# Patient Record
Sex: MALE | Race: WHITE | NOT HISPANIC OR LATINO | Employment: OTHER | URBAN - NONMETROPOLITAN AREA
[De-identification: names, ages, dates, MRNs, and addresses within clinical notes are randomized per-mention and may not be internally consistent; named-entity substitution may affect disease eponyms.]

---

## 2017-03-30 ENCOUNTER — TRANSCRIBE ORDERS (OUTPATIENT)
Dept: PHYSICAL THERAPY | Facility: HOSPITAL | Age: 47
End: 2017-03-30

## 2017-03-30 DIAGNOSIS — M51.36 DEGENERATION OF LUMBAR INTERVERTEBRAL DISC: ICD-10-CM

## 2017-03-30 DIAGNOSIS — M54.10 RADICULAR SYNDROME OF LOWER LIMBS: Primary | ICD-10-CM

## 2017-04-20 ENCOUNTER — APPOINTMENT (OUTPATIENT)
Dept: PHYSICAL THERAPY | Facility: HOSPITAL | Age: 47
End: 2017-04-20

## 2017-05-25 ENCOUNTER — APPOINTMENT (OUTPATIENT)
Dept: LAB | Facility: HOSPITAL | Age: 47
End: 2017-05-25

## 2017-05-25 ENCOUNTER — OFFICE VISIT (OUTPATIENT)
Dept: CARDIAC SURGERY | Facility: CLINIC | Age: 47
End: 2017-05-25

## 2017-05-25 VITALS
SYSTOLIC BLOOD PRESSURE: 160 MMHG | TEMPERATURE: 97.3 F | BODY MASS INDEX: 32.7 KG/M2 | DIASTOLIC BLOOD PRESSURE: 70 MMHG | HEIGHT: 75 IN | OXYGEN SATURATION: 99 % | WEIGHT: 263 LBS | HEART RATE: 88 BPM

## 2017-05-25 DIAGNOSIS — R06.02 SHORTNESS OF BREATH: ICD-10-CM

## 2017-05-25 DIAGNOSIS — R93.89 ABNORMAL CHEST X-RAY: ICD-10-CM

## 2017-05-25 DIAGNOSIS — J18.9 PNEUMONIA DUE TO INFECTIOUS ORGANISM, UNSPECIFIED LATERALITY, UNSPECIFIED PART OF LUNG: Primary | ICD-10-CM

## 2017-05-25 DIAGNOSIS — R60.0 LOCALIZED EDEMA: Primary | ICD-10-CM

## 2017-05-25 LAB
ALBUMIN SERPL-MCNC: 4.3 G/DL (ref 3.4–4.8)
ALBUMIN/GLOB SERPL: 1.2 G/DL (ref 1.1–1.8)
ALP SERPL-CCNC: 85 U/L (ref 38–126)
ALT SERPL W P-5'-P-CCNC: 53 U/L (ref 21–72)
ANION GAP SERPL CALCULATED.3IONS-SCNC: 11 MMOL/L (ref 5–15)
AST SERPL-CCNC: 30 U/L (ref 17–59)
BASOPHILS # BLD AUTO: 0.02 10*3/MM3 (ref 0–0.2)
BASOPHILS NFR BLD AUTO: 0.4 % (ref 0–2)
BILIRUB SERPL-MCNC: 0.4 MG/DL (ref 0.2–1.3)
BUN BLD-MCNC: 15 MG/DL (ref 7–21)
BUN/CREAT SERPL: 18.3 (ref 7–25)
CALCIUM SPEC-SCNC: 9.1 MG/DL (ref 8.4–10.2)
CHLORIDE SERPL-SCNC: 101 MMOL/L (ref 95–110)
CO2 SERPL-SCNC: 28 MMOL/L (ref 22–31)
CREAT BLD-MCNC: 0.82 MG/DL (ref 0.7–1.3)
CRP SERPL-MCNC: 0.6 MG/DL (ref 0–1)
DEPRECATED RDW RBC AUTO: 42.6 FL (ref 35.1–43.9)
EOSINOPHIL # BLD AUTO: 0.2 10*3/MM3 (ref 0–0.7)
EOSINOPHIL NFR BLD AUTO: 4.4 % (ref 0–7)
ERYTHROCYTE [DISTWIDTH] IN BLOOD BY AUTOMATED COUNT: 13.3 % (ref 11.5–14.5)
ERYTHROCYTE [SEDIMENTATION RATE] IN BLOOD: 3 MM/HR (ref 0–15)
GFR SERPL CREATININE-BSD FRML MDRD: 102 ML/MIN/1.73 (ref 63–147)
GLOBULIN UR ELPH-MCNC: 3.6 GM/DL (ref 2.3–3.5)
GLUCOSE BLD-MCNC: 92 MG/DL (ref 60–100)
HCT VFR BLD AUTO: 44.5 % (ref 39–49)
HGB BLD-MCNC: 15.1 G/DL (ref 13.7–17.3)
IMM GRANULOCYTES # BLD: 0.01 10*3/MM3 (ref 0–0.02)
IMM GRANULOCYTES NFR BLD: 0.2 % (ref 0–0.5)
LYMPHOCYTES # BLD AUTO: 1.25 10*3/MM3 (ref 0.6–4.2)
LYMPHOCYTES NFR BLD AUTO: 27.5 % (ref 10–50)
MCH RBC QN AUTO: 29.5 PG (ref 26.5–34)
MCHC RBC AUTO-ENTMCNC: 33.9 G/DL (ref 31.5–36.3)
MCV RBC AUTO: 86.9 FL (ref 80–98)
MONOCYTES # BLD AUTO: 0.49 10*3/MM3 (ref 0–0.9)
MONOCYTES NFR BLD AUTO: 10.8 % (ref 0–12)
NEUTROPHILS # BLD AUTO: 2.58 10*3/MM3 (ref 2–8.6)
NEUTROPHILS NFR BLD AUTO: 56.7 % (ref 37–80)
PLATELET # BLD AUTO: 191 10*3/MM3 (ref 150–450)
PMV BLD AUTO: 11 FL (ref 8–12)
POTASSIUM BLD-SCNC: 4 MMOL/L (ref 3.5–5.1)
PROT SERPL-MCNC: 7.9 G/DL (ref 6.3–8.6)
RBC # BLD AUTO: 5.12 10*6/MM3 (ref 4.37–5.74)
SODIUM BLD-SCNC: 140 MMOL/L (ref 137–145)
WBC NRBC COR # BLD: 4.55 10*3/MM3 (ref 3.2–9.8)

## 2017-05-25 PROCEDURE — 85613 RUSSELL VIPER VENOM DILUTED: CPT | Performed by: THORACIC SURGERY (CARDIOTHORACIC VASCULAR SURGERY)

## 2017-05-25 PROCEDURE — 85732 THROMBOPLASTIN TIME PARTIAL: CPT | Performed by: THORACIC SURGERY (CARDIOTHORACIC VASCULAR SURGERY)

## 2017-05-25 PROCEDURE — 85025 COMPLETE CBC W/AUTO DIFF WBC: CPT | Performed by: THORACIC SURGERY (CARDIOTHORACIC VASCULAR SURGERY)

## 2017-05-25 PROCEDURE — 85670 THROMBIN TIME PLASMA: CPT | Performed by: THORACIC SURGERY (CARDIOTHORACIC VASCULAR SURGERY)

## 2017-05-25 PROCEDURE — 86140 C-REACTIVE PROTEIN: CPT | Performed by: THORACIC SURGERY (CARDIOTHORACIC VASCULAR SURGERY)

## 2017-05-25 PROCEDURE — 85651 RBC SED RATE NONAUTOMATED: CPT | Performed by: THORACIC SURGERY (CARDIOTHORACIC VASCULAR SURGERY)

## 2017-05-25 PROCEDURE — 80053 COMPREHEN METABOLIC PANEL: CPT | Performed by: THORACIC SURGERY (CARDIOTHORACIC VASCULAR SURGERY)

## 2017-05-25 PROCEDURE — 99202 OFFICE O/P NEW SF 15 MIN: CPT | Performed by: THORACIC SURGERY (CARDIOTHORACIC VASCULAR SURGERY)

## 2017-05-25 PROCEDURE — 36415 COLL VENOUS BLD VENIPUNCTURE: CPT | Performed by: THORACIC SURGERY (CARDIOTHORACIC VASCULAR SURGERY)

## 2017-05-25 PROCEDURE — 85705 THROMBOPLASTIN INHIBITION: CPT | Performed by: THORACIC SURGERY (CARDIOTHORACIC VASCULAR SURGERY)

## 2017-05-25 RX ORDER — BUMETANIDE 1 MG/1
TABLET ORAL
COMMUNITY
Start: 2017-05-09 | End: 2017-09-13 | Stop reason: DRUGHIGH

## 2017-05-25 RX ORDER — ASPIRIN 81 MG
TABLET, DELAYED RELEASE (ENTERIC COATED) ORAL
COMMUNITY
Start: 2017-05-16 | End: 2021-05-01

## 2017-05-25 RX ORDER — RANITIDINE 150 MG/1
TABLET ORAL
COMMUNITY
Start: 2017-04-17 | End: 2017-09-13 | Stop reason: DRUGHIGH

## 2017-05-25 RX ORDER — ATORVASTATIN CALCIUM 10 MG/1
20 TABLET, FILM COATED ORAL NIGHTLY
COMMUNITY
Start: 2017-05-16 | End: 2021-05-01

## 2017-05-25 RX ORDER — OMEPRAZOLE 40 MG/1
CAPSULE, DELAYED RELEASE ORAL
COMMUNITY
Start: 2017-02-28 | End: 2017-09-13 | Stop reason: DRUGHIGH

## 2017-05-25 RX ORDER — ALBUTEROL SULFATE 90 UG/1
AEROSOL, METERED RESPIRATORY (INHALATION)
COMMUNITY
Start: 2017-03-07 | End: 2021-05-01

## 2017-05-25 RX ORDER — LEVOTHYROXINE SODIUM 0.1 MG/1
TABLET ORAL
COMMUNITY
Start: 2017-05-04 | End: 2021-05-01

## 2017-05-27 LAB
LA NT DPL PPP: 42.6 SEC (ref 0–55)
LA NT DPL/LA NT HPL PPP-RTO: 1.13 RATIO (ref 0–1.4)
LA NT PLATELET PPP: 34.5 SEC (ref 0–43.6)
LUPUS ANTICOAGULANT REFLEX: NORMAL
SCREEN DRVVT: 42.3 SEC (ref 0–47)
THROMBIN TIME: 16.8 SEC (ref 0–20.9)

## 2017-05-28 LAB
BH CV LOWER VASCULAR LEFT COMMON FEMORAL AUGMENT: NORMAL
BH CV LOWER VASCULAR LEFT COMMON FEMORAL COMPETENT: NORMAL
BH CV LOWER VASCULAR LEFT COMMON FEMORAL COMPRESS: NORMAL
BH CV LOWER VASCULAR LEFT COMMON FEMORAL PHASIC: NORMAL
BH CV LOWER VASCULAR LEFT COMMON FEMORAL SPONT: NORMAL
BH CV LOWER VASCULAR LEFT DISTAL FEMORAL AUGMENT: NORMAL
BH CV LOWER VASCULAR LEFT DISTAL FEMORAL COMPETENT: NORMAL
BH CV LOWER VASCULAR LEFT DISTAL FEMORAL COMPRESS: NORMAL
BH CV LOWER VASCULAR LEFT DISTAL FEMORAL PHASIC: NORMAL
BH CV LOWER VASCULAR LEFT DISTAL FEMORAL SPONT: NORMAL
BH CV LOWER VASCULAR LEFT GREATER SAPH AK AUGMENT: NORMAL
BH CV LOWER VASCULAR LEFT GREATER SAPH AK COMPETENT: NORMAL
BH CV LOWER VASCULAR LEFT GREATER SAPH AK COMPRESS: NORMAL
BH CV LOWER VASCULAR LEFT GREATER SAPH AK PHASIC: NORMAL
BH CV LOWER VASCULAR LEFT GREATER SAPH AK SPONT: NORMAL
BH CV LOWER VASCULAR LEFT LESSER SAPH AUGMENT: NORMAL
BH CV LOWER VASCULAR LEFT LESSER SAPH COMPETENT: NORMAL
BH CV LOWER VASCULAR LEFT LESSER SAPH COMPRESS: NORMAL
BH CV LOWER VASCULAR LEFT MID FEMORAL AUGMENT: NORMAL
BH CV LOWER VASCULAR LEFT MID FEMORAL COMPETENT: NORMAL
BH CV LOWER VASCULAR LEFT MID FEMORAL COMPRESS: NORMAL
BH CV LOWER VASCULAR LEFT MID FEMORAL PHASIC: NORMAL
BH CV LOWER VASCULAR LEFT MID FEMORAL SPONT: NORMAL
BH CV LOWER VASCULAR LEFT PERONEAL AUGMENT: NORMAL
BH CV LOWER VASCULAR LEFT PERONEAL COMPETENT: NORMAL
BH CV LOWER VASCULAR LEFT PERONEAL COMPRESS: NORMAL
BH CV LOWER VASCULAR LEFT PERONEAL PHASIC: NORMAL
BH CV LOWER VASCULAR LEFT PERONEAL SPONT: NORMAL
BH CV LOWER VASCULAR LEFT POPLITEAL AUGMENT: NORMAL
BH CV LOWER VASCULAR LEFT POPLITEAL COMPETENT: NORMAL
BH CV LOWER VASCULAR LEFT POPLITEAL COMPRESS: NORMAL
BH CV LOWER VASCULAR LEFT POPLITEAL PHASIC: NORMAL
BH CV LOWER VASCULAR LEFT POPLITEAL SPONT: NORMAL
BH CV LOWER VASCULAR LEFT POSTERIOR TIBIAL AUGMENT: NORMAL
BH CV LOWER VASCULAR LEFT POSTERIOR TIBIAL COMPETENT: NORMAL
BH CV LOWER VASCULAR LEFT POSTERIOR TIBIAL COMPRESS: NORMAL
BH CV LOWER VASCULAR LEFT POSTERIOR TIBIAL PHASIC: NORMAL
BH CV LOWER VASCULAR LEFT POSTERIOR TIBIAL SPONT: NORMAL
BH CV LOWER VASCULAR LEFT PROFUNDA FEMORAL AUGMENT: NORMAL
BH CV LOWER VASCULAR LEFT PROFUNDA FEMORAL COMPETENT: NORMAL
BH CV LOWER VASCULAR LEFT PROFUNDA FEMORAL COMPRESS: NORMAL
BH CV LOWER VASCULAR LEFT PROFUNDA FEMORAL PHASIC: NORMAL
BH CV LOWER VASCULAR LEFT PROFUNDA FEMORAL SPONT: NORMAL
BH CV LOWER VASCULAR LEFT PROXIMAL FEMORAL AUGMENT: NORMAL
BH CV LOWER VASCULAR LEFT PROXIMAL FEMORAL COMPETENT: NORMAL
BH CV LOWER VASCULAR LEFT PROXIMAL FEMORAL COMPRESS: NORMAL
BH CV LOWER VASCULAR LEFT PROXIMAL FEMORAL PHASIC: NORMAL
BH CV LOWER VASCULAR LEFT PROXIMAL FEMORAL SPONT: NORMAL
BH CV LOWER VASCULAR LEFT SAPHENOFEMORAL JUNCTION AUGMENT: NORMAL
BH CV LOWER VASCULAR LEFT SAPHENOFEMORAL JUNCTION COMPETENT: NORMAL
BH CV LOWER VASCULAR LEFT SAPHENOFEMORAL JUNCTION COMPRESS: NORMAL
BH CV LOWER VASCULAR LEFT SAPHENOFEMORAL JUNCTION PHASIC: NORMAL
BH CV LOWER VASCULAR LEFT SAPHENOFEMORAL JUNCTION SPONT: NORMAL
BH CV LOWER VASCULAR RIGHT COMMON FEMORAL AUGMENT: NORMAL
BH CV LOWER VASCULAR RIGHT COMMON FEMORAL COMPETENT: NORMAL
BH CV LOWER VASCULAR RIGHT COMMON FEMORAL COMPRESS: NORMAL
BH CV LOWER VASCULAR RIGHT COMMON FEMORAL PHASIC: NORMAL
BH CV LOWER VASCULAR RIGHT COMMON FEMORAL SPONT: NORMAL
BH CV LOWER VASCULAR RIGHT DISTAL FEMORAL AUGMENT: NORMAL
BH CV LOWER VASCULAR RIGHT DISTAL FEMORAL COMPETENT: NORMAL
BH CV LOWER VASCULAR RIGHT DISTAL FEMORAL COMPRESS: NORMAL
BH CV LOWER VASCULAR RIGHT DISTAL FEMORAL PHASIC: NORMAL
BH CV LOWER VASCULAR RIGHT DISTAL FEMORAL SPONT: NORMAL
BH CV LOWER VASCULAR RIGHT GASTRONEMIUS COMPETENT: NORMAL
BH CV LOWER VASCULAR RIGHT GASTRONEMIUS COMPRESS: NORMAL
BH CV LOWER VASCULAR RIGHT GREATER SAPH AK AUGMENT: NORMAL
BH CV LOWER VASCULAR RIGHT GREATER SAPH AK COMPETENT: NORMAL
BH CV LOWER VASCULAR RIGHT GREATER SAPH AK COMPRESS: NORMAL
BH CV LOWER VASCULAR RIGHT GREATER SAPH AK PHASIC: NORMAL
BH CV LOWER VASCULAR RIGHT GREATER SAPH AK SPONT: NORMAL
BH CV LOWER VASCULAR RIGHT GREATER SAPH BK AUGMENT: NORMAL
BH CV LOWER VASCULAR RIGHT GREATER SAPH BK COMPETENT: NORMAL
BH CV LOWER VASCULAR RIGHT GREATER SAPH BK COMPRESS: NORMAL
BH CV LOWER VASCULAR RIGHT GREATER SAPH BK PHASIC: NORMAL
BH CV LOWER VASCULAR RIGHT GREATER SAPH BK SPONT: NORMAL
BH CV LOWER VASCULAR RIGHT LESSER SAPH AUGMENT: NORMAL
BH CV LOWER VASCULAR RIGHT LESSER SAPH COMPETENT: NORMAL
BH CV LOWER VASCULAR RIGHT LESSER SAPH COMPRESS: NORMAL
BH CV LOWER VASCULAR RIGHT MID FEMORAL AUGMENT: NORMAL
BH CV LOWER VASCULAR RIGHT MID FEMORAL COMPETENT: NORMAL
BH CV LOWER VASCULAR RIGHT MID FEMORAL COMPRESS: NORMAL
BH CV LOWER VASCULAR RIGHT MID FEMORAL PHASIC: NORMAL
BH CV LOWER VASCULAR RIGHT MID FEMORAL SPONT: NORMAL
BH CV LOWER VASCULAR RIGHT PERONEAL AUGMENT: NORMAL
BH CV LOWER VASCULAR RIGHT PERONEAL COMPETENT: NORMAL
BH CV LOWER VASCULAR RIGHT PERONEAL COMPRESS: NORMAL
BH CV LOWER VASCULAR RIGHT PERONEAL PHASIC: NORMAL
BH CV LOWER VASCULAR RIGHT PERONEAL SPONT: NORMAL
BH CV LOWER VASCULAR RIGHT POPLITEAL AUGMENT: NORMAL
BH CV LOWER VASCULAR RIGHT POPLITEAL COMPETENT: NORMAL
BH CV LOWER VASCULAR RIGHT POPLITEAL COMPRESS: NORMAL
BH CV LOWER VASCULAR RIGHT POPLITEAL PHASIC: NORMAL
BH CV LOWER VASCULAR RIGHT POPLITEAL SPONT: NORMAL
BH CV LOWER VASCULAR RIGHT POSTERIOR TIBIAL AUGMENT: NORMAL
BH CV LOWER VASCULAR RIGHT POSTERIOR TIBIAL COMPETENT: NORMAL
BH CV LOWER VASCULAR RIGHT POSTERIOR TIBIAL COMPRESS: NORMAL
BH CV LOWER VASCULAR RIGHT POSTERIOR TIBIAL PHASIC: NORMAL
BH CV LOWER VASCULAR RIGHT POSTERIOR TIBIAL SPONT: NORMAL
BH CV LOWER VASCULAR RIGHT PROFUNDA FEMORAL AUGMENT: NORMAL
BH CV LOWER VASCULAR RIGHT PROFUNDA FEMORAL COMPETENT: NORMAL
BH CV LOWER VASCULAR RIGHT PROFUNDA FEMORAL COMPRESS: NORMAL
BH CV LOWER VASCULAR RIGHT PROFUNDA FEMORAL PHASIC: NORMAL
BH CV LOWER VASCULAR RIGHT PROFUNDA FEMORAL SPONT: NORMAL
BH CV LOWER VASCULAR RIGHT PROXIMAL FEMORAL AUGMENT: NORMAL
BH CV LOWER VASCULAR RIGHT PROXIMAL FEMORAL COMPETENT: NORMAL
BH CV LOWER VASCULAR RIGHT PROXIMAL FEMORAL COMPRESS: NORMAL
BH CV LOWER VASCULAR RIGHT PROXIMAL FEMORAL PHASIC: NORMAL
BH CV LOWER VASCULAR RIGHT PROXIMAL FEMORAL SPONT: NORMAL
BH CV LOWER VASCULAR RIGHT SAPHENOFEMORAL JUNCTION AUGMENT: NORMAL
BH CV LOWER VASCULAR RIGHT SAPHENOFEMORAL JUNCTION COMPETENT: NORMAL
BH CV LOWER VASCULAR RIGHT SAPHENOFEMORAL JUNCTION COMPRESS: NORMAL
BH CV LOWER VASCULAR RIGHT SAPHENOFEMORAL JUNCTION PHASIC: NORMAL
BH CV LOWER VASCULAR RIGHT SAPHENOFEMORAL JUNCTION SPONT: NORMAL

## 2017-06-05 DIAGNOSIS — M79.89 LEG SWELLING: Primary | ICD-10-CM

## 2017-06-06 ENCOUNTER — HOSPITAL ENCOUNTER (OUTPATIENT)
Dept: CT IMAGING | Facility: HOSPITAL | Age: 47
Discharge: HOME | End: 2017-06-06
Admitting: THORACIC SURGERY (CARDIOTHORACIC VASCULAR SURGERY)

## 2017-06-06 ENCOUNTER — APPOINTMENT (OUTPATIENT)
Dept: LAB | Facility: HOSPITAL | Age: 47
End: 2017-06-06

## 2017-06-06 ENCOUNTER — OFFICE VISIT (OUTPATIENT)
Dept: CARDIAC SURGERY | Facility: CLINIC | Age: 47
End: 2017-06-06

## 2017-06-06 VITALS
TEMPERATURE: 98.3 F | DIASTOLIC BLOOD PRESSURE: 75 MMHG | SYSTOLIC BLOOD PRESSURE: 130 MMHG | HEART RATE: 61 BPM | BODY MASS INDEX: 32.7 KG/M2 | HEIGHT: 75 IN | WEIGHT: 263 LBS | OXYGEN SATURATION: 98 %

## 2017-06-06 DIAGNOSIS — J18.9 PNEUMONIA DUE TO INFECTIOUS ORGANISM, UNSPECIFIED LATERALITY, UNSPECIFIED PART OF LUNG: ICD-10-CM

## 2017-06-06 DIAGNOSIS — R06.02 SHORTNESS OF BREATH: ICD-10-CM

## 2017-06-06 DIAGNOSIS — R91.8 HILAR MASS: Primary | ICD-10-CM

## 2017-06-06 DIAGNOSIS — R60.0 LOCALIZED EDEMA: ICD-10-CM

## 2017-06-06 DIAGNOSIS — R93.89 ABNORMAL CHEST X-RAY: ICD-10-CM

## 2017-06-06 LAB
CRP SERPL-MCNC: 1.1 MG/DL (ref 0–1)
ERYTHROCYTE [SEDIMENTATION RATE] IN BLOOD: 12 MM/HR (ref 0–15)

## 2017-06-06 PROCEDURE — 99212 OFFICE O/P EST SF 10 MIN: CPT | Performed by: THORACIC SURGERY (CARDIOTHORACIC VASCULAR SURGERY)

## 2017-06-06 PROCEDURE — 71260 CT THORAX DX C+: CPT

## 2017-06-06 PROCEDURE — 36415 COLL VENOUS BLD VENIPUNCTURE: CPT | Performed by: THORACIC SURGERY (CARDIOTHORACIC VASCULAR SURGERY)

## 2017-06-06 PROCEDURE — 0 IOPAMIDOL 61 % SOLUTION: Performed by: THORACIC SURGERY (CARDIOTHORACIC VASCULAR SURGERY)

## 2017-06-06 PROCEDURE — 85651 RBC SED RATE NONAUTOMATED: CPT | Performed by: THORACIC SURGERY (CARDIOTHORACIC VASCULAR SURGERY)

## 2017-06-06 PROCEDURE — 86140 C-REACTIVE PROTEIN: CPT | Performed by: THORACIC SURGERY (CARDIOTHORACIC VASCULAR SURGERY)

## 2017-06-06 RX ADMIN — IOPAMIDOL 94 ML: 612 INJECTION, SOLUTION INTRAVENOUS at 10:00

## 2017-06-07 NOTE — PROGRESS NOTES
"Jalil Camarillo  1970            46 y.o.      6/6/2017    Chief Complaint   Patient presents with   • 10 day f/u     labs OA/ CT chest     46-year-old man seen initially on 5/25/2017 with edema both lower extremities, worse left than right.  Please see my extensive dictation at that time 5/25/17 which includes details of the extensive evaluation he underwent an [facility].  His CRP has returned to nearly normal.  Edema persists both lower extremities, worse left than right.  Lupus panel negative      HPI        ROS       Current Outpatient Prescriptions:   •  ASPIRIN LOW DOSE 81 MG EC tablet, , Disp: , Rfl:   •  atorvastatin (LIPITOR) 10 MG tablet, , Disp: , Rfl:   •  bumetanide (BUMEX) 1 MG tablet, , Disp: , Rfl:   •  levothyroxine (SYNTHROID, LEVOTHROID) 100 MCG tablet, , Disp: , Rfl:   •  omeprazole (priLOSEC) 40 MG capsule, , Disp: , Rfl:   •  raNITIdine (ZANTAC) 150 MG tablet, , Disp: , Rfl:   •  VENTOLIN  (90 BASE) MCG/ACT inhaler, , Disp: , Rfl:   No current facility-administered medications for this visit.     The following portions of the patient's history were reviewed and updated as appropriate: allergies, current medications, past family history, past medical history, past social history, past surgical history and problem list.        Past Surgical History:   Procedure Laterality Date   • CARDIAC CATHETERIZATION  02/02/2017    [place] No  interventions   • HERNIA REPAIR Right 04/20/2017    [place],, Dr. Lemus, [facility]           Physical Exam  /75 (BP Location: Left arm, Patient Position: Sitting)  Pulse 61  Temp 98.3 °F (36.8 °C) (Oral)   Ht 75\" (190.5 cm)  Wt 263 lb (119 kg)  SpO2 98%  BMI 32.87 kg/m2    HEENT:    CHEST:    HEART:    ABDOMEN:    EXTREMITIES:Edema persists both lower extremities and with prolonged standing patient experiences discomfort in both ankles    VASCULAR LAB " STUDIES:            RADIOLOGY: CT scan I.V. Contrast (6-6-17)  1. Linear changes left lung base                                                                              2. Enlarged left hilar lymph node 2.94 cm peribronchial                                                                                         Left stem bronchus                                                                              3. Radiology suggested PET scan  Consideratons included pulmonary neoplasia, such as left lung carcinoma, or lymphoma      Hilar mass [R91.8]    IMPRESSION:  1. Abnormality CT scan chest 2.94 cm lymph node adjacent to left mainstem bronchus left hilar lymph node or mass                  Assessment/Plan  Jalil was seen today for 10 day f/u.    Diagnoses and all orders for this visit:    Hilar mass  -     Cancel: NM Pet Chest  -     NM Pet Skull Base To Mid Thigh                Plan   1. PET scan planned but temporarily delayed in c=scheduling because Biopsy required.  2. Upon return x-rays both ankles and bilateral tib/fib, uric acid  3. Local cardiology consult, Dr. Mohamud   4. Potential pulmonology consult for consideration bronchoscopy and trans bronchial biopsy of left hilar mass              Jack L. Hamman, MD  6/6/2017

## 2017-08-14 ENCOUNTER — TRANSCRIBE ORDERS (OUTPATIENT)
Dept: PHYSICAL THERAPY | Facility: HOSPITAL | Age: 47
End: 2017-08-14

## 2017-08-14 DIAGNOSIS — M51.36 DDD (DEGENERATIVE DISC DISEASE), LUMBAR: Primary | ICD-10-CM

## 2017-09-13 ENCOUNTER — HOSPITAL ENCOUNTER (OUTPATIENT)
Dept: GENERAL RADIOLOGY | Facility: HOSPITAL | Age: 47
Discharge: HOME | End: 2017-09-13
Attending: INTERNAL MEDICINE | Admitting: INTERNAL MEDICINE

## 2017-09-13 ENCOUNTER — APPOINTMENT (OUTPATIENT)
Dept: LAB | Facility: HOSPITAL | Age: 47
End: 2017-09-13

## 2017-09-13 ENCOUNTER — OFFICE VISIT (OUTPATIENT)
Dept: CARDIOLOGY | Facility: CLINIC | Age: 47
End: 2017-09-13

## 2017-09-13 VITALS
HEART RATE: 81 BPM | HEIGHT: 75 IN | WEIGHT: 281 LBS | OXYGEN SATURATION: 96 % | BODY MASS INDEX: 34.94 KG/M2 | DIASTOLIC BLOOD PRESSURE: 80 MMHG | SYSTOLIC BLOOD PRESSURE: 126 MMHG

## 2017-09-13 DIAGNOSIS — R06.09 DYSPNEA ON EXERTION: ICD-10-CM

## 2017-09-13 DIAGNOSIS — R07.2 PRECORDIAL PAIN: Primary | ICD-10-CM

## 2017-09-13 DIAGNOSIS — E78.2 MIXED HYPERLIPIDEMIA: ICD-10-CM

## 2017-09-13 DIAGNOSIS — R60.0 LOCALIZED EDEMA: ICD-10-CM

## 2017-09-13 DIAGNOSIS — R07.9 CHEST PAIN, UNSPECIFIED TYPE: Primary | ICD-10-CM

## 2017-09-13 DIAGNOSIS — R93.89 ABNORMAL CHEST X-RAY: ICD-10-CM

## 2017-09-13 LAB
ALBUMIN SERPL-MCNC: 4.3 G/DL (ref 3.4–4.8)
ALBUMIN UR-MCNC: <0.6 MG/L
ALBUMIN/GLOB SERPL: 1.3 G/DL (ref 1.1–1.8)
ALP SERPL-CCNC: 96 U/L (ref 38–126)
ALT SERPL W P-5'-P-CCNC: 79 U/L (ref 21–72)
ANION GAP SERPL CALCULATED.3IONS-SCNC: 9 MMOL/L (ref 5–15)
ARTICHOKE IGE QN: 75 MG/DL (ref 1–129)
AST SERPL-CCNC: 39 U/L (ref 17–59)
BILIRUB SERPL-MCNC: 0.6 MG/DL (ref 0.2–1.3)
BUN BLD-MCNC: 15 MG/DL (ref 7–21)
BUN/CREAT SERPL: 15 (ref 7–25)
CALCIUM SPEC-SCNC: 9.6 MG/DL (ref 8.4–10.2)
CHLORIDE SERPL-SCNC: 100 MMOL/L (ref 95–110)
CHOLEST SERPL-MCNC: 145 MG/DL (ref 0–199)
CO2 SERPL-SCNC: 30 MMOL/L (ref 22–31)
CREAT BLD-MCNC: 1 MG/DL (ref 0.7–1.3)
DEPRECATED RDW RBC AUTO: 43.2 FL (ref 35.1–43.9)
ERYTHROCYTE [DISTWIDTH] IN BLOOD BY AUTOMATED COUNT: 13.9 % (ref 11.5–14.5)
GFR SERPL CREATININE-BSD FRML MDRD: 80 ML/MIN/1.73 (ref 63–147)
GLOBULIN UR ELPH-MCNC: 3.3 GM/DL (ref 2.3–3.5)
GLUCOSE BLD-MCNC: 84 MG/DL (ref 60–100)
HCT VFR BLD AUTO: 43.6 % (ref 39–49)
HDLC SERPL-MCNC: 35 MG/DL (ref 60–200)
HGB BLD-MCNC: 14.5 G/DL (ref 13.7–17.3)
LDLC/HDLC SERPL: 1.79 {RATIO} (ref 0–3.55)
MCH RBC QN AUTO: 28.7 PG (ref 26.5–34)
MCHC RBC AUTO-ENTMCNC: 33.3 G/DL (ref 31.5–36.3)
MCV RBC AUTO: 86.3 FL (ref 80–98)
NT-PROBNP SERPL-MCNC: <11.1 PG/ML (ref 0–450)
PLATELET # BLD AUTO: 190 10*3/MM3 (ref 150–450)
PMV BLD AUTO: 10.5 FL (ref 8–12)
POTASSIUM BLD-SCNC: 4.1 MMOL/L (ref 3.5–5.1)
PROT SERPL-MCNC: 7.6 G/DL (ref 6.3–8.6)
RBC # BLD AUTO: 5.05 10*6/MM3 (ref 4.37–5.74)
SODIUM BLD-SCNC: 139 MMOL/L (ref 137–145)
TRIGL SERPL-MCNC: 237 MG/DL (ref 20–199)
TSH SERPL DL<=0.05 MIU/L-ACNC: 1.38 MIU/ML (ref 0.46–4.68)
WBC NRBC COR # BLD: 5.95 10*3/MM3 (ref 3.2–9.8)

## 2017-09-13 PROCEDURE — 82043 UR ALBUMIN QUANTITATIVE: CPT | Performed by: INTERNAL MEDICINE

## 2017-09-13 PROCEDURE — 85027 COMPLETE CBC AUTOMATED: CPT | Performed by: INTERNAL MEDICINE

## 2017-09-13 PROCEDURE — 71020 HC CHEST PA AND LATERAL: CPT

## 2017-09-13 PROCEDURE — 93000 ELECTROCARDIOGRAM COMPLETE: CPT | Performed by: INTERNAL MEDICINE

## 2017-09-13 PROCEDURE — 36415 COLL VENOUS BLD VENIPUNCTURE: CPT | Performed by: INTERNAL MEDICINE

## 2017-09-13 PROCEDURE — 83880 ASSAY OF NATRIURETIC PEPTIDE: CPT | Performed by: INTERNAL MEDICINE

## 2017-09-13 PROCEDURE — 84443 ASSAY THYROID STIM HORMONE: CPT | Performed by: INTERNAL MEDICINE

## 2017-09-13 PROCEDURE — 80053 COMPREHEN METABOLIC PANEL: CPT | Performed by: INTERNAL MEDICINE

## 2017-09-13 PROCEDURE — 99203 OFFICE O/P NEW LOW 30 MIN: CPT | Performed by: INTERNAL MEDICINE

## 2017-09-13 PROCEDURE — 80061 LIPID PANEL: CPT | Performed by: INTERNAL MEDICINE

## 2017-09-13 RX ORDER — PANTOPRAZOLE SODIUM 40 MG/1
TABLET, DELAYED RELEASE ORAL
COMMUNITY
Start: 2017-08-07 | End: 2021-05-01

## 2017-09-13 RX ORDER — IPRATROPIUM BROMIDE AND ALBUTEROL SULFATE 2.5; .5 MG/3ML; MG/3ML
3 SOLUTION RESPIRATORY (INHALATION) EVERY 4 HOURS PRN
COMMUNITY
End: 2021-05-01

## 2017-09-13 RX ORDER — BUMETANIDE 2 MG/1
TABLET ORAL DAILY
COMMUNITY
Start: 2017-08-16 | End: 2021-05-01

## 2017-09-13 NOTE — PROGRESS NOTES
"   Cardiovascular Medicine      Dick Mohamud M.D., Ph.D., [facility]         Thank you for asking me to see Jalil Camarillo for CP.    History of Present Illness  This is a 47 y.o. male with:  1. CPS  2. Dyspnea  3. BEATRIZ  A. Awaiting CPAP  4. HLD  5. Left hilar mass    CPS and Dyspnea  The patient's referred for complaints of chest pain and dyspnea.  Patient does have a history of hyperlipidemia.  He is a former smoker. He tells me he had a stress test earlier this year. His CP is a fluttering sensation. He also gets a burning sensation over his chest. He has been having dyspnea, LE edema and abdominal swelling. He complains of abdominal pain. He was found to have an abnormal chest CT. He has not had PFTs or seen pulmonary. He is now using Bumex 2 mg a day. He has no known issues with CHF or prior MI. He tells me he had a LHC earlier this year.  He does not know the results of these.  I do not actually have his records.  He is actually tells me that he seen numerous physicians since earlier this year.  He tells me that everyone he sees says that he is \"fine.  \"He has not seen pulmonary.  Of note, he did have a chest CT scan that showed a left hilar mass and mediastinal lymphadenopathy.  He was not referred to pulmonary.  He denies any history of lung diseases.  He does have exertional intolerance and continued dyspnea.  His Bumex was increased to 20 mg a day.  He states that his abdomen is no longer swollen but he remains with stable lower extremity edema.    Review of Systems - History obtained from chart review and the patient  General ROS: negative  Respiratory ROS: positive for - shortness of breath  Cardiovascular ROS: positive for - chest pain.  All other systems were reviewed and were negative.    family history is not on file. He was adopted.     reports that he has quit smoking. His smoking use included Cigarettes. He has never used smokeless tobacco. He reports that he does not drink alcohol or use illicit " drugs.    No Known Allergies      Current Outpatient Prescriptions:   •  ASPIRIN LOW DOSE 81 MG EC tablet, , Disp: , Rfl:   •  atorvastatin (LIPITOR) 10 MG tablet, , Disp: , Rfl:   •  bumetanide (BUMEX) 1 MG tablet, , Disp: , Rfl:   •  levothyroxine (SYNTHROID, LEVOTHROID) 100 MCG tablet, , Disp: , Rfl:   •  omeprazole (priLOSEC) 40 MG capsule, , Disp: , Rfl:   •  raNITIdine (ZANTAC) 150 MG tablet, , Disp: , Rfl:   •  VENTOLIN  (90 BASE) MCG/ACT inhaler, , Disp: , Rfl:     Physical Exam:  Vitals:    09/13/17 1016   BP: 126/80   Pulse:    SpO2:      Body mass index is 35.12 kg/(m^2).    GEN: alert, appears stated age and cooperative  Body Habitus: obese  Neuro: CN II-XII grossly intact.   HEENT: Head: Normocephalic, no lesions, without obvious abnormality.  Neck / Thyroid: Supple, no masses, nodes, nodules or enlargement. No arcus senilis, xanthelasma or xanthomas. PERRL. Normal external ears. No drainage. No thyromegaly. Neck supple. No LAD. Trachea midline. Nose, normal.  JVP: 6 cm of water at 45 degrees HJR: absent      Carotid:  Upstroke: easily palpated bilaterally Volume: Normal.    Carotid Bruit:  None  Subclavian Bruit: Not present.    Lymph: No overt LAD.   Back: Normal.  Chest:  Normal Excursion: Good    I:E: Good  Pulmonary:clear to auscultation, no wheezes, rales or rhonchi, symmetric air entry. Equal chest excursion. Chest physical exam is normal. No tenderness.        Precordium:  No palpable heaves or thrusts. P2 is not palpable.   Casanova:  normal size and placement Palpable S4: Not present.   Heart rate: normal  Heart Rhythm: regular     Heart Sounds: S1: normal intensity  S2: normal intensity  S3: absent   S4: absent  Opening Snap: absent  A2-OS:  N/A  Pericardial rub: absent    Ejection click: None      Murmurs: Systolic: none  Diastolic: none  Abdomen: Soft, non-tender, normal bowel sounds; no bruits, organomegaly or masses.  Extremity: 1+ LE edema  Pulses: Right radial artery has 2+ (normal)  pulse and Left radial artery has 2+ (normal) pulse    DATA REVIEWED:     CHEST CT FINDINGS:  Some linear, discoid changes like a segment  left upper lobe at the left lung base either atelectasis or  discoid fibrosis. There are numerous slightly enlarged  mediastinal lymph nodes the largest 2 cm subcarinal region. There  is left hilar mass, lymph node 2.94 cm in diameter series 5 image  41.      No evidence of pleural fluid.      Tiny low-density lesion within the liver most likely cyst or  hemangioma. Liver is otherwise unremarkable. Normal adrenal  glands.     IMPRESSION:  CONCLUSION: Linear changes at left lung base, lingular segment  left upper lobe either atelectasis or fibrosis.     Enlarged left hilar lymph node or mass 2.94 cm. Numerous slightly  enlarged mediastinal lymph nodes.     Pulmonary neoplasia such as left lung carcinoma or lymphoma is a  diagnostic consideration.    EKG performed and dated today shows normal sinus rhythm.  Assessment/Plan      1. Chest pain syndrome.  The patient's symptoms are atypical, but he does have risk factors for obstructive CAD.  He is moderate-risk.  I recommended an ischemia evaluation.  Unfortunately, because of exercise intolerance he is unable to exercise on a treadmill.   -Risks and benefits were discussed including symptoms to call 911 or go to the emergency department  -Lexiscan MPS and echocardiogram    2. Dyspnea.  The patient does have risk factors for the development of congestive heart failure.  With that said, filling pressures are normal on physical examination today.  His chest CT showed some likely discoid fibrosis with mediastinal lymphadenopathy and likely hilar mass..  He is not seeing pulmonary.  He is a former smoker.  I advised the cardiac and pulmonary evaluation.  It's possible that he ultimately may need RHC to exclude HFpEF, but will proceed with noninvasive workup, first.  He was also recently diagnosed with BEATRIZ and is awaiting CPAP  initiation.  -Pulmonary referral, Dr. Watkins  -Full PFTs, 6MWT, TTE and labs  -Urine microalbumin  -Agree with CPAP initiation  -Obtain full records from [place]    3. Cardiac Risk Assessment and need for statin therapy: Moderate Risk.   -Recommended moderate-intensity statin therapy  -Lipid-lowering medications: Lipitor (atorvastatin)  -Recommended ASA    4. Tobacco status: Former smoker.    5. AAA screening.  Indicated at the age of 66    Plan for follow-up: in 1 month           This document has been electronically signed by Dick Mohamud MD PhD on September 13, 2017 10:26 AM

## 2017-09-25 ENCOUNTER — TELEPHONE (OUTPATIENT)
Dept: GENERAL RADIOLOGY | Facility: HOSPITAL | Age: 47
End: 2017-09-25

## 2017-11-02 ENCOUNTER — TELEPHONE (OUTPATIENT)
Dept: GENERAL RADIOLOGY | Facility: HOSPITAL | Age: 47
End: 2017-11-02

## 2017-11-02 ENCOUNTER — APPOINTMENT (OUTPATIENT)
Dept: NUCLEAR MEDICINE | Facility: HOSPITAL | Age: 47
End: 2017-11-02
Attending: INTERNAL MEDICINE

## 2017-11-02 ENCOUNTER — APPOINTMENT (OUTPATIENT)
Dept: CARDIOLOGY | Facility: HOSPITAL | Age: 47
End: 2017-11-02
Attending: INTERNAL MEDICINE

## 2019-12-11 ENCOUNTER — APPOINTMENT (OUTPATIENT)
Dept: WOUND CARE | Facility: HOSPITAL | Age: 49
End: 2019-12-11

## 2019-12-12 ENCOUNTER — APPOINTMENT (OUTPATIENT)
Dept: WOUND CARE | Facility: HOSPITAL | Age: 49
End: 2019-12-12

## 2019-12-30 ENCOUNTER — OFFICE VISIT (OUTPATIENT)
Dept: WOUND CARE | Facility: HOSPITAL | Age: 49
End: 2019-12-30

## 2019-12-30 PROCEDURE — G0463 HOSPITAL OUTPT CLINIC VISIT: HCPCS

## 2020-01-03 ENCOUNTER — OFFICE VISIT (OUTPATIENT)
Dept: WOUND CARE | Facility: HOSPITAL | Age: 50
End: 2020-01-03

## 2020-01-06 ENCOUNTER — OFFICE VISIT (OUTPATIENT)
Dept: WOUND CARE | Facility: HOSPITAL | Age: 50
End: 2020-01-06

## 2020-01-10 ENCOUNTER — OFFICE VISIT (OUTPATIENT)
Dept: WOUND CARE | Facility: HOSPITAL | Age: 50
End: 2020-01-10

## 2020-01-13 ENCOUNTER — OFFICE VISIT (OUTPATIENT)
Dept: WOUND CARE | Facility: HOSPITAL | Age: 50
End: 2020-01-13

## 2020-01-14 ENCOUNTER — APPOINTMENT (OUTPATIENT)
Dept: WOUND CARE | Facility: HOSPITAL | Age: 50
End: 2020-01-14

## 2020-01-20 ENCOUNTER — OFFICE VISIT (OUTPATIENT)
Dept: WOUND CARE | Facility: HOSPITAL | Age: 50
End: 2020-01-20

## 2020-01-20 PROCEDURE — G0463 HOSPITAL OUTPT CLINIC VISIT: HCPCS

## 2021-04-22 ENCOUNTER — TRANSCRIBE ORDERS (OUTPATIENT)
Dept: WOUND CARE | Facility: HOSPITAL | Age: 51
End: 2021-04-22

## 2021-04-22 ENCOUNTER — OFFICE VISIT (OUTPATIENT)
Dept: WOUND CARE | Facility: HOSPITAL | Age: 51
End: 2021-04-22

## 2021-04-22 DIAGNOSIS — R60.0 EDEMA LEG: ICD-10-CM

## 2021-04-22 DIAGNOSIS — R09.89 WEAK PULSE: Primary | ICD-10-CM

## 2021-04-22 PROCEDURE — G0463 HOSPITAL OUTPT CLINIC VISIT: HCPCS

## 2021-05-10 ENCOUNTER — OFFICE VISIT (OUTPATIENT)
Dept: WOUND CARE | Facility: HOSPITAL | Age: 51
End: 2021-05-10

## 2021-05-13 ENCOUNTER — APPOINTMENT (OUTPATIENT)
Dept: WOUND CARE | Facility: HOSPITAL | Age: 51
End: 2021-05-13

## 2021-05-13 ENCOUNTER — OFFICE VISIT (OUTPATIENT)
Dept: WOUND CARE | Facility: HOSPITAL | Age: 51
End: 2021-05-13

## 2021-05-17 ENCOUNTER — OFFICE VISIT (OUTPATIENT)
Dept: WOUND CARE | Facility: HOSPITAL | Age: 51
End: 2021-05-17

## 2021-05-24 ENCOUNTER — OFFICE VISIT (OUTPATIENT)
Dept: WOUND CARE | Facility: HOSPITAL | Age: 51
End: 2021-05-24

## 2021-06-03 ENCOUNTER — OFFICE VISIT (OUTPATIENT)
Dept: WOUND CARE | Facility: HOSPITAL | Age: 51
End: 2021-06-03

## 2021-06-03 PROCEDURE — G0463 HOSPITAL OUTPT CLINIC VISIT: HCPCS

## 2021-07-14 ENCOUNTER — OFFICE VISIT (OUTPATIENT)
Dept: WOUND CARE | Facility: HOSPITAL | Age: 51
End: 2021-07-14

## 2021-07-15 ENCOUNTER — TRANSCRIBE ORDERS (OUTPATIENT)
Dept: HOME HEALTH SERVICES | Facility: HOME HEALTHCARE | Age: 51
End: 2021-07-15

## 2021-07-15 ENCOUNTER — HOME HEALTH ADMISSION (OUTPATIENT)
Dept: HOME HEALTH SERVICES | Facility: HOME HEALTHCARE | Age: 51
End: 2021-07-15

## 2021-07-15 DIAGNOSIS — L97.822 SKIN ULCER OF POPLITEAL REGION, LEFT, WITH FAT LAYER EXPOSED (HCC): Primary | ICD-10-CM

## 2021-07-16 ENCOUNTER — HOME CARE VISIT (OUTPATIENT)
Dept: HOME HEALTH SERVICES | Facility: CLINIC | Age: 51
End: 2021-07-16

## 2021-07-16 PROCEDURE — G0299 HHS/HOSPICE OF RN EA 15 MIN: HCPCS

## 2021-07-18 ENCOUNTER — HOME CARE VISIT (OUTPATIENT)
Dept: HOME HEALTH SERVICES | Facility: CLINIC | Age: 51
End: 2021-07-18

## 2021-07-18 VITALS
TEMPERATURE: 99.1 F | DIASTOLIC BLOOD PRESSURE: 88 MMHG | RESPIRATION RATE: 20 BRPM | SYSTOLIC BLOOD PRESSURE: 142 MMHG | HEART RATE: 72 BPM

## 2021-07-18 PROCEDURE — G0299 HHS/HOSPICE OF RN EA 15 MIN: HCPCS

## 2021-07-19 VITALS — TEMPERATURE: 97.7 F | DIASTOLIC BLOOD PRESSURE: 88 MMHG | HEART RATE: 80 BPM | SYSTOLIC BLOOD PRESSURE: 138 MMHG

## 2021-07-20 ENCOUNTER — HOME CARE VISIT (OUTPATIENT)
Dept: HOME HEALTH SERVICES | Facility: CLINIC | Age: 51
End: 2021-07-20

## 2021-07-20 VITALS
RESPIRATION RATE: 20 BRPM | SYSTOLIC BLOOD PRESSURE: 142 MMHG | HEART RATE: 90 BPM | DIASTOLIC BLOOD PRESSURE: 88 MMHG | TEMPERATURE: 96 F

## 2021-07-20 PROCEDURE — G0299 HHS/HOSPICE OF RN EA 15 MIN: HCPCS

## 2021-07-21 ENCOUNTER — OFFICE VISIT (OUTPATIENT)
Dept: WOUND CARE | Facility: HOSPITAL | Age: 51
End: 2021-07-21

## 2021-07-21 ENCOUNTER — LAB REQUISITION (OUTPATIENT)
Dept: LAB | Facility: HOSPITAL | Age: 51
End: 2021-07-21

## 2021-07-21 DIAGNOSIS — L97.822 NON-PRESSURE CHRONIC ULCER OF OTHER PART OF LEFT LOWER LEG WITH FAT LAYER EXPOSED (HCC): ICD-10-CM

## 2021-07-21 LAB
ANION GAP SERPL CALCULATED.3IONS-SCNC: 6 MMOL/L (ref 5–15)
BUN SERPL-MCNC: 9 MG/DL (ref 6–20)
BUN/CREAT SERPL: 9.2 (ref 7–25)
CALCIUM SPEC-SCNC: 8.8 MG/DL (ref 8.6–10.5)
CHLORIDE SERPL-SCNC: 103 MMOL/L (ref 98–107)
CO2 SERPL-SCNC: 29 MMOL/L (ref 22–29)
CREAT SERPL-MCNC: 0.98 MG/DL (ref 0.76–1.27)
GFR SERPL CREATININE-BSD FRML MDRD: 81 ML/MIN/1.73
GLUCOSE SERPL-MCNC: 109 MG/DL (ref 65–99)
NT-PROBNP SERPL-MCNC: 7.2 PG/ML (ref 0–450)
POTASSIUM SERPL-SCNC: 4.1 MMOL/L (ref 3.5–5.2)
SODIUM SERPL-SCNC: 138 MMOL/L (ref 136–145)

## 2021-07-21 PROCEDURE — 83880 ASSAY OF NATRIURETIC PEPTIDE: CPT | Performed by: NURSE PRACTITIONER

## 2021-07-21 PROCEDURE — 36415 COLL VENOUS BLD VENIPUNCTURE: CPT | Performed by: NURSE PRACTITIONER

## 2021-07-21 PROCEDURE — 80048 BASIC METABOLIC PNL TOTAL CA: CPT | Performed by: NURSE PRACTITIONER

## 2021-07-23 ENCOUNTER — HOME CARE VISIT (OUTPATIENT)
Dept: HOME HEALTH SERVICES | Facility: CLINIC | Age: 51
End: 2021-07-23

## 2021-07-23 PROCEDURE — G0299 HHS/HOSPICE OF RN EA 15 MIN: HCPCS

## 2021-07-23 PROCEDURE — G0300 HHS/HOSPICE OF LPN EA 15 MIN: HCPCS

## 2021-07-25 ENCOUNTER — HOME CARE VISIT (OUTPATIENT)
Dept: HOME HEALTH SERVICES | Facility: CLINIC | Age: 51
End: 2021-07-25

## 2021-07-25 VITALS
HEART RATE: 88 BPM | RESPIRATION RATE: 18 BRPM | SYSTOLIC BLOOD PRESSURE: 158 MMHG | DIASTOLIC BLOOD PRESSURE: 70 MMHG | TEMPERATURE: 98.6 F

## 2021-07-25 PROCEDURE — G0299 HHS/HOSPICE OF RN EA 15 MIN: HCPCS

## 2021-07-26 VITALS
HEART RATE: 90 BPM | TEMPERATURE: 98.2 F | SYSTOLIC BLOOD PRESSURE: 140 MMHG | RESPIRATION RATE: 20 BRPM | DIASTOLIC BLOOD PRESSURE: 82 MMHG

## 2021-07-27 ENCOUNTER — HOME CARE VISIT (OUTPATIENT)
Dept: HOME HEALTH SERVICES | Facility: CLINIC | Age: 51
End: 2021-07-27

## 2021-07-27 PROCEDURE — G0300 HHS/HOSPICE OF LPN EA 15 MIN: HCPCS

## 2021-07-27 PROCEDURE — G0299 HHS/HOSPICE OF RN EA 15 MIN: HCPCS

## 2021-07-28 ENCOUNTER — HOSPITAL ENCOUNTER (OUTPATIENT)
Dept: ULTRASOUND IMAGING | Facility: HOSPITAL | Age: 51
Discharge: HOME | End: 2021-07-28
Admitting: NURSE PRACTITIONER

## 2021-07-28 ENCOUNTER — OFFICE VISIT (OUTPATIENT)
Dept: WOUND CARE | Facility: HOSPITAL | Age: 51
End: 2021-07-28

## 2021-07-28 DIAGNOSIS — R60.0 LOWER EXTREMITY EDEMA: ICD-10-CM

## 2021-07-28 PROCEDURE — G0463 HOSPITAL OUTPT CLINIC VISIT: HCPCS

## 2021-07-28 PROCEDURE — 93970 EXTREMITY STUDY: CPT

## 2021-08-05 ENCOUNTER — HOME CARE VISIT (OUTPATIENT)
Dept: HOME HEALTH SERVICES | Facility: HOME HEALTHCARE | Age: 51
End: 2021-08-05

## 2021-08-05 ENCOUNTER — OFFICE VISIT (OUTPATIENT)
Dept: WOUND CARE | Facility: HOSPITAL | Age: 51
End: 2021-08-05

## 2021-08-11 ENCOUNTER — OFFICE VISIT (OUTPATIENT)
Dept: WOUND CARE | Facility: HOSPITAL | Age: 51
End: 2021-08-11

## 2021-08-13 ENCOUNTER — HOME CARE VISIT (OUTPATIENT)
Dept: HOME HEALTH SERVICES | Facility: HOME HEALTHCARE | Age: 51
End: 2021-08-13

## 2021-08-13 ENCOUNTER — HOME CARE VISIT (OUTPATIENT)
Dept: HOME HEALTH SERVICES | Facility: CLINIC | Age: 51
End: 2021-08-13

## 2021-08-13 VITALS
SYSTOLIC BLOOD PRESSURE: 142 MMHG | RESPIRATION RATE: 20 BRPM | DIASTOLIC BLOOD PRESSURE: 80 MMHG | TEMPERATURE: 98.1 F | HEART RATE: 88 BPM

## 2021-08-13 PROCEDURE — G0299 HHS/HOSPICE OF RN EA 15 MIN: HCPCS

## 2021-08-16 ENCOUNTER — HOME CARE VISIT (OUTPATIENT)
Dept: HOME HEALTH SERVICES | Facility: HOME HEALTHCARE | Age: 51
End: 2021-08-16

## 2021-08-18 ENCOUNTER — OFFICE VISIT (OUTPATIENT)
Dept: WOUND CARE | Facility: HOSPITAL | Age: 51
End: 2021-08-18

## 2021-08-26 ENCOUNTER — HOME CARE VISIT (OUTPATIENT)
Dept: HOME HEALTH SERVICES | Facility: HOME HEALTHCARE | Age: 51
End: 2021-08-26

## 2021-08-26 NOTE — CASE COMMUNICATION
Patient tested positive for Covid on 8/25/21. qurantine is 10 days from that date. On next snv covid equipment will need to be taken to the home. Thanks

## 2021-08-27 ENCOUNTER — HOME CARE VISIT (OUTPATIENT)
Dept: HOME HEALTH SERVICES | Facility: CLINIC | Age: 51
End: 2021-08-27

## 2021-08-27 VITALS
HEART RATE: 78 BPM | SYSTOLIC BLOOD PRESSURE: 148 MMHG | RESPIRATION RATE: 20 BRPM | OXYGEN SATURATION: 96 % | DIASTOLIC BLOOD PRESSURE: 80 MMHG | TEMPERATURE: 97.7 F

## 2021-08-27 PROCEDURE — G0300 HHS/HOSPICE OF LPN EA 15 MIN: HCPCS

## 2021-08-31 ENCOUNTER — HOME CARE VISIT (OUTPATIENT)
Dept: HOME HEALTH SERVICES | Facility: CLINIC | Age: 51
End: 2021-08-31

## 2021-08-31 VITALS
TEMPERATURE: 98.1 F | OXYGEN SATURATION: 97 % | RESPIRATION RATE: 18 BRPM | HEART RATE: 80 BPM | DIASTOLIC BLOOD PRESSURE: 78 MMHG | SYSTOLIC BLOOD PRESSURE: 138 MMHG

## 2021-08-31 PROCEDURE — G0300 HHS/HOSPICE OF LPN EA 15 MIN: HCPCS

## 2021-09-01 ENCOUNTER — HOME CARE VISIT (OUTPATIENT)
Dept: HOME HEALTH SERVICES | Facility: CLINIC | Age: 51
End: 2021-09-01

## 2021-09-03 ENCOUNTER — HOME CARE VISIT (OUTPATIENT)
Dept: HOME HEALTH SERVICES | Facility: HOME HEALTHCARE | Age: 51
End: 2021-09-03

## 2021-09-07 ENCOUNTER — HOME CARE VISIT (OUTPATIENT)
Dept: HOME HEALTH SERVICES | Facility: CLINIC | Age: 51
End: 2021-09-07

## 2021-09-09 ENCOUNTER — OFFICE VISIT (OUTPATIENT)
Dept: WOUND CARE | Facility: HOSPITAL | Age: 51
End: 2021-09-09

## 2021-09-10 ENCOUNTER — HOME CARE VISIT (OUTPATIENT)
Dept: HOME HEALTH SERVICES | Facility: CLINIC | Age: 51
End: 2021-09-10

## 2021-09-10 ENCOUNTER — HOME CARE VISIT (OUTPATIENT)
Dept: HOME HEALTH SERVICES | Facility: HOME HEALTHCARE | Age: 51
End: 2021-09-10

## 2021-09-15 NOTE — CASE COMMUNICATION
PER [facility] DRESSING CHANGE ONLY NEEDED ONCE THIS WEEK. PATIENT HAD UNNA BOOT CHANGED ON 8/31/21. WILL CONTINUE WITH PLAN OF CARE

## 2021-09-16 ENCOUNTER — OFFICE VISIT (OUTPATIENT)
Dept: WOUND CARE | Facility: HOSPITAL | Age: 51
End: 2021-09-16

## 2021-09-23 ENCOUNTER — OFFICE VISIT (OUTPATIENT)
Dept: WOUND CARE | Facility: HOSPITAL | Age: 51
End: 2021-09-23

## 2021-09-29 ENCOUNTER — OFFICE VISIT (OUTPATIENT)
Dept: PODIATRY | Facility: CLINIC | Age: 51
End: 2021-09-29

## 2021-09-29 ENCOUNTER — OFFICE VISIT (OUTPATIENT)
Dept: WOUND CARE | Facility: HOSPITAL | Age: 51
End: 2021-09-29

## 2021-09-29 VITALS
DIASTOLIC BLOOD PRESSURE: 82 MMHG | HEIGHT: 75 IN | OXYGEN SATURATION: 99 % | HEART RATE: 93 BPM | SYSTOLIC BLOOD PRESSURE: 148 MMHG | WEIGHT: 315 LBS | BODY MASS INDEX: 39.17 KG/M2

## 2021-09-29 DIAGNOSIS — M79.674 CHRONIC TOE PAIN, BILATERAL: Primary | ICD-10-CM

## 2021-09-29 DIAGNOSIS — L84 CORNS AND CALLOSITIES: ICD-10-CM

## 2021-09-29 DIAGNOSIS — G89.29 CHRONIC TOE PAIN, BILATERAL: Primary | ICD-10-CM

## 2021-09-29 DIAGNOSIS — M79.675 CHRONIC TOE PAIN, BILATERAL: Primary | ICD-10-CM

## 2021-09-29 DIAGNOSIS — B35.1 ONYCHOMYCOSIS: ICD-10-CM

## 2021-09-29 PROCEDURE — 11056 PARNG/CUTG B9 HYPRKR LES 2-4: CPT | Performed by: PODIATRIST

## 2021-09-29 PROCEDURE — G0463 HOSPITAL OUTPT CLINIC VISIT: HCPCS

## 2021-09-29 PROCEDURE — 99203 OFFICE O/P NEW LOW 30 MIN: CPT | Performed by: PODIATRIST

## 2021-09-29 PROCEDURE — 11721 DEBRIDE NAIL 6 OR MORE: CPT | Performed by: PODIATRIST

## 2021-09-29 NOTE — PROGRESS NOTES
Jalil Camarillo  1970  51 y.o. male  PCP: Ethan Casey 8/12/21     Nondiabetic foot care and bilateral callus   09/29/2021    Chief Complaint   Patient presents with   • Right Foot - Callouses, nondiabetic foot care   • Left Foot - Callouses, nondiabetic foot care       History of Present Illness    Jalil Camarillo is a 51 y.o.male who presents to clinic today requesting foot care.  States that he is unable to trim his toenails or the calluses on his feet due to the swelling in his legs and his truncal obesity.      Past Medical History:   Diagnosis Date   • Asthma    • Chest pain    • COPD (chronic obstructive pulmonary disease) (CMS/[facility])    • Edema    • History of degenerative disc disease    • Hyperlipidemia    • Hypertension    • Shortness of breath    • Thyroid disease    • Ulcer          Past Surgical History:   Procedure Laterality Date   • HERNIA REPAIR Right 04/20/2017    [place],, Dr. Lemus, [facility]         Family History   Adopted: Yes   Problem Relation Age of Onset   • Cancer Mother    • Diabetes Maternal Grandmother        No Known Allergies    Social History     Socioeconomic History   • Marital status: Single     Spouse name: Not on file   • Number of children: Not on file   • Years of education: Not on file   • Highest education level: Not on file   Tobacco Use   • Smoking status: Former Smoker     Types: Cigarettes   • Smokeless tobacco: Never Used   Substance and Sexual Activity   • Alcohol use: No   • Drug use: No   • Sexual activity: Defer         Current Outpatient Medications   Medication Sig Dispense Refill   • IBUPROFEN PO Take 800 mg by mouth 4 (Four) Times a Day As Needed.     • Omeprazole (PRILOSEC PO) Take 20 mg by mouth Daily As Needed.       No current facility-administered medications for this visit.       Review of Systems   Constitutional: Negative.    HENT: Negative.    Eyes: Negative.    Respiratory: Negative.    Cardiovascular: Positive for leg swelling.  "  Gastrointestinal: Negative.    Endocrine: Negative.    Genitourinary: Negative.    Musculoskeletal: Negative.         Joint pain   Foot pain     Allergic/Immunologic: Negative.    Neurological: Negative.    Hematological: Negative.    Psychiatric/Behavioral: Negative.          OBJECTIVE    /82   Pulse 93   Ht 190.5 cm (75\")   Wt (!) 146 kg (321 lb)   SpO2 99%   BMI 40.12 kg/m²     Physical Exam  Vitals reviewed.   Constitutional:       General: He is not in acute distress.     Appearance: He is well-developed. He is obese.   HENT:      Head: Normocephalic and atraumatic.      Nose: Nose normal.   Eyes:      Conjunctiva/sclera: Conjunctivae normal.      Pupils: Pupils are equal, round, and reactive to light.   Pulmonary:      Effort: Pulmonary effort is normal. No respiratory distress.      Breath sounds: No wheezing.   Musculoskeletal:         General: Swelling present. No deformity. Normal range of motion.   Skin:     General: Skin is warm and dry.      Capillary Refill: Capillary refill takes less than 2 seconds.   Neurological:      Mental Status: He is alert and oriented to person, place, and time.   Psychiatric:         Behavior: Behavior normal.         Thought Content: Thought content normal.          Lower Extremity Exam:     Cardiovascular:    DP/PT pulses nonpalpable  2/2 edema b/l    CFT brisk  to all digits b/l  Pitting edema noted to bilateral lower extremities  Musculoskeletal:  Muscle strength is 5/5 for all muscle groups tested b/l  Dermatological:   Webspaces 1-4 b/l are clean, dry and intact.   No subcutaneous nodules or masses noted  b/l  Nails 1-5 b/l are slightly thickened, discolored, elongated with subungual debris.  Large hyperkeratotic lesions noted to the medial heels bilateral.  Neurological:   Protective sensation intact b/l  Sensation intact to light touch b/l       Foot/Ankle Exam:       General:   Appearance: obesity            Procedures        ASSESSMENT AND " PLAN    Diagnoses and all orders for this visit:    1. Chronic toe pain, bilateral (Primary)    2. Onychomycosis    3. Corns and callosities        - Comprehensive foot and ankle exam performed.   - Nails 1-5 bilateral were debrided in length and thickness with nail nipper and electric  to decrease fungal load and risk of infection.  - Trimmed hyperkeratotic lesions noted in objective with a #15 blade without incident.   - All questions were answered to the patients satisfaction.  - RTC 3 months as needed             This document has been electronically signed by Jerardo Morrison DPM on September 29, 2021 15:09 CDT     9/29/2021  15:09 CDT

## 2022-02-26 ENCOUNTER — HOSPITAL ENCOUNTER (INPATIENT)
Facility: HOSPITAL | Age: 52
LOS: 9 days | Discharge: HOME HEALTH | End: 2022-03-07
Attending: INTERNAL MEDICINE | Admitting: HOSPITALIST

## 2022-02-26 ENCOUNTER — APPOINTMENT (OUTPATIENT)
Dept: GENERAL RADIOLOGY | Facility: HOSPITAL | Age: 52
End: 2022-02-26

## 2022-02-26 DIAGNOSIS — L03.115 CELLULITIS OF RIGHT LOWER EXTREMITY: Primary | ICD-10-CM

## 2022-02-26 DIAGNOSIS — Z78.9 IMPAIRED MOBILITY AND ACTIVITIES OF DAILY LIVING: ICD-10-CM

## 2022-02-26 DIAGNOSIS — Z74.09 IMPAIRED FUNCTIONAL MOBILITY, BALANCE, GAIT, AND ENDURANCE: ICD-10-CM

## 2022-02-26 DIAGNOSIS — Z74.09 IMPAIRED MOBILITY AND ACTIVITIES OF DAILY LIVING: ICD-10-CM

## 2022-02-26 DIAGNOSIS — L03.90 CHRONIC CELLULITIS: Chronic | ICD-10-CM

## 2022-02-26 LAB
ALBUMIN SERPL-MCNC: 4 G/DL (ref 3.5–5.2)
ALBUMIN/GLOB SERPL: 1.4 G/DL
ALP SERPL-CCNC: 90 U/L (ref 39–117)
ALT SERPL W P-5'-P-CCNC: 33 U/L (ref 1–41)
ANION GAP SERPL CALCULATED.3IONS-SCNC: 9 MMOL/L (ref 5–15)
AST SERPL-CCNC: 24 U/L (ref 1–40)
BASOPHILS # BLD AUTO: 0.02 10*3/MM3 (ref 0–0.2)
BASOPHILS NFR BLD AUTO: 0.3 % (ref 0–1.5)
BILIRUB SERPL-MCNC: 0.5 MG/DL (ref 0–1.2)
BUN SERPL-MCNC: 10 MG/DL (ref 6–20)
BUN/CREAT SERPL: 12.2 (ref 7–25)
CALCIUM SPEC-SCNC: 9.2 MG/DL (ref 8.6–10.5)
CHLORIDE SERPL-SCNC: 107 MMOL/L (ref 98–107)
CK SERPL-CCNC: 253 U/L (ref 20–200)
CO2 SERPL-SCNC: 26 MMOL/L (ref 22–29)
CREAT SERPL-MCNC: 0.82 MG/DL (ref 0.76–1.27)
D-LACTATE SERPL-SCNC: 1.3 MMOL/L (ref 0.5–2)
DEPRECATED RDW RBC AUTO: 49.5 FL (ref 37–54)
EOSINOPHIL # BLD AUTO: 0.25 10*3/MM3 (ref 0–0.4)
EOSINOPHIL NFR BLD AUTO: 3.8 % (ref 0.3–6.2)
ERYTHROCYTE [DISTWIDTH] IN BLOOD BY AUTOMATED COUNT: 16 % (ref 12.3–15.4)
FLUAV RNA RESP QL NAA+PROBE: NOT DETECTED
FLUBV RNA RESP QL NAA+PROBE: NOT DETECTED
GFR SERPL CREATININE-BSD FRML MDRD: 99 ML/MIN/1.73
GLOBULIN UR ELPH-MCNC: 2.9 GM/DL
GLUCOSE SERPL-MCNC: 107 MG/DL (ref 65–99)
HCT VFR BLD AUTO: 36.7 % (ref 37.5–51)
HGB BLD-MCNC: 12 G/DL (ref 13–17.7)
HOLD SPECIMEN: NORMAL
IMM GRANULOCYTES # BLD AUTO: 0.02 10*3/MM3 (ref 0–0.05)
IMM GRANULOCYTES NFR BLD AUTO: 0.3 % (ref 0–0.5)
LYMPHOCYTES # BLD AUTO: 1.24 10*3/MM3 (ref 0.7–3.1)
LYMPHOCYTES NFR BLD AUTO: 18.9 % (ref 19.6–45.3)
MCH RBC QN AUTO: 28.1 PG (ref 26.6–33)
MCHC RBC AUTO-ENTMCNC: 32.7 G/DL (ref 31.5–35.7)
MCV RBC AUTO: 85.9 FL (ref 79–97)
MONOCYTES # BLD AUTO: 0.54 10*3/MM3 (ref 0.1–0.9)
MONOCYTES NFR BLD AUTO: 8.2 % (ref 5–12)
NEUTROPHILS NFR BLD AUTO: 4.5 10*3/MM3 (ref 1.7–7)
NEUTROPHILS NFR BLD AUTO: 68.5 % (ref 42.7–76)
NRBC BLD AUTO-RTO: 0 /100 WBC (ref 0–0.2)
PLATELET # BLD AUTO: 202 10*3/MM3 (ref 140–450)
PMV BLD AUTO: 10 FL (ref 6–12)
POTASSIUM SERPL-SCNC: 3.6 MMOL/L (ref 3.5–5.2)
PROT SERPL-MCNC: 6.9 G/DL (ref 6–8.5)
RBC # BLD AUTO: 4.27 10*6/MM3 (ref 4.14–5.8)
SARS-COV-2 RNA RESP QL NAA+PROBE: NOT DETECTED
SODIUM SERPL-SCNC: 142 MMOL/L (ref 136–145)
WBC NRBC COR # BLD: 6.57 10*3/MM3 (ref 3.4–10.8)
WHOLE BLOOD HOLD SPECIMEN: NORMAL
WHOLE BLOOD HOLD SPECIMEN: NORMAL

## 2022-02-26 PROCEDURE — 36415 COLL VENOUS BLD VENIPUNCTURE: CPT | Performed by: STUDENT IN AN ORGANIZED HEALTH CARE EDUCATION/TRAINING PROGRAM

## 2022-02-26 PROCEDURE — 25010000002 VANCOMYCIN 10 G RECONSTITUTED SOLUTION: Performed by: INTERNAL MEDICINE

## 2022-02-26 PROCEDURE — G0378 HOSPITAL OBSERVATION PER HR: HCPCS

## 2022-02-26 PROCEDURE — 87040 BLOOD CULTURE FOR BACTERIA: CPT | Performed by: STUDENT IN AN ORGANIZED HEALTH CARE EDUCATION/TRAINING PROGRAM

## 2022-02-26 PROCEDURE — 80053 COMPREHEN METABOLIC PANEL: CPT | Performed by: STUDENT IN AN ORGANIZED HEALTH CARE EDUCATION/TRAINING PROGRAM

## 2022-02-26 PROCEDURE — 25010000002 HEPARIN (PORCINE) PER 1000 UNITS: Performed by: INTERNAL MEDICINE

## 2022-02-26 PROCEDURE — 85025 COMPLETE CBC W/AUTO DIFF WBC: CPT | Performed by: STUDENT IN AN ORGANIZED HEALTH CARE EDUCATION/TRAINING PROGRAM

## 2022-02-26 PROCEDURE — 83605 ASSAY OF LACTIC ACID: CPT | Performed by: STUDENT IN AN ORGANIZED HEALTH CARE EDUCATION/TRAINING PROGRAM

## 2022-02-26 PROCEDURE — 82550 ASSAY OF CK (CPK): CPT | Performed by: STUDENT IN AN ORGANIZED HEALTH CARE EDUCATION/TRAINING PROGRAM

## 2022-02-26 PROCEDURE — 87636 SARSCOV2 & INF A&B AMP PRB: CPT | Performed by: STUDENT IN AN ORGANIZED HEALTH CARE EDUCATION/TRAINING PROGRAM

## 2022-02-26 PROCEDURE — 71045 X-RAY EXAM CHEST 1 VIEW: CPT

## 2022-02-26 PROCEDURE — 99284 EMERGENCY DEPT VISIT MOD MDM: CPT

## 2022-02-26 RX ORDER — BUPIVACAINE HCL/0.9 % NACL/PF 0.1 %
2 PLASTIC BAG, INJECTION (ML) EPIDURAL ONCE
Status: DISCONTINUED | OUTPATIENT
Start: 2022-02-26 | End: 2022-02-26

## 2022-02-26 RX ORDER — SODIUM CHLORIDE 0.9 % (FLUSH) 0.9 %
10 SYRINGE (ML) INJECTION EVERY 12 HOURS SCHEDULED
Status: DISCONTINUED | OUTPATIENT
Start: 2022-02-26 | End: 2022-03-07 | Stop reason: HOSPADM

## 2022-02-26 RX ORDER — HEPARIN SODIUM 5000 [USP'U]/ML
5000 INJECTION, SOLUTION INTRAVENOUS; SUBCUTANEOUS EVERY 12 HOURS SCHEDULED
Status: DISCONTINUED | OUTPATIENT
Start: 2022-02-26 | End: 2022-03-07 | Stop reason: HOSPADM

## 2022-02-26 RX ORDER — OXYCODONE HYDROCHLORIDE AND ACETAMINOPHEN 5; 325 MG/1; MG/1
1 TABLET ORAL EVERY 4 HOURS PRN
Status: DISCONTINUED | OUTPATIENT
Start: 2022-02-26 | End: 2022-02-27

## 2022-02-26 RX ORDER — SODIUM CHLORIDE 0.9 % (FLUSH) 0.9 %
10 SYRINGE (ML) INJECTION AS NEEDED
Status: DISCONTINUED | OUTPATIENT
Start: 2022-02-26 | End: 2022-03-07 | Stop reason: HOSPADM

## 2022-02-26 RX ORDER — OMEPRAZOLE 20 MG/1
20 CAPSULE, DELAYED RELEASE ORAL
Status: DISCONTINUED | OUTPATIENT
Start: 2022-02-27 | End: 2022-02-27 | Stop reason: CLARIF

## 2022-02-26 RX ADMIN — HEPARIN SODIUM 5000 UNITS: 5000 INJECTION, SOLUTION INTRAVENOUS; SUBCUTANEOUS at 20:05

## 2022-02-26 RX ADMIN — VANCOMYCIN HYDROCHLORIDE 2250 MG: 10 INJECTION, POWDER, LYOPHILIZED, FOR SOLUTION INTRAVENOUS at 17:56

## 2022-02-26 RX ADMIN — OXYCODONE HYDROCHLORIDE AND ACETAMINOPHEN 1 TABLET: 5; 325 TABLET ORAL at 19:16

## 2022-02-26 RX ADMIN — Medication 10 ML: at 20:06

## 2022-02-27 LAB
ANION GAP SERPL CALCULATED.3IONS-SCNC: 10 MMOL/L (ref 5–15)
BASOPHILS # BLD AUTO: 0.02 10*3/MM3 (ref 0–0.2)
BASOPHILS NFR BLD AUTO: 0.4 % (ref 0–1.5)
BUN SERPL-MCNC: 11 MG/DL (ref 6–20)
BUN/CREAT SERPL: 12.6 (ref 7–25)
CALCIUM SPEC-SCNC: 8.9 MG/DL (ref 8.6–10.5)
CHLORIDE SERPL-SCNC: 107 MMOL/L (ref 98–107)
CO2 SERPL-SCNC: 25 MMOL/L (ref 22–29)
CREAT SERPL-MCNC: 0.87 MG/DL (ref 0.76–1.27)
DEPRECATED RDW RBC AUTO: 50.4 FL (ref 37–54)
EOSINOPHIL # BLD AUTO: 0.28 10*3/MM3 (ref 0–0.4)
EOSINOPHIL NFR BLD AUTO: 5.5 % (ref 0.3–6.2)
ERYTHROCYTE [DISTWIDTH] IN BLOOD BY AUTOMATED COUNT: 16 % (ref 12.3–15.4)
GFR SERPL CREATININE-BSD FRML MDRD: 93 ML/MIN/1.73
GLUCOSE SERPL-MCNC: 109 MG/DL (ref 65–99)
HCT VFR BLD AUTO: 34.4 % (ref 37.5–51)
HGB BLD-MCNC: 11.1 G/DL (ref 13–17.7)
IMM GRANULOCYTES # BLD AUTO: 0.01 10*3/MM3 (ref 0–0.05)
IMM GRANULOCYTES NFR BLD AUTO: 0.2 % (ref 0–0.5)
LYMPHOCYTES # BLD AUTO: 1.65 10*3/MM3 (ref 0.7–3.1)
LYMPHOCYTES NFR BLD AUTO: 32.3 % (ref 19.6–45.3)
MCH RBC QN AUTO: 28 PG (ref 26.6–33)
MCHC RBC AUTO-ENTMCNC: 32.3 G/DL (ref 31.5–35.7)
MCV RBC AUTO: 86.9 FL (ref 79–97)
MONOCYTES # BLD AUTO: 0.58 10*3/MM3 (ref 0.1–0.9)
MONOCYTES NFR BLD AUTO: 11.4 % (ref 5–12)
NEUTROPHILS NFR BLD AUTO: 2.57 10*3/MM3 (ref 1.7–7)
NEUTROPHILS NFR BLD AUTO: 50.2 % (ref 42.7–76)
NRBC BLD AUTO-RTO: 0 /100 WBC (ref 0–0.2)
PLATELET # BLD AUTO: 190 10*3/MM3 (ref 140–450)
PMV BLD AUTO: 9.9 FL (ref 6–12)
POTASSIUM SERPL-SCNC: 3.7 MMOL/L (ref 3.5–5.2)
RBC # BLD AUTO: 3.96 10*6/MM3 (ref 4.14–5.8)
SODIUM SERPL-SCNC: 142 MMOL/L (ref 136–145)
VANCOMYCIN PEAK SERPL-MCNC: 47.4 MCG/ML (ref 20–40)
WBC NRBC COR # BLD: 5.11 10*3/MM3 (ref 3.4–10.8)

## 2022-02-27 PROCEDURE — 80202 ASSAY OF VANCOMYCIN: CPT | Performed by: INTERNAL MEDICINE

## 2022-02-27 PROCEDURE — G0378 HOSPITAL OBSERVATION PER HR: HCPCS

## 2022-02-27 PROCEDURE — 25010000002 HEPARIN (PORCINE) PER 1000 UNITS: Performed by: INTERNAL MEDICINE

## 2022-02-27 PROCEDURE — 80048 BASIC METABOLIC PNL TOTAL CA: CPT | Performed by: INTERNAL MEDICINE

## 2022-02-27 PROCEDURE — 85025 COMPLETE CBC W/AUTO DIFF WBC: CPT | Performed by: INTERNAL MEDICINE

## 2022-02-27 PROCEDURE — 25010000002 VANCOMYCIN 10 G RECONSTITUTED SOLUTION: Performed by: INTERNAL MEDICINE

## 2022-02-27 RX ORDER — OXYCODONE AND ACETAMINOPHEN 10; 325 MG/1; MG/1
1 TABLET ORAL EVERY 4 HOURS PRN
Status: DISPENSED | OUTPATIENT
Start: 2022-02-27 | End: 2022-03-06

## 2022-02-27 RX ORDER — PANTOPRAZOLE SODIUM 40 MG/1
40 TABLET, DELAYED RELEASE ORAL
Status: DISCONTINUED | OUTPATIENT
Start: 2022-02-27 | End: 2022-03-07 | Stop reason: HOSPADM

## 2022-02-27 RX ADMIN — HEPARIN SODIUM 5000 UNITS: 5000 INJECTION, SOLUTION INTRAVENOUS; SUBCUTANEOUS at 20:39

## 2022-02-27 RX ADMIN — Medication 10 ML: at 08:01

## 2022-02-27 RX ADMIN — HEPARIN SODIUM 5000 UNITS: 5000 INJECTION, SOLUTION INTRAVENOUS; SUBCUTANEOUS at 08:00

## 2022-02-27 RX ADMIN — OXYCODONE HYDROCHLORIDE AND ACETAMINOPHEN 1 TABLET: 5; 325 TABLET ORAL at 13:53

## 2022-02-27 RX ADMIN — OXYCODONE HYDROCHLORIDE AND ACETAMINOPHEN 1 TABLET: 10; 325 TABLET ORAL at 18:20

## 2022-02-27 RX ADMIN — PANTOPRAZOLE SODIUM 40 MG: 40 TABLET, DELAYED RELEASE ORAL at 05:30

## 2022-02-27 RX ADMIN — VANCOMYCIN HYDROCHLORIDE 2500 MG: 10 INJECTION, POWDER, LYOPHILIZED, FOR SOLUTION INTRAVENOUS at 05:30

## 2022-02-27 RX ADMIN — OXYCODONE HYDROCHLORIDE AND ACETAMINOPHEN 1 TABLET: 5; 325 TABLET ORAL at 05:40

## 2022-02-27 RX ADMIN — Medication 10 ML: at 20:39

## 2022-02-27 RX ADMIN — VANCOMYCIN HYDROCHLORIDE 2500 MG: 10 INJECTION, POWDER, LYOPHILIZED, FOR SOLUTION INTRAVENOUS at 17:28

## 2022-02-28 PROBLEM — L03.115 CELLULITIS OF RIGHT LOWER EXTREMITY: Status: ACTIVE | Noted: 2022-02-28

## 2022-02-28 LAB — VANCOMYCIN TROUGH SERPL-MCNC: 21.2 MCG/ML (ref 5–20)

## 2022-02-28 PROCEDURE — 25010000002 HEPARIN (PORCINE) PER 1000 UNITS: Performed by: INTERNAL MEDICINE

## 2022-02-28 PROCEDURE — 25010000002 VANCOMYCIN: Performed by: INTERNAL MEDICINE

## 2022-02-28 PROCEDURE — 80202 ASSAY OF VANCOMYCIN: CPT | Performed by: INTERNAL MEDICINE

## 2022-02-28 RX ADMIN — HEPARIN SODIUM 5000 UNITS: 5000 INJECTION, SOLUTION INTRAVENOUS; SUBCUTANEOUS at 08:05

## 2022-02-28 RX ADMIN — OXYCODONE HYDROCHLORIDE AND ACETAMINOPHEN 1 TABLET: 10; 325 TABLET ORAL at 12:05

## 2022-02-28 RX ADMIN — Medication 10 ML: at 08:05

## 2022-02-28 RX ADMIN — OXYCODONE HYDROCHLORIDE AND ACETAMINOPHEN 1 TABLET: 10; 325 TABLET ORAL at 18:36

## 2022-02-28 RX ADMIN — Medication 10 ML: at 19:55

## 2022-02-28 RX ADMIN — Medication 1 APPLICATION: at 15:45

## 2022-02-28 RX ADMIN — PANTOPRAZOLE SODIUM 40 MG: 40 TABLET, DELAYED RELEASE ORAL at 05:04

## 2022-02-28 RX ADMIN — VANCOMYCIN HYDROCHLORIDE 1500 MG: 10 INJECTION, POWDER, LYOPHILIZED, FOR SOLUTION INTRAVENOUS at 17:15

## 2022-02-28 RX ADMIN — HEPARIN SODIUM 5000 UNITS: 5000 INJECTION, SOLUTION INTRAVENOUS; SUBCUTANEOUS at 19:53

## 2022-02-28 RX ADMIN — OXYCODONE HYDROCHLORIDE AND ACETAMINOPHEN 1 TABLET: 10; 325 TABLET ORAL at 05:05

## 2022-03-01 LAB
ANION GAP SERPL CALCULATED.3IONS-SCNC: 8 MMOL/L (ref 5–15)
BASOPHILS # BLD AUTO: 0.02 10*3/MM3 (ref 0–0.2)
BASOPHILS NFR BLD AUTO: 0.5 % (ref 0–1.5)
BUN SERPL-MCNC: 11 MG/DL (ref 6–20)
BUN/CREAT SERPL: 12.6 (ref 7–25)
CALCIUM SPEC-SCNC: 8.5 MG/DL (ref 8.6–10.5)
CHLORIDE SERPL-SCNC: 104 MMOL/L (ref 98–107)
CO2 SERPL-SCNC: 24 MMOL/L (ref 22–29)
CREAT SERPL-MCNC: 0.87 MG/DL (ref 0.76–1.27)
DEPRECATED RDW RBC AUTO: 48.6 FL (ref 37–54)
EGFRCR SERPLBLD CKD-EPI 2021: 105.1 ML/MIN/1.73
EOSINOPHIL # BLD AUTO: 0.29 10*3/MM3 (ref 0–0.4)
EOSINOPHIL NFR BLD AUTO: 6.8 % (ref 0.3–6.2)
ERYTHROCYTE [DISTWIDTH] IN BLOOD BY AUTOMATED COUNT: 15.8 % (ref 12.3–15.4)
GLUCOSE SERPL-MCNC: 112 MG/DL (ref 65–99)
HCT VFR BLD AUTO: 32.1 % (ref 37.5–51)
HGB BLD-MCNC: 10.6 G/DL (ref 13–17.7)
IMM GRANULOCYTES # BLD AUTO: 0.01 10*3/MM3 (ref 0–0.05)
IMM GRANULOCYTES NFR BLD AUTO: 0.2 % (ref 0–0.5)
LYMPHOCYTES # BLD AUTO: 1.46 10*3/MM3 (ref 0.7–3.1)
LYMPHOCYTES NFR BLD AUTO: 34.2 % (ref 19.6–45.3)
MCH RBC QN AUTO: 28.3 PG (ref 26.6–33)
MCHC RBC AUTO-ENTMCNC: 33 G/DL (ref 31.5–35.7)
MCV RBC AUTO: 85.8 FL (ref 79–97)
MONOCYTES # BLD AUTO: 0.43 10*3/MM3 (ref 0.1–0.9)
MONOCYTES NFR BLD AUTO: 10.1 % (ref 5–12)
NEUTROPHILS NFR BLD AUTO: 2.06 10*3/MM3 (ref 1.7–7)
NEUTROPHILS NFR BLD AUTO: 48.2 % (ref 42.7–76)
NRBC BLD AUTO-RTO: 0 /100 WBC (ref 0–0.2)
PLATELET # BLD AUTO: 184 10*3/MM3 (ref 140–450)
PMV BLD AUTO: 9.6 FL (ref 6–12)
POTASSIUM SERPL-SCNC: 3.6 MMOL/L (ref 3.5–5.2)
RBC # BLD AUTO: 3.74 10*6/MM3 (ref 4.14–5.8)
SODIUM SERPL-SCNC: 136 MMOL/L (ref 136–145)
WBC NRBC COR # BLD: 4.27 10*3/MM3 (ref 3.4–10.8)

## 2022-03-01 PROCEDURE — 25010000002 VANCOMYCIN 10 G RECONSTITUTED SOLUTION: Performed by: INTERNAL MEDICINE

## 2022-03-01 PROCEDURE — 94799 UNLISTED PULMONARY SVC/PX: CPT

## 2022-03-01 PROCEDURE — 85025 COMPLETE CBC W/AUTO DIFF WBC: CPT | Performed by: HOSPITALIST

## 2022-03-01 PROCEDURE — 80048 BASIC METABOLIC PNL TOTAL CA: CPT | Performed by: HOSPITALIST

## 2022-03-01 PROCEDURE — 25010000002 HEPARIN (PORCINE) PER 1000 UNITS: Performed by: INTERNAL MEDICINE

## 2022-03-01 RX ORDER — LINEZOLID 600 MG/1
600 TABLET, FILM COATED ORAL EVERY 12 HOURS SCHEDULED
Status: COMPLETED | OUTPATIENT
Start: 2022-03-02 | End: 2022-03-05

## 2022-03-01 RX ORDER — IPRATROPIUM BROMIDE AND ALBUTEROL SULFATE 2.5; .5 MG/3ML; MG/3ML
3 SOLUTION RESPIRATORY (INHALATION)
Status: DISCONTINUED | OUTPATIENT
Start: 2022-03-01 | End: 2022-03-05

## 2022-03-01 RX ORDER — ALBUTEROL SULFATE 2.5 MG/3ML
2.5 SOLUTION RESPIRATORY (INHALATION) EVERY 4 HOURS PRN
Status: DISCONTINUED | OUTPATIENT
Start: 2022-03-01 | End: 2022-03-05 | Stop reason: SDUPTHER

## 2022-03-01 RX ORDER — ALBUTEROL SULFATE 2.5 MG/3ML
2.5 SOLUTION RESPIRATORY (INHALATION) EVERY 4 HOURS PRN
Status: CANCELLED | OUTPATIENT
Start: 2022-03-01

## 2022-03-01 RX ADMIN — Medication 10 ML: at 08:06

## 2022-03-01 RX ADMIN — VANCOMYCIN HYDROCHLORIDE 1500 MG: 10 INJECTION, POWDER, LYOPHILIZED, FOR SOLUTION INTRAVENOUS at 05:20

## 2022-03-01 RX ADMIN — HEPARIN SODIUM 5000 UNITS: 5000 INJECTION, SOLUTION INTRAVENOUS; SUBCUTANEOUS at 08:06

## 2022-03-01 RX ADMIN — PANTOPRAZOLE SODIUM 40 MG: 40 TABLET, DELAYED RELEASE ORAL at 05:25

## 2022-03-01 RX ADMIN — OXYCODONE HYDROCHLORIDE AND ACETAMINOPHEN 1 TABLET: 10; 325 TABLET ORAL at 16:25

## 2022-03-01 RX ADMIN — OXYCODONE HYDROCHLORIDE AND ACETAMINOPHEN 1 TABLET: 10; 325 TABLET ORAL at 03:39

## 2022-03-01 RX ADMIN — IPRATROPIUM BROMIDE AND ALBUTEROL SULFATE 3 ML: 2.5; .5 SOLUTION RESPIRATORY (INHALATION) at 14:55

## 2022-03-01 RX ADMIN — HEPARIN SODIUM 5000 UNITS: 5000 INJECTION, SOLUTION INTRAVENOUS; SUBCUTANEOUS at 20:42

## 2022-03-01 RX ADMIN — Medication 10 ML: at 20:42

## 2022-03-01 RX ADMIN — VANCOMYCIN HYDROCHLORIDE 1500 MG: 10 INJECTION, POWDER, LYOPHILIZED, FOR SOLUTION INTRAVENOUS at 17:06

## 2022-03-01 RX ADMIN — IPRATROPIUM BROMIDE AND ALBUTEROL SULFATE 3 ML: 2.5; .5 SOLUTION RESPIRATORY (INHALATION) at 18:51

## 2022-03-01 RX ADMIN — Medication 1 APPLICATION: at 08:06

## 2022-03-01 NOTE — PLAN OF CARE
Goal Outcome Evaluation:            Item 0 Points 1 Point 2 Points 3 Points 4 Points Subtotal   Mental Status Alert, oriented, cooperative Lethargic, follows commands Confused, not following commands Obtunded or Somnolent Comatose 0   Respiratory Pattern Regular RR 8-16 breaths/minute Increased RR 18-25 breaths/minute Dyspnea on exertion, irregular RR 26-30 breaths/minute Shortness of breath,  RR 31-35 breaths/minute Accessory muscle use, severe SOB  RR > 35 breaths/minute 1   Breath Sounds Clear Decreased unilaterally Decreased bilaterally Basilar crackles Wheezing and/or rhonchi 4   Cough Strong, spontaneous, non-productive Strong productive Weak, non-productive Weak, productive or weak with rhonchi Absent or may require suctioning 0   Pulmonary Status Nonsmoker, no previous history, >1 year quit < 1 PPD  <1 year quit > or = 1 PPD Diagnosed pulmonary disease (severe or chronic) Severe or chronic pulmonary disease with exacerbation    Surgical Status None General surgery (non-abdominal or non-thoracic) Lower abdominal Thoracic or upper abdominal Thoracic with pulmonary disease 0   Chest X-ray Clear Chronic changes Infiltrates, atelectasis or pleural effusion Infiltrates in > 1 lobe Diffuse infiltrates and atelectasis and/or effusions n/a   Activity   Level Ambulatory Ambulatory with assistance Non-ambulatory Paraplegic Quadriplegic 1        Total Score   6   Score    Drug Therapy Frequency 20 or >    Q4 Duoneb with Q2 Albuterol PRN 15-19    Q6 Duoneb with Q4 Albuterol PRN 10-14    QID Duoneb with Q4 Albuterol PRN 5-9    TID Duoneb with Q6 Albuterol PRN 0-4    Q4 PRN Duoneb                  Lung Expansion Therapy (PEP) Bronchopulmonary Hygiene (CPT)   Q4 & PRN - Severe atelectasis, poor oxygenation Q4 - Copious secretions, dyspnea, unable to sleep   QID - High risk for persistent atelectasis, existence of atelectasis QID & Q4 PRN - Moderate secretion production   TID - At risk for developing atelectasis TID - Small  amounts of secretions with poor cough   BID - Prevention of atelectasis BID - Unable to breathe deeply and cough spontaneously     RT Comments / Recommendations  TID Duoneb with Q6 albuterol PRN  Will reevaluate in 48 hours

## 2022-03-01 NOTE — PROGRESS NOTES
[facility] Medicine Services  INPATIENT PROGRESS NOTE    Length of Stay: 1  Date of Admission: 2/26/2022  Primary Care Physician: Ethan Casey MD    Subjective   Chief Complaint: Bilateral lower extremity redness and edema  HPI: May have slightly less pain today, but does not really feel that any swelling is better.  Some worsening shortness of breath.    Review of Systems   Constitutional: Negative for appetite change, chills, fatigue and fever.   Respiratory: Negative for chest tightness and shortness of breath.    Cardiovascular: Positive for leg swelling. Negative for chest pain and palpitations.   Gastrointestinal: Negative for abdominal pain, constipation, diarrhea, nausea and vomiting.   Skin: Positive for color change and wound.   Neurological: Negative for dizziness, weakness, light-headedness, numbness and headaches.        All pertinent negatives and positives are as above. All other systems have been reviewed and are negative unless otherwise stated.     Objective    Temp:  [96.3 °F (35.7 °C)-98.2 °F (36.8 °C)] 98 °F (36.7 °C)  Heart Rate:  [] 102  Resp:  [18] 18  BP: (105-135)/(57-82) 123/57    Physical Exam  Vitals reviewed.   Constitutional:       Appearance: He is well-developed. He is morbidly obese.      Comments: BMI 42.75   HENT:      Head: Normocephalic and atraumatic.   Eyes:      Pupils: Pupils are equal, round, and reactive to light.   Cardiovascular:      Rate and Rhythm: Normal rate and regular rhythm.      Heart sounds: Normal heart sounds. No murmur heard.  No friction rub. No gallop.    Pulmonary:      Effort: Pulmonary effort is normal. No respiratory distress.      Breath sounds: Normal breath sounds. No wheezing or rales.   Chest:      Chest wall: No tenderness.   Abdominal:      General: Bowel sounds are normal. There is no distension.      Palpations: Abdomen is soft.      Tenderness: There is no abdominal tenderness.    Musculoskeletal:      Cervical back: Normal range of motion and neck supple.      Right lower leg: Edema present.      Left lower leg: Edema present.   Psychiatric:         Behavior: Behavior normal.             Results Review:  I have reviewed the labs, radiology results, and diagnostic studies.    Laboratory Data:   Results from last 7 days   Lab Units 03/01/22  0906 02/27/22  0512 02/26/22  1523   SODIUM mmol/L 136 142 142   POTASSIUM mmol/L 3.6 3.7 3.6   CHLORIDE mmol/L 104 107 107   CO2 mmol/L 24.0 25.0 26.0   BUN mg/dL 11 11 10   CREATININE mg/dL 0.87 0.87 0.82   GLUCOSE mg/dL 112* 109* 107*   CALCIUM mg/dL 8.5* 8.9 9.2   BILIRUBIN mg/dL  --   --  0.5   ALK PHOS U/L  --   --  90   ALT (SGPT) U/L  --   --  33   AST (SGOT) U/L  --   --  24   ANION GAP mmol/L 8.0 10.0 9.0     Estimated Creatinine Clearance: 162.4 mL/min (by C-G formula based on SCr of 0.87 mg/dL).          Results from last 7 days   Lab Units 03/01/22  0906 02/27/22  0512 02/26/22  1523   WBC 10*3/mm3 4.27 5.11 6.57   HEMOGLOBIN g/dL 10.6* 11.1* 12.0*   HEMATOCRIT % 32.1* 34.4* 36.7*   PLATELETS 10*3/mm3 184 190 202           Culture Data:   Blood Culture   Date Value Ref Range Status   02/26/2022 No growth at 2 days  Preliminary   02/26/2022 No growth at 2 days  Preliminary     No results found for: URINECX  No results found for: RESPCX  No results found for: WOUNDCX  No results found for: STOOLCX  No components found for: BODYFLD    Radiology Data:   Imaging Results (Last 24 Hours)     ** No results found for the last 24 hours. **          I have reviewed the patient's current medications.     Assessment/Plan     Active Hospital Problems    Diagnosis    • **Chronic cellulitis    • Cellulitis of right lower extremity    • Chronic venous stasis    • Lymphedema    • Morbid obesity (HCC)        Plan:  1.  Chronic lower extremity cellulitis: Continue IV antibiotics.  2.  Lymphedema with chronic venous stasis: Wound care following.  Appreciate  their help.  3.  Morbid obesity: BMI 42.75.  4. Shortness of breath: Patient with some wheezes. Will start bronchodilators. Consider repeat chest x-ray.  5.  DVT prophylaxis: Heparin.          The patient was evaluated during the global COVID-19 pandemic, and the diagnosis was suspected/considered upon their initial presentation.  Evaluation, treatment, and testing were consistent with current guidelines for patients who present with complaints or symptoms that may be related to COVID-19.    I confirmed that the patient's Advance Care Plan is present, code status is documented, or surrogate decision maker is listed in the patient's medical record.       Discharge Planning: I expect patient to be discharged to home versus skilled facility in 3-4 days.        This document has been electronically signed by Yogi Keith MD on March 1, 2022 11:14 CST

## 2022-03-01 NOTE — PLAN OF CARE
Problem: Adult Inpatient Plan of Care  Goal: Absence of Hospital-Acquired Illness or Injury  Intervention: Identify and Manage Fall Risk  Recent Flowsheet Documentation  Taken 3/1/2022 0300 by Meseret Donaldson RN  Safety Promotion/Fall Prevention: safety round/check completed  Taken 3/1/2022 0100 by Meseret Donaldson RN  Safety Promotion/Fall Prevention: safety round/check completed  Taken 2/28/2022 2300 by Meseret Donaldson RN  Safety Promotion/Fall Prevention: safety round/check completed  Taken 2/28/2022 2100 by Meseret Donaldson RN  Safety Promotion/Fall Prevention: safety round/check completed  Taken 2/28/2022 1900 by Meseret Donaldson RN  Safety Promotion/Fall Prevention: safety round/check completed   Goal Outcome Evaluation:   Patient has been very cooperative with regiment no new issues this shift.

## 2022-03-02 LAB
ANION GAP SERPL CALCULATED.3IONS-SCNC: 8 MMOL/L (ref 5–15)
BASOPHILS # BLD AUTO: 0.01 10*3/MM3 (ref 0–0.2)
BASOPHILS NFR BLD AUTO: 0.3 % (ref 0–1.5)
BUN SERPL-MCNC: 9 MG/DL (ref 6–20)
BUN/CREAT SERPL: 11.7 (ref 7–25)
CALCIUM SPEC-SCNC: 8.8 MG/DL (ref 8.6–10.5)
CHLORIDE SERPL-SCNC: 107 MMOL/L (ref 98–107)
CO2 SERPL-SCNC: 25 MMOL/L (ref 22–29)
CREAT SERPL-MCNC: 0.77 MG/DL (ref 0.76–1.27)
DEPRECATED RDW RBC AUTO: 47.6 FL (ref 37–54)
EGFRCR SERPLBLD CKD-EPI 2021: 109.1 ML/MIN/1.73
EOSINOPHIL # BLD AUTO: 0.29 10*3/MM3 (ref 0–0.4)
EOSINOPHIL NFR BLD AUTO: 7.6 % (ref 0.3–6.2)
ERYTHROCYTE [DISTWIDTH] IN BLOOD BY AUTOMATED COUNT: 15.7 % (ref 12.3–15.4)
GLUCOSE SERPL-MCNC: 97 MG/DL (ref 65–99)
HCT VFR BLD AUTO: 31.8 % (ref 37.5–51)
HGB BLD-MCNC: 10.3 G/DL (ref 13–17.7)
IMM GRANULOCYTES # BLD AUTO: 0.01 10*3/MM3 (ref 0–0.05)
IMM GRANULOCYTES NFR BLD AUTO: 0.3 % (ref 0–0.5)
LYMPHOCYTES # BLD AUTO: 1.14 10*3/MM3 (ref 0.7–3.1)
LYMPHOCYTES NFR BLD AUTO: 29.9 % (ref 19.6–45.3)
MCH RBC QN AUTO: 27.3 PG (ref 26.6–33)
MCHC RBC AUTO-ENTMCNC: 32.4 G/DL (ref 31.5–35.7)
MCV RBC AUTO: 84.4 FL (ref 79–97)
MONOCYTES # BLD AUTO: 0.37 10*3/MM3 (ref 0.1–0.9)
MONOCYTES NFR BLD AUTO: 9.7 % (ref 5–12)
NEUTROPHILS NFR BLD AUTO: 1.99 10*3/MM3 (ref 1.7–7)
NEUTROPHILS NFR BLD AUTO: 52.2 % (ref 42.7–76)
NRBC BLD AUTO-RTO: 0 /100 WBC (ref 0–0.2)
PLATELET # BLD AUTO: 180 10*3/MM3 (ref 140–450)
PMV BLD AUTO: 9.7 FL (ref 6–12)
POTASSIUM SERPL-SCNC: 3.8 MMOL/L (ref 3.5–5.2)
RBC # BLD AUTO: 3.77 10*6/MM3 (ref 4.14–5.8)
SODIUM SERPL-SCNC: 140 MMOL/L (ref 136–145)
WBC NRBC COR # BLD: 3.81 10*3/MM3 (ref 3.4–10.8)

## 2022-03-02 PROCEDURE — 80048 BASIC METABOLIC PNL TOTAL CA: CPT | Performed by: HOSPITALIST

## 2022-03-02 PROCEDURE — 94799 UNLISTED PULMONARY SVC/PX: CPT

## 2022-03-02 PROCEDURE — 25010000002 HEPARIN (PORCINE) PER 1000 UNITS: Performed by: INTERNAL MEDICINE

## 2022-03-02 PROCEDURE — 85025 COMPLETE CBC W/AUTO DIFF WBC: CPT | Performed by: HOSPITALIST

## 2022-03-02 RX ADMIN — PANTOPRAZOLE SODIUM 40 MG: 40 TABLET, DELAYED RELEASE ORAL at 04:17

## 2022-03-02 RX ADMIN — IPRATROPIUM BROMIDE AND ALBUTEROL SULFATE 3 ML: 2.5; .5 SOLUTION RESPIRATORY (INHALATION) at 19:26

## 2022-03-02 RX ADMIN — IPRATROPIUM BROMIDE AND ALBUTEROL SULFATE 3 ML: 2.5; .5 SOLUTION RESPIRATORY (INHALATION) at 07:47

## 2022-03-02 RX ADMIN — Medication 10 ML: at 20:00

## 2022-03-02 RX ADMIN — OXYCODONE HYDROCHLORIDE AND ACETAMINOPHEN 1 TABLET: 10; 325 TABLET ORAL at 23:30

## 2022-03-02 RX ADMIN — HEPARIN SODIUM 5000 UNITS: 5000 INJECTION, SOLUTION INTRAVENOUS; SUBCUTANEOUS at 09:42

## 2022-03-02 RX ADMIN — LINEZOLID 600 MG: 600 TABLET, FILM COATED ORAL at 20:00

## 2022-03-02 RX ADMIN — OXYCODONE HYDROCHLORIDE AND ACETAMINOPHEN 1 TABLET: 10; 325 TABLET ORAL at 04:17

## 2022-03-02 RX ADMIN — OXYCODONE HYDROCHLORIDE AND ACETAMINOPHEN 1 TABLET: 10; 325 TABLET ORAL at 19:14

## 2022-03-02 RX ADMIN — HEPARIN SODIUM 5000 UNITS: 5000 INJECTION, SOLUTION INTRAVENOUS; SUBCUTANEOUS at 20:00

## 2022-03-02 RX ADMIN — Medication 10 ML: at 09:42

## 2022-03-02 RX ADMIN — LINEZOLID 600 MG: 600 TABLET, FILM COATED ORAL at 09:42

## 2022-03-02 RX ADMIN — Medication 1 APPLICATION: at 09:43

## 2022-03-02 RX ADMIN — IPRATROPIUM BROMIDE AND ALBUTEROL SULFATE 3 ML: 2.5; .5 SOLUTION RESPIRATORY (INHALATION) at 15:53

## 2022-03-02 NOTE — PAYOR COMM NOTE
"  Stephany Couch RN [facility]  [phone]    Phone  [phone]     Fax  Cont. Stay Review      Jalil Camarillo (51 y.o. Male)             Date of Birth Social Security Number Address Home Phone MRN    1970  [address] [place]  [place] [address] [phone] [id number]    [employer]ian Marital Status             [facility] Single       Admission Date Admission Type Admitting Provider Attending Provider Department, Room/Bed    2/26/22 Emergency Yogi Keith MD Popescu, Tudor, MD [facility] 3 EAST, 353/1    Discharge Date Discharge Disposition Discharge Destination                         Attending Provider: Claude Morris MD    Allergies: No Known Allergies    Isolation: None   Infection: COVID (History) (02/28/22)   Code Status: CPR   Advance Care Planning Activity    Ht: 190.5 cm (75\")   Wt: 155 kg (342 lb)    Admission Cmt: None   Principal Problem: Chronic cellulitis [L03.90]                 Active Insurance as of 2/26/2022     Primary Coverage     Payor Plan Insurance Group Employer/Plan Group    [facility] [id number]     Payor Plan Address Payor Plan Phone Number Payor Plan Fax Number Effective Dates    [address]   1/1/2021 - None Entered    [facility] [id number]       Subscriber Name Subscriber Birth Date Member ID       JALIL CAMARILLO 1970 [id number]                 Emergency Contacts      (Rel.) Home Phone Work Phone Mobile Phone    Alxe Camarillo (Brother) [phone] -- --            Vital Signs (last day)     Date/Time Temp Temp src Pulse Resp BP Patient Position SpO2    03/02/22 0910 -- -- 87 -- -- -- --    03/02/22 0757 98.6 (37) Tympanic 72 16 136/61 Sitting 95    03/02/22 0747 -- -- 69 15 -- -- 94    03/02/22 0401 97.7 (36.5) Temporal 73 20 129/62 Sitting 96    03/02/22 0047 -- -- 78 -- -- -- --    03/01/22 1956 97.7 (36.5) Temporal 90 20 131/60 Sitting 96    " 03/01/22 1851 -- -- 84 20 -- -- 94    03/01/22 1517 -- -- 99 -- -- -- --    03/01/22 1505 98.1 (36.7) Tympanic 93 20 128/71 Sitting 94    03/01/22 1502 -- -- 93 20 -- -- 100    03/01/22 1455 -- -- 95 20 -- -- 95    03/01/22 0744 -- -- 102 -- -- -- --    03/01/22 0736 98 (36.7) Tympanic 88 18 123/57 Sitting 91    03/01/22 0333 98 (36.7) Temporal 92 18 116/82 Lying 93          Oxygen Therapy (last day)     Date/Time SpO2 Device (Oxygen Therapy) Flow (L/min) Oxygen Concentration (%) ETCO2 (mmHg)    03/02/22 0757 95 room air -- -- --    03/02/22 0747 94 room air -- -- --    03/02/22 0401 96 room air -- -- --    03/01/22 2046 -- room air -- -- --    03/01/22 1956 96 room air -- -- --    03/01/22 1851 94 room air -- -- --    03/01/22 1505 94 room air -- -- --    03/01/22 1502 100 room air -- -- --    03/01/22 1455 95 room air -- -- --    03/01/22 0736 91 room air -- -- --    03/01/22 0708 -- room air -- -- --    03/01/22 0333 93 room air -- -- --          Current Facility-Administered Medications   Medication Dose Route Frequency Provider Last Rate Last Admin   • albuterol (PROVENTIL) nebulizer solution 0.083% 2.5 mg/3mL  2.5 mg Nebulization Q4H PRN Claude Morris MD       • Bag [place] ointment ointment 1 application  1 application Topical Daily Claude Morris MD   1 application at 03/02/22 0943   • heparin (porcine) 5000 UNIT/ML injection 5,000 Units  5,000 Units Subcutaneous Q12H Claude Morris MD   5,000 Units at 03/02/22 0942   • ipratropium-albuterol (DUO-NEB) nebulizer solution 3 mL  3 mL Nebulization TID - RT Claude Morris MD   3 mL at 03/02/22 0747   • linezolid (ZYVOX) tablet 600 mg  600 mg Oral Q12H Yogi Keith MD   600 mg at 03/02/22 0942   • oxyCODONE-acetaminophen (PERCOCET)  MG per tablet 1 tablet  1 tablet Oral Q4H PRN Claude Morris MD   1 tablet at 03/02/22 0417   • pantoprazole (PROTONIX) EC tablet 40 mg  40 mg Oral Q AM Claude Morris MD   40 mg at 03/02/22 0417   • sodium chloride 0.9  % flush 10 mL  10 mL Intravenous PRN Claude Morris MD       • sodium chloride 0.9 % flush 10 mL  10 mL Intravenous Q12H Claude Morris MD   10 mL at 03/02/22 0942   • sodium chloride 0.9 % flush 10 mL  10 mL Intravenous PRN Claude Morris MD            Physician Progress Notes (last 24 hours)      Yogi Keith MD at 03/01/22 1114              [facility] Medicine Services  INPATIENT PROGRESS NOTE    Length of Stay: 1  Date of Admission: 2/26/2022  Primary Care Physician: Ethan Casey MD    Subjective   Chief Complaint: Bilateral lower extremity redness and edema  HPI: May have slightly less pain today, but does not really feel that any swelling is better.  Some worsening shortness of breath.    Review of Systems   Constitutional: Negative for appetite change, chills, fatigue and fever.   Respiratory: Negative for chest tightness and shortness of breath.    Cardiovascular: Positive for leg swelling. Negative for chest pain and palpitations.   Gastrointestinal: Negative for abdominal pain, constipation, diarrhea, nausea and vomiting.   Skin: Positive for color change and wound.   Neurological: Negative for dizziness, weakness, light-headedness, numbness and headaches.        All pertinent negatives and positives are as above. All other systems have been reviewed and are negative unless otherwise stated.     Objective    Temp:  [96.3 °F (35.7 °C)-98.2 °F (36.8 °C)] 98 °F (36.7 °C)  Heart Rate:  [] 102  Resp:  [18] 18  BP: (105-135)/(57-82) 123/57    Physical Exam  Vitals reviewed.   Constitutional:       Appearance: He is well-developed. He is morbidly obese.      Comments: BMI 42.75   HENT:      Head: Normocephalic and atraumatic.   Eyes:      Pupils: Pupils are equal, round, and reactive to light.   Cardiovascular:      Rate and Rhythm: Normal rate and regular rhythm.      Heart sounds: Normal heart sounds. No murmur heard.  No friction rub. No  gallop.    Pulmonary:      Effort: Pulmonary effort is normal. No respiratory distress.      Breath sounds: Normal breath sounds. No wheezing or rales.   Chest:      Chest wall: No tenderness.   Abdominal:      General: Bowel sounds are normal. There is no distension.      Palpations: Abdomen is soft.      Tenderness: There is no abdominal tenderness.   Musculoskeletal:      Cervical back: Normal range of motion and neck supple.      Right lower leg: Edema present.      Left lower leg: Edema present.   Psychiatric:         Behavior: Behavior normal.             Results Review:  I have reviewed the labs, radiology results, and diagnostic studies.    Laboratory Data:   Results from last 7 days   Lab Units 03/01/22  0906 02/27/22  0512 02/26/22  1523   SODIUM mmol/L 136 142 142   POTASSIUM mmol/L 3.6 3.7 3.6   CHLORIDE mmol/L 104 107 107   CO2 mmol/L 24.0 25.0 26.0   BUN mg/dL 11 11 10   CREATININE mg/dL 0.87 0.87 0.82   GLUCOSE mg/dL 112* 109* 107*   CALCIUM mg/dL 8.5* 8.9 9.2   BILIRUBIN mg/dL  --   --  0.5   ALK PHOS U/L  --   --  90   ALT (SGPT) U/L  --   --  33   AST (SGOT) U/L  --   --  24   ANION GAP mmol/L 8.0 10.0 9.0     Estimated Creatinine Clearance: 162.4 mL/min (by C-G formula based on SCr of 0.87 mg/dL).          Results from last 7 days   Lab Units 03/01/22  0906 02/27/22  0512 02/26/22  1523   WBC 10*3/mm3 4.27 5.11 6.57   HEMOGLOBIN g/dL 10.6* 11.1* 12.0*   HEMATOCRIT % 32.1* 34.4* 36.7*   PLATELETS 10*3/mm3 184 190 202           Culture Data:   Blood Culture   Date Value Ref Range Status   02/26/2022 No growth at 2 days  Preliminary   02/26/2022 No growth at 2 days  Preliminary     No results found for: URINECX  No results found for: RESPCX  No results found for: WOUNDCX  No results found for: STOOLCX  No components found for: BODYFLD    Radiology Data:   Imaging Results (Last 24 Hours)     ** No results found for the last 24 hours. **          I have reviewed the patient's current medications.      Assessment/Plan     Active Hospital Problems    Diagnosis    • **Chronic cellulitis    • Cellulitis of right lower extremity    • Chronic venous stasis    • Lymphedema    • Morbid obesity (HCC)        Plan:  1.  Chronic lower extremity cellulitis: Continue IV antibiotics.  2.  Lymphedema with chronic venous stasis: Wound care following.  Appreciate their help.  3.  Morbid obesity: BMI 42.75.  4. Shortness of breath: Patient with some wheezes. Will start bronchodilators. Consider repeat chest x-ray.  5.  DVT prophylaxis: Heparin.          The patient was evaluated during the global COVID-19 pandemic, and the diagnosis was suspected/considered upon their initial presentation.  Evaluation, treatment, and testing were consistent with current guidelines for patients who present with complaints or symptoms that may be related to COVID-19.    I confirmed that the patient's Advance Care Plan is present, code status is documented, or surrogate decision maker is listed in the patient's medical record.       Discharge Planning: I expect patient to be discharged to home versus skilled facility in 3-4 days.        This document has been electronically signed by Yogi Keith MD on March 1, 2022 11:14 CST        Electronically signed by Yogi Keith MD at 03/01/22 1127       Medical Student Notes (last 24 hours)  Notes from 03/01/22 1035 through 03/02/22 1035   No notes of this type exist for this encounter.         Consult Notes (last 24 hours)  Notes from 03/01/22 1035 through 03/02/22 1035   No notes of this type exist for this encounter.

## 2022-03-02 NOTE — PLAN OF CARE
Goal Outcome Evaluation:  Plan of Care Reviewed With: patient        Progress: improving  Outcome Summary: pt resting well. vss. no signs of distress at this time.

## 2022-03-02 NOTE — PROGRESS NOTES
[facility] Medicine Services  INPATIENT PROGRESS NOTE    Length of Stay: 2  Date of Admission: 2/26/2022  Primary Care Physician: Ethan Casey MD    Subjective   Chief Complaint: Bilateral lower extremity redness and edema  HPI: Edema is somewhat better.  Still has some pain.    Review of Systems   Constitutional: Negative for appetite change, chills, fatigue and fever.   Respiratory: Negative for chest tightness and shortness of breath.    Cardiovascular: Positive for leg swelling. Negative for chest pain and palpitations.   Gastrointestinal: Negative for abdominal pain, constipation, diarrhea, nausea and vomiting.   Skin: Positive for color change and wound.   Neurological: Negative for dizziness, weakness, light-headedness, numbness and headaches.        All pertinent negatives and positives are as above. All other systems have been reviewed and are negative unless otherwise stated.     Objective    Temp:  [97.7 °F (36.5 °C)-98.6 °F (37 °C)] 98.6 °F (37 °C)  Heart Rate:  [69-99] 87  Resp:  [15-20] 16  BP: (128-136)/(60-71) 136/61    Physical Exam  Vitals reviewed.   Constitutional:       Appearance: He is well-developed. He is morbidly obese.      Comments: BMI 42.75   HENT:      Head: Normocephalic and atraumatic.   Eyes:      Pupils: Pupils are equal, round, and reactive to light.   Cardiovascular:      Rate and Rhythm: Normal rate and regular rhythm.      Heart sounds: Normal heart sounds. No murmur heard.  No friction rub. No gallop.    Pulmonary:      Effort: Pulmonary effort is normal. No respiratory distress.      Breath sounds: Normal breath sounds. No wheezing or rales.   Chest:      Chest wall: No tenderness.   Abdominal:      General: Bowel sounds are normal. There is no distension.      Palpations: Abdomen is soft.      Tenderness: There is no abdominal tenderness.   Musculoskeletal:      Cervical back: Normal range of motion and neck supple.       Right lower leg: Edema present.      Left lower leg: Edema present.   Psychiatric:         Behavior: Behavior normal.             Results Review:  I have reviewed the labs, radiology results, and diagnostic studies.    Laboratory Data:   Results from last 7 days   Lab Units 03/02/22  0517 03/01/22  0906 02/27/22  0512 02/26/22  1523 02/26/22  1523   SODIUM mmol/L 140 136 142   < > 142   POTASSIUM mmol/L 3.8 3.6 3.7   < > 3.6   CHLORIDE mmol/L 107 104 107   < > 107   CO2 mmol/L 25.0 24.0 25.0   < > 26.0   BUN mg/dL 9 11 11   < > 10   CREATININE mg/dL 0.77 0.87 0.87   < > 0.82   GLUCOSE mg/dL 97 112* 109*   < > 107*   CALCIUM mg/dL 8.8 8.5* 8.9   < > 9.2   BILIRUBIN mg/dL  --   --   --   --  0.5   ALK PHOS U/L  --   --   --   --  90   ALT (SGPT) U/L  --   --   --   --  33   AST (SGOT) U/L  --   --   --   --  24   ANION GAP mmol/L 8.0 8.0 10.0   < > 9.0    < > = values in this interval not displayed.     Estimated Creatinine Clearance: 183.4 mL/min (by C-G formula based on SCr of 0.77 mg/dL).          Results from last 7 days   Lab Units 03/02/22  0517 03/01/22  0906 02/27/22  0512 02/26/22  1523   WBC 10*3/mm3 3.81 4.27 5.11 6.57   HEMOGLOBIN g/dL 10.3* 10.6* 11.1* 12.0*   HEMATOCRIT % 31.8* 32.1* 34.4* 36.7*   PLATELETS 10*3/mm3 180 184 190 202           Culture Data:   No results found for: BLOODCX  No results found for: URINECX  No results found for: RESPCX  No results found for: WOUNDCX  No results found for: STOOLCX  No components found for: BODYFLD    Radiology Data:   Imaging Results (Last 24 Hours)     ** No results found for the last 24 hours. **          I have reviewed the patient's current medications.     Assessment/Plan     Active Hospital Problems    Diagnosis    • **Chronic cellulitis    • Cellulitis of right lower extremity    • Chronic venous stasis    • Lymphedema    • Morbid obesity (HCC)        Plan:  1.  Chronic lower extremity cellulitis: Continue IV antibiotics.  Improving some.  2.   Lymphedema with chronic venous stasis: Wound care following.  Appreciate their help.  3.  Morbid obesity: BMI 42.75.  4. Shortness of breath: Patient with some wheezes. Will start bronchodilators. Consider repeat chest x-ray.  5.  DVT prophylaxis: Heparin.          The patient was evaluated during the global COVID-19 pandemic, and the diagnosis was suspected/considered upon their initial presentation.  Evaluation, treatment, and testing were consistent with current guidelines for patients who present with complaints or symptoms that may be related to COVID-19.    I confirmed that the patient's Advance Care Plan is present, code status is documented, or surrogate decision maker is listed in the patient's medical record.       Discharge Planning: I expect patient to be discharged to home versus skilled facility in 3-4 days.        This document has been electronically signed by Yogi Keith MD on March 2, 2022 14:16 CST

## 2022-03-03 LAB
ANION GAP SERPL CALCULATED.3IONS-SCNC: 9 MMOL/L (ref 5–15)
BACTERIA SPEC AEROBE CULT: NORMAL
BACTERIA SPEC AEROBE CULT: NORMAL
BASOPHILS # BLD AUTO: 0.02 10*3/MM3 (ref 0–0.2)
BASOPHILS NFR BLD AUTO: 0.5 % (ref 0–1.5)
BUN SERPL-MCNC: 9 MG/DL (ref 6–20)
BUN/CREAT SERPL: 10.3 (ref 7–25)
CALCIUM SPEC-SCNC: 9.2 MG/DL (ref 8.6–10.5)
CHLORIDE SERPL-SCNC: 106 MMOL/L (ref 98–107)
CO2 SERPL-SCNC: 26 MMOL/L (ref 22–29)
CREAT SERPL-MCNC: 0.87 MG/DL (ref 0.76–1.27)
DEPRECATED RDW RBC AUTO: 49.1 FL (ref 37–54)
EGFRCR SERPLBLD CKD-EPI 2021: 105.1 ML/MIN/1.73
EOSINOPHIL # BLD AUTO: 0.3 10*3/MM3 (ref 0–0.4)
EOSINOPHIL NFR BLD AUTO: 8.1 % (ref 0.3–6.2)
ERYTHROCYTE [DISTWIDTH] IN BLOOD BY AUTOMATED COUNT: 16.1 % (ref 12.3–15.4)
GLUCOSE SERPL-MCNC: 97 MG/DL (ref 65–99)
HCT VFR BLD AUTO: 32.8 % (ref 37.5–51)
HGB BLD-MCNC: 10.7 G/DL (ref 13–17.7)
IMM GRANULOCYTES # BLD AUTO: 0.01 10*3/MM3 (ref 0–0.05)
IMM GRANULOCYTES NFR BLD AUTO: 0.3 % (ref 0–0.5)
LYMPHOCYTES # BLD AUTO: 1.39 10*3/MM3 (ref 0.7–3.1)
LYMPHOCYTES NFR BLD AUTO: 37.6 % (ref 19.6–45.3)
MCH RBC QN AUTO: 27.7 PG (ref 26.6–33)
MCHC RBC AUTO-ENTMCNC: 32.6 G/DL (ref 31.5–35.7)
MCV RBC AUTO: 85 FL (ref 79–97)
MONOCYTES # BLD AUTO: 0.42 10*3/MM3 (ref 0.1–0.9)
MONOCYTES NFR BLD AUTO: 11.4 % (ref 5–12)
NEUTROPHILS NFR BLD AUTO: 1.56 10*3/MM3 (ref 1.7–7)
NEUTROPHILS NFR BLD AUTO: 42.1 % (ref 42.7–76)
NRBC BLD AUTO-RTO: 0 /100 WBC (ref 0–0.2)
PLATELET # BLD AUTO: 190 10*3/MM3 (ref 140–450)
PMV BLD AUTO: 9.7 FL (ref 6–12)
POTASSIUM SERPL-SCNC: 3.9 MMOL/L (ref 3.5–5.2)
RBC # BLD AUTO: 3.86 10*6/MM3 (ref 4.14–5.8)
SODIUM SERPL-SCNC: 141 MMOL/L (ref 136–145)
WBC NRBC COR # BLD: 3.7 10*3/MM3 (ref 3.4–10.8)

## 2022-03-03 PROCEDURE — 94799 UNLISTED PULMONARY SVC/PX: CPT

## 2022-03-03 PROCEDURE — 85025 COMPLETE CBC W/AUTO DIFF WBC: CPT | Performed by: HOSPITALIST

## 2022-03-03 PROCEDURE — 94760 N-INVAS EAR/PLS OXIMETRY 1: CPT

## 2022-03-03 PROCEDURE — 25010000002 HEPARIN (PORCINE) PER 1000 UNITS: Performed by: INTERNAL MEDICINE

## 2022-03-03 PROCEDURE — 80048 BASIC METABOLIC PNL TOTAL CA: CPT | Performed by: HOSPITALIST

## 2022-03-03 RX ADMIN — OXYCODONE HYDROCHLORIDE AND ACETAMINOPHEN 1 TABLET: 10; 325 TABLET ORAL at 09:41

## 2022-03-03 RX ADMIN — IPRATROPIUM BROMIDE AND ALBUTEROL SULFATE 3 ML: 2.5; .5 SOLUTION RESPIRATORY (INHALATION) at 15:13

## 2022-03-03 RX ADMIN — OXYCODONE HYDROCHLORIDE AND ACETAMINOPHEN 1 TABLET: 10; 325 TABLET ORAL at 21:54

## 2022-03-03 RX ADMIN — Medication 10 ML: at 20:41

## 2022-03-03 RX ADMIN — HEPARIN SODIUM 5000 UNITS: 5000 INJECTION, SOLUTION INTRAVENOUS; SUBCUTANEOUS at 09:36

## 2022-03-03 RX ADMIN — Medication 1 APPLICATION: at 09:36

## 2022-03-03 RX ADMIN — IPRATROPIUM BROMIDE AND ALBUTEROL SULFATE 3 ML: 2.5; .5 SOLUTION RESPIRATORY (INHALATION) at 19:04

## 2022-03-03 RX ADMIN — LINEZOLID 600 MG: 600 TABLET, FILM COATED ORAL at 09:36

## 2022-03-03 RX ADMIN — LINEZOLID 600 MG: 600 TABLET, FILM COATED ORAL at 20:40

## 2022-03-03 RX ADMIN — PANTOPRAZOLE SODIUM 40 MG: 40 TABLET, DELAYED RELEASE ORAL at 05:19

## 2022-03-03 RX ADMIN — HEPARIN SODIUM 5000 UNITS: 5000 INJECTION, SOLUTION INTRAVENOUS; SUBCUTANEOUS at 20:40

## 2022-03-03 RX ADMIN — IPRATROPIUM BROMIDE AND ALBUTEROL SULFATE 3 ML: 2.5; .5 SOLUTION RESPIRATORY (INHALATION) at 06:52

## 2022-03-03 RX ADMIN — OXYCODONE HYDROCHLORIDE AND ACETAMINOPHEN 1 TABLET: 10; 325 TABLET ORAL at 17:56

## 2022-03-03 RX ADMIN — Medication 10 ML: at 09:36

## 2022-03-03 NOTE — PROGRESS NOTES
[facility] Medicine Services  INPATIENT PROGRESS NOTE    Length of Stay: 3  Date of Admission: 2/26/2022  Primary Care Physician: Ethan Casey MD    Subjective   Chief Complaint: Bilateral lower extremity redness and edema  HPI: Edema is improving.  Some proximal leg edema.    Review of Systems   Constitutional: Negative for appetite change, chills, fatigue and fever.   Respiratory: Negative for chest tightness and shortness of breath.    Cardiovascular: Positive for leg swelling. Negative for chest pain and palpitations.   Gastrointestinal: Negative for abdominal pain, constipation, diarrhea, nausea and vomiting.   Skin: Positive for color change and wound.   Neurological: Negative for dizziness, weakness, light-headedness, numbness and headaches.        All pertinent negatives and positives are as above. All other systems have been reviewed and are negative unless otherwise stated.     Objective    Temp:  [96 °F (35.6 °C)-98.2 °F (36.8 °C)] 98.2 °F (36.8 °C)  Heart Rate:  [61-84] 74  Resp:  [16-18] 18  BP: (116-138)/(56-75) 116/59    Physical Exam  Vitals reviewed.   Constitutional:       Appearance: He is well-developed. He is morbidly obese.      Comments: BMI 42.75   HENT:      Head: Normocephalic and atraumatic.   Eyes:      Pupils: Pupils are equal, round, and reactive to light.   Cardiovascular:      Rate and Rhythm: Normal rate and regular rhythm.      Heart sounds: Normal heart sounds. No murmur heard.  No friction rub. No gallop.    Pulmonary:      Effort: Pulmonary effort is normal. No respiratory distress.      Breath sounds: Normal breath sounds. No wheezing or rales.   Chest:      Chest wall: No tenderness.   Abdominal:      General: Bowel sounds are normal. There is no distension.      Palpations: Abdomen is soft.      Tenderness: There is no abdominal tenderness.   Musculoskeletal:      Cervical back: Normal range of motion and neck supple.       Right lower leg: Edema present.      Left lower leg: Edema present.   Psychiatric:         Behavior: Behavior normal.       Results Review:  I have reviewed the labs, radiology results, and diagnostic studies.    Laboratory Data:   Results from last 7 days   Lab Units 03/03/22  0558 03/02/22  0517 03/01/22  0906 02/27/22  0512 02/26/22  1523   SODIUM mmol/L 141 140 136   < > 142   POTASSIUM mmol/L 3.9 3.8 3.6   < > 3.6   CHLORIDE mmol/L 106 107 104   < > 107   CO2 mmol/L 26.0 25.0 24.0   < > 26.0   BUN mg/dL 9 9 11   < > 10   CREATININE mg/dL 0.87 0.77 0.87   < > 0.82   GLUCOSE mg/dL 97 97 112*   < > 107*   CALCIUM mg/dL 9.2 8.8 8.5*   < > 9.2   BILIRUBIN mg/dL  --   --   --   --  0.5   ALK PHOS U/L  --   --   --   --  90   ALT (SGPT) U/L  --   --   --   --  33   AST (SGOT) U/L  --   --   --   --  24   ANION GAP mmol/L 9.0 8.0 8.0   < > 9.0    < > = values in this interval not displayed.     Estimated Creatinine Clearance: 162.4 mL/min (by C-G formula based on SCr of 0.87 mg/dL).          Results from last 7 days   Lab Units 03/03/22  0558 03/02/22  0517 03/01/22  0906 02/27/22  0512 02/26/22  1523   WBC 10*3/mm3 3.70 3.81 4.27 5.11 6.57   HEMOGLOBIN g/dL 10.7* 10.3* 10.6* 11.1* 12.0*   HEMATOCRIT % 32.8* 31.8* 32.1* 34.4* 36.7*   PLATELETS 10*3/mm3 190 180 184 190 202           Culture Data:   No results found for: BLOODCX  No results found for: URINECX  No results found for: RESPCX  No results found for: WOUNDCX  No results found for: STOOLCX  No components found for: BODYFLD    Radiology Data:   Imaging Results (Last 24 Hours)     ** No results found for the last 24 hours. **          I have reviewed the patient's current medications.     Assessment/Plan     Active Hospital Problems    Diagnosis    • **Chronic cellulitis    • Cellulitis of right lower extremity    • Chronic venous stasis    • Lymphedema    • Morbid obesity (HCC)        Plan:  1.  Chronic lower extremity cellulitis: Continue IV antibiotics.   Slowly improving.  2.  Lymphedema with chronic venous stasis: Wound care following.  Appreciate their help.  3.  Morbid obesity: BMI 42.75.  4. Shortness of breath: Patient with some wheezes. Will start bronchodilators. Consider repeat chest x-ray.  5.  DVT prophylaxis: Heparin.    The patient was evaluated during the global COVID-19 pandemic, and the diagnosis was suspected/considered upon their initial presentation.  Evaluation, treatment, and testing were consistent with current guidelines for patients who present with complaints or symptoms that may be related to COVID-19.    I confirmed that the patient's Advance Care Plan is present, code status is documented, or surrogate decision maker is listed in the patient's medical record.       Discharge Planning: I expect patient to be discharged to home versus skilled facility in 3-4 days.        This document has been electronically signed by Yogi Keith MD on March 3, 2022 13:11 CST

## 2022-03-03 NOTE — PLAN OF CARE
Goal Outcome Evaluation:           Progress: no change  Outcome Summary: Pt rested fair this shift. Treated x2 for pain. Vitals stable. Dressings to bilateral lower extremities changed this shift. Will continue to monitor

## 2022-03-04 LAB
ANION GAP SERPL CALCULATED.3IONS-SCNC: 8 MMOL/L (ref 5–15)
BASOPHILS # BLD AUTO: 0.03 10*3/MM3 (ref 0–0.2)
BASOPHILS NFR BLD AUTO: 0.8 % (ref 0–1.5)
BUN SERPL-MCNC: 10 MG/DL (ref 6–20)
BUN/CREAT SERPL: 10.4 (ref 7–25)
CALCIUM SPEC-SCNC: 9 MG/DL (ref 8.6–10.5)
CHLORIDE SERPL-SCNC: 102 MMOL/L (ref 98–107)
CO2 SERPL-SCNC: 26 MMOL/L (ref 22–29)
CREAT SERPL-MCNC: 0.96 MG/DL (ref 0.76–1.27)
DEPRECATED RDW RBC AUTO: 50.5 FL (ref 37–54)
EGFRCR SERPLBLD CKD-EPI 2021: 96.3 ML/MIN/1.73
EOSINOPHIL # BLD AUTO: 0.34 10*3/MM3 (ref 0–0.4)
EOSINOPHIL NFR BLD AUTO: 8.8 % (ref 0.3–6.2)
ERYTHROCYTE [DISTWIDTH] IN BLOOD BY AUTOMATED COUNT: 15.9 % (ref 12.3–15.4)
GLUCOSE SERPL-MCNC: 93 MG/DL (ref 65–99)
HCT VFR BLD AUTO: 34.6 % (ref 37.5–51)
HGB BLD-MCNC: 11.1 G/DL (ref 13–17.7)
IMM GRANULOCYTES # BLD AUTO: 0 10*3/MM3 (ref 0–0.05)
IMM GRANULOCYTES NFR BLD AUTO: 0 % (ref 0–0.5)
LYMPHOCYTES # BLD AUTO: 1.37 10*3/MM3 (ref 0.7–3.1)
LYMPHOCYTES NFR BLD AUTO: 35.5 % (ref 19.6–45.3)
MCH RBC QN AUTO: 28.2 PG (ref 26.6–33)
MCHC RBC AUTO-ENTMCNC: 32.1 G/DL (ref 31.5–35.7)
MCV RBC AUTO: 87.8 FL (ref 79–97)
MONOCYTES # BLD AUTO: 0.42 10*3/MM3 (ref 0.1–0.9)
MONOCYTES NFR BLD AUTO: 10.9 % (ref 5–12)
NEUTROPHILS NFR BLD AUTO: 1.7 10*3/MM3 (ref 1.7–7)
NEUTROPHILS NFR BLD AUTO: 44 % (ref 42.7–76)
NRBC BLD AUTO-RTO: 0 /100 WBC (ref 0–0.2)
PLATELET # BLD AUTO: 213 10*3/MM3 (ref 140–450)
PMV BLD AUTO: 10 FL (ref 6–12)
POTASSIUM SERPL-SCNC: 4.1 MMOL/L (ref 3.5–5.2)
RBC # BLD AUTO: 3.94 10*6/MM3 (ref 4.14–5.8)
SODIUM SERPL-SCNC: 136 MMOL/L (ref 136–145)
WBC NRBC COR # BLD: 3.86 10*3/MM3 (ref 3.4–10.8)

## 2022-03-04 PROCEDURE — 85025 COMPLETE CBC W/AUTO DIFF WBC: CPT | Performed by: HOSPITALIST

## 2022-03-04 PROCEDURE — 25010000002 FUROSEMIDE PER 20 MG: Performed by: HOSPITALIST

## 2022-03-04 PROCEDURE — 94760 N-INVAS EAR/PLS OXIMETRY 1: CPT

## 2022-03-04 PROCEDURE — 94799 UNLISTED PULMONARY SVC/PX: CPT

## 2022-03-04 PROCEDURE — 80048 BASIC METABOLIC PNL TOTAL CA: CPT | Performed by: HOSPITALIST

## 2022-03-04 PROCEDURE — 94664 DEMO&/EVAL PT USE INHALER: CPT

## 2022-03-04 PROCEDURE — 25010000002 HEPARIN (PORCINE) PER 1000 UNITS: Performed by: INTERNAL MEDICINE

## 2022-03-04 RX ORDER — FUROSEMIDE 10 MG/ML
40 INJECTION INTRAMUSCULAR; INTRAVENOUS DAILY
Status: DISCONTINUED | OUTPATIENT
Start: 2022-03-04 | End: 2022-03-05

## 2022-03-04 RX ORDER — DIAPER,BRIEF,INFANT-TODD,DISP
1 EACH MISCELLANEOUS EVERY 12 HOURS SCHEDULED
Status: DISCONTINUED | OUTPATIENT
Start: 2022-03-04 | End: 2022-03-07 | Stop reason: HOSPADM

## 2022-03-04 RX ADMIN — IPRATROPIUM BROMIDE AND ALBUTEROL SULFATE 3 ML: 2.5; .5 SOLUTION RESPIRATORY (INHALATION) at 06:47

## 2022-03-04 RX ADMIN — HEPARIN SODIUM 5000 UNITS: 5000 INJECTION, SOLUTION INTRAVENOUS; SUBCUTANEOUS at 20:10

## 2022-03-04 RX ADMIN — IPRATROPIUM BROMIDE AND ALBUTEROL SULFATE 3 ML: 2.5; .5 SOLUTION RESPIRATORY (INHALATION) at 14:32

## 2022-03-04 RX ADMIN — LINEZOLID 600 MG: 600 TABLET, FILM COATED ORAL at 09:46

## 2022-03-04 RX ADMIN — LINEZOLID 600 MG: 600 TABLET, FILM COATED ORAL at 20:10

## 2022-03-04 RX ADMIN — Medication 10 ML: at 09:46

## 2022-03-04 RX ADMIN — OXYCODONE HYDROCHLORIDE AND ACETAMINOPHEN 1 TABLET: 10; 325 TABLET ORAL at 11:53

## 2022-03-04 RX ADMIN — OXYCODONE HYDROCHLORIDE AND ACETAMINOPHEN 1 TABLET: 10; 325 TABLET ORAL at 05:12

## 2022-03-04 RX ADMIN — PANTOPRAZOLE SODIUM 40 MG: 40 TABLET, DELAYED RELEASE ORAL at 05:12

## 2022-03-04 RX ADMIN — Medication 10 ML: at 20:10

## 2022-03-04 RX ADMIN — OXYCODONE HYDROCHLORIDE AND ACETAMINOPHEN 1 TABLET: 10; 325 TABLET ORAL at 20:27

## 2022-03-04 RX ADMIN — HEPARIN SODIUM 5000 UNITS: 5000 INJECTION, SOLUTION INTRAVENOUS; SUBCUTANEOUS at 09:45

## 2022-03-04 RX ADMIN — FUROSEMIDE 40 MG: 10 INJECTION, SOLUTION INTRAVENOUS at 13:53

## 2022-03-04 RX ADMIN — Medication 1 APPLICATION: at 18:06

## 2022-03-04 RX ADMIN — IPRATROPIUM BROMIDE AND ALBUTEROL SULFATE 3 ML: 2.5; .5 SOLUTION RESPIRATORY (INHALATION) at 19:54

## 2022-03-04 NOTE — PROGRESS NOTES
[facility] Medicine Services  INPATIENT PROGRESS NOTE    Length of Stay: 4  Date of Admission: 2/26/2022  Primary Care Physician: Ethan Casey MD    Subjective   Chief Complaint: Bilateral lower extremity redness and edema  HPI: Distal lower extremity edema improving, but feels more edematous in his proximal lower extremities.    Review of Systems   Constitutional: Negative for appetite change, chills, fatigue and fever.   Respiratory: Negative for chest tightness and shortness of breath.    Cardiovascular: Positive for leg swelling. Negative for chest pain and palpitations.   Gastrointestinal: Negative for abdominal pain, constipation, diarrhea, nausea and vomiting.   Skin: Positive for color change and wound.   Neurological: Negative for dizziness, weakness, light-headedness, numbness and headaches.        All pertinent negatives and positives are as above. All other systems have been reviewed and are negative unless otherwise stated.     Objective    Temp:  [96.1 °F (35.6 °C)-99 °F (37.2 °C)] 96.1 °F (35.6 °C)  Heart Rate:  [64-81] 79  Resp:  [18] 18  BP: (117-140)/(56-73) 117/56    Physical Exam  Vitals reviewed.   Constitutional:       Appearance: He is well-developed. He is morbidly obese.      Comments: BMI 42.75   HENT:      Head: Normocephalic and atraumatic.   Eyes:      Pupils: Pupils are equal, round, and reactive to light.   Cardiovascular:      Rate and Rhythm: Normal rate and regular rhythm.      Heart sounds: Normal heart sounds. No murmur heard.  No friction rub. No gallop.    Pulmonary:      Effort: Pulmonary effort is normal. No respiratory distress.      Breath sounds: Normal breath sounds. No wheezing or rales.   Chest:      Chest wall: No tenderness.   Abdominal:      General: Bowel sounds are normal. There is no distension.      Palpations: Abdomen is soft.      Tenderness: There is no abdominal tenderness.   Musculoskeletal:       Cervical back: Normal range of motion and neck supple.      Right lower leg: Edema present.      Left lower leg: Edema present.   Psychiatric:         Behavior: Behavior normal.       Results Review:  I have reviewed the labs, radiology results, and diagnostic studies.    Laboratory Data:   Results from last 7 days   Lab Units 03/04/22 0455 03/03/22 0558 03/02/22  0517 02/27/22  0512 02/26/22  1523   SODIUM mmol/L 136 141 140   < > 142   POTASSIUM mmol/L 4.1 3.9 3.8   < > 3.6   CHLORIDE mmol/L 102 106 107   < > 107   CO2 mmol/L 26.0 26.0 25.0   < > 26.0   BUN mg/dL 10 9 9   < > 10   CREATININE mg/dL 0.96 0.87 0.77   < > 0.82   GLUCOSE mg/dL 93 97 97   < > 107*   CALCIUM mg/dL 9.0 9.2 8.8   < > 9.2   BILIRUBIN mg/dL  --   --   --   --  0.5   ALK PHOS U/L  --   --   --   --  90   ALT (SGPT) U/L  --   --   --   --  33   AST (SGOT) U/L  --   --   --   --  24   ANION GAP mmol/L 8.0 9.0 8.0   < > 9.0    < > = values in this interval not displayed.     Estimated Creatinine Clearance: 147.1 mL/min (by C-G formula based on SCr of 0.96 mg/dL).          Results from last 7 days   Lab Units 03/04/22 0455 03/03/22 0558 03/02/22 0517 03/01/22  0906 02/27/22  0512   WBC 10*3/mm3 3.86 3.70 3.81 4.27 5.11   HEMOGLOBIN g/dL 11.1* 10.7* 10.3* 10.6* 11.1*   HEMATOCRIT % 34.6* 32.8* 31.8* 32.1* 34.4*   PLATELETS 10*3/mm3 213 190 180 184 190           Culture Data:   No results found for: BLOODCX  No results found for: URINECX  No results found for: RESPCX  No results found for: WOUNDCX  No results found for: STOOLCX  No components found for: BODYFLD    Radiology Data:   Imaging Results (Last 24 Hours)     ** No results found for the last 24 hours. **          I have reviewed the patient's current medications.     Assessment/Plan     Active Hospital Problems    Diagnosis    • **Chronic cellulitis    • Cellulitis of right lower extremity    • Chronic venous stasis    • Lymphedema    • Morbid obesity (HCC)        Plan:  1.  Chronic  lower extremity cellulitis: Continue IV antibiotics.  Slowly improving.  2.  Lymphedema with chronic venous stasis: Wound care following.  Appreciate their help.  Will diurese.  3.  Morbid obesity: BMI 42.75.  4. Shortness of breath: Patient with some wheezes. Will start bronchodilators. Consider repeat chest x-ray.  5.  DVT prophylaxis: Heparin.    The patient was evaluated during the global COVID-19 pandemic, and the diagnosis was suspected/considered upon their initial presentation.  Evaluation, treatment, and testing were consistent with current guidelines for patients who present with complaints or symptoms that may be related to COVID-19.    I confirmed that the patient's Advance Care Plan is present, code status is documented, or surrogate decision maker is listed in the patient's medical record.       Discharge Planning: I expect patient to be discharged to home versus skilled facility in 3-4 days.        This document has been electronically signed by Yogi Keith MD on March 4, 2022 12:17 CST

## 2022-03-04 NOTE — PLAN OF CARE
Goal Outcome Evaluation:  Plan of Care Reviewed With: caregiver, patient        Progress: no change  Outcome Summary: Pt visited for LOS.  Eating well.  Wound care continues for Chronic Lower Ext Cellulitis.  RD provided education on wt loss; healthy eating, and low sodium. Will monitor

## 2022-03-04 NOTE — PLAN OF CARE
Goal Outcome Evaluation:           Progress: improving  Outcome Summary: Pt has rested well this shift. Treated x1 for pain. Vitals stable. Will continue to monitor

## 2022-03-04 NOTE — PROGRESS NOTES
Adult Nutrition  Assessment    Patient Name:  Jalil Camarillo  YOB: 1970  MRN: [id number]  Admit Date:  2/26/2022    Assessment Date:  3/4/2022    Comments:  Pt visited for LOS.  He is being managed for Chronic Lower Ext Cellulitis, on IV Abx.  He has a hx of Lymphedema with venous stasis--Wound care continues.  He is eating well with a good appetite.  He presents at 180% of his IBW with a BMI of 42.75, compatible with Morbid Obesity.  Rd provided education on Healthy eating and lifestyle changes for a Healthy weight and Low sodium eating plan.  Diet copies and contact number provided. Will monitor POC      Reason for Assessment     Row Name 03/04/22 1400          Reason for Assessment    Reason For Assessment per organizational policy     Diagnosis other (see comments)  cellulitis     Identified At Risk by Screening Criteria other (see comments)  LOS                Nutrition/Diet History     Row Name 03/04/22 140          Nutrition/Diet History    Typical Food/Fluid Intake Pt reports a good appetite.  NO Gi distress.  States that meals are going well.  Tries to eat healthy but doesn't always do it.                  Labs/Tests/Procedures/Meds     Row Name 03/04/22 1401          Labs/Procedures/Meds    Lab Results Reviewed reviewed, pertinent            Diagnostic Tests/Procedures    Diagnostic Test/Procedure Reviewed reviewed, pertinent            Medications    Pertinent Medications Reviewed reviewed, pertinent     Pertinent Medications Comments Lasix; Zyvox                Physical Findings     Row Name 03/04/22 1405          Physical Findings    Overall Physical Appearance obese; on oxygen therapy     Skin non-healing wound(s)                Estimated/Assessed Needs     Row Name 03/04/22 7210          Calculation Measurements    Weight Used For Calculations 88.9 kg (196 lb)            Estimated/Assessed Needs    Additional Documentation Additional Requirements (Group); Fluid Requirements  (Group); Bannock-St. Jeor Equation (Group); Protein Requirements (Group); Calorie Requirements (Group); KCAL/KG (Group)            Calorie Requirements    Estimated Calorie Need Method kcal/kg            KCAL/KG    KCAL/KG 25 Kcal/Kg (kcal)     25 Kcal/Kg (kcal) 2222.625            Bannock-St. Jeor Equation    RMR (Bannock-St. Jeor Equation) 1834.675            Protein Requirements    Weight Used For Protein Calculations 88.9 kg (196 lb)     Est Protein Requirement Amount (gms/kg) 1.1 gm protein     Estimated Protein Requirements (gms/day) 97.8            Fluid Requirements    Fluid Requirements (mL/day) 1500     Estimated Fluid Requirement Method --  fluid restriction per MD     RDA Method (mL) 1500                Nutrition Prescription Ordered     Row Name 03/04/22 1405          Nutrition Prescription PO    Current PO Diet Regular     Fluid Consistency Thin     Common Modifiers Fluid Restriction     Fluid Restriction mL per Tray 250 mL     Fluid Restriction mL per Day 1500 mL                Evaluation of Received Nutrient/Fluid Intake     Row Name 03/04/22 1405          PO Evaluation    Number of Meals 6     % PO Intake 100%                     Electronically signed by:  Pat Montoya RD  03/04/22 14:12 CST

## 2022-03-04 NOTE — PAYOR COMM NOTE
"    Stephany Couch RN [facility]  [phone]      Phone  [phone]      Fax  Cont.   Stay Review    Jalil Camarillo (51 y.o. Male)             Date of Birth Social Security Number Address Home Phone MRN    1970  [address] [place] [address] [phone] [id number]    [employer]ab[employer] Marital Status             [facility] Single       Admission Date Admission Type Admitting Provider Attending Provider Department, Room/Bed    2/26/22 Emergency Yogi Keith MD Popescu, Tudor, MD [facility] 3 EAST, 353/1    Discharge Date Discharge Disposition Discharge Destination                         Attending Provider: Claude Morris MD    Allergies: No Known Allergies    Isolation: None   Infection: COVID (History) (02/28/22)   Code Status: CPR   Advance Care Planning Activity    Ht: 190.5 cm (75\")   Wt: 155 kg (342 lb)    Admission Cmt: None   Principal Problem: Chronic cellulitis [L03.90]                 Active Insurance as of 2/26/2022     Primary Coverage     Payor Plan Insurance Group Employer/Plan Group    [facility] [employer] [id number]     Payor Plan Address Payor Plan Phone Number Payor Plan Fax Number Effective Dates    [address]   1/1/2021 - None Entered    [facility] [address]       Subscriber Name Subscriber Birth Date Member ID       JALIL CAMARILLO 1970 [id number]                 Emergency Contacts      (Rel.) Home Phone Work Phone Mobile Phone    Alex Camarillo (Brother) [phone] -- --            Vital Signs (last day)     Date/Time Temp Temp src Pulse Resp BP Patient Position SpO2    03/04/22 0736 96.1 (35.6) Axillary 79 18 117/56 Sitting 93    03/04/22 0657 -- -- 70 18 -- -- 99    03/04/22 0647 -- -- 67 18 -- -- 95    03/04/22 0340 98.2 (36.8) Temporal 65 18 140/73 Lying 94    03/04/22 0049 -- -- 64 -- -- -- --    03/03/22 1925 99 (37.2) Temporal 76 18 133/61 Sitting 94    " 03/03/22 1904 -- -- 76 18 -- -- 95    03/03/22 1603 98.9 (37.2) Tympanic 81 18 137/62 Sitting 94    03/03/22 1521 -- -- 72 18 -- -- 100    03/03/22 1514 -- -- 71 -- -- -- --    03/03/22 1513 -- -- 71 18 -- -- 98    03/03/22 1004 -- -- 74 -- -- -- --    03/03/22 0753 98.2 (36.8) Tympanic 75 18 116/59 Sitting 97    03/03/22 0659 -- -- 65 18 -- -- 100    03/03/22 0652 -- -- 61 18 -- -- 97    03/03/22 0516 96 (35.6) Oral 64 18 138/75 Lying 97    03/03/22 0050 -- -- 72 -- -- -- --          Oxygen Therapy (last day)     Date/Time SpO2 Device (Oxygen Therapy) Flow (L/min) Oxygen Concentration (%) ETCO2 (mmHg)    03/04/22 0736 93 room air -- -- --    03/04/22 0657 99 room air -- -- --    03/04/22 0647 95 room air -- -- --    03/04/22 0340 94 room air -- -- --    03/03/22 1925 94 room air -- -- --    03/03/22 1904 95 room air -- -- --    03/03/22 1603 94 room air -- -- --    03/03/22 1521 100 room air -- -- --    03/03/22 1513 98 room air -- -- --    03/03/22 1004 -- room air -- -- --    03/03/22 0753 97 room air -- -- --    03/03/22 0659 100 room air -- -- --    03/03/22 0652 97 room air -- -- --    03/03/22 0516 97 room air -- -- --          Current Facility-Administered Medications   Medication Dose Route Frequency Provider Last Rate Last Admin   • albuterol (PROVENTIL) nebulizer solution 0.083% 2.5 mg/3mL  2.5 mg Nebulization Q4H PRN Claude Morris MD       • Bag [place] ointment ointment 1 application  1 application Topical Daily Claude Morris MD   1 application at 03/03/22 0936   • heparin (porcine) 5000 UNIT/ML injection 5,000 Units  5,000 Units Subcutaneous Q12H Claude Morris MD   5,000 Units at 03/04/22 0945   • ipratropium-albuterol (DUO-NEB) nebulizer solution 3 mL  3 mL Nebulization TID - RT Claude Morris MD   3 mL at 03/04/22 0647   • linezolid (ZYVOX) tablet 600 mg  600 mg Oral Q12H Yogi Keith MD   600 mg at 03/04/22 0946   • oxyCODONE-acetaminophen (PERCOCET)  MG per tablet 1 tablet  1 tablet  Oral Q4H PRN Claude Morris MD   1 tablet at 03/04/22 0512   • pantoprazole (PROTONIX) EC tablet 40 mg  40 mg Oral Q AM Claude Morris MD   40 mg at 03/04/22 0512   • sodium chloride 0.9 % flush 10 mL  10 mL Intravenous PRN Claude Morris MD       • sodium chloride 0.9 % flush 10 mL  10 mL Intravenous Q12H Claude Morris MD   10 mL at 03/04/22 0946   • sodium chloride 0.9 % flush 10 mL  10 mL Intravenous PRN Claude Morris MD            Physician Progress Notes (last 24 hours)      Yogi Keith MD at 03/03/22 1311              [facility] Medicine Services  INPATIENT PROGRESS NOTE    Length of Stay: 3  Date of Admission: 2/26/2022  Primary Care Physician: Ethan Casey MD    Subjective   Chief Complaint: Bilateral lower extremity redness and edema  HPI: Edema is improving.  Some proximal leg edema.    Review of Systems   Constitutional: Negative for appetite change, chills, fatigue and fever.   Respiratory: Negative for chest tightness and shortness of breath.    Cardiovascular: Positive for leg swelling. Negative for chest pain and palpitations.   Gastrointestinal: Negative for abdominal pain, constipation, diarrhea, nausea and vomiting.   Skin: Positive for color change and wound.   Neurological: Negative for dizziness, weakness, light-headedness, numbness and headaches.        All pertinent negatives and positives are as above. All other systems have been reviewed and are negative unless otherwise stated.     Objective    Temp:  [96 °F (35.6 °C)-98.2 °F (36.8 °C)] 98.2 °F (36.8 °C)  Heart Rate:  [61-84] 74  Resp:  [16-18] 18  BP: (116-138)/(56-75) 116/59    Physical Exam  Vitals reviewed.   Constitutional:       Appearance: He is well-developed. He is morbidly obese.      Comments: BMI 42.75   HENT:      Head: Normocephalic and atraumatic.   Eyes:      Pupils: Pupils are equal, round, and reactive to light.   Cardiovascular:      Rate and Rhythm:  Normal rate and regular rhythm.      Heart sounds: Normal heart sounds. No murmur heard.  No friction rub. No gallop.    Pulmonary:      Effort: Pulmonary effort is normal. No respiratory distress.      Breath sounds: Normal breath sounds. No wheezing or rales.   Chest:      Chest wall: No tenderness.   Abdominal:      General: Bowel sounds are normal. There is no distension.      Palpations: Abdomen is soft.      Tenderness: There is no abdominal tenderness.   Musculoskeletal:      Cervical back: Normal range of motion and neck supple.      Right lower leg: Edema present.      Left lower leg: Edema present.   Psychiatric:         Behavior: Behavior normal.       Results Review:  I have reviewed the labs, radiology results, and diagnostic studies.    Laboratory Data:   Results from last 7 days   Lab Units 03/03/22  0558 03/02/22  0517 03/01/22 0906 02/27/22 0512 02/26/22  1523   SODIUM mmol/L 141 140 136   < > 142   POTASSIUM mmol/L 3.9 3.8 3.6   < > 3.6   CHLORIDE mmol/L 106 107 104   < > 107   CO2 mmol/L 26.0 25.0 24.0   < > 26.0   BUN mg/dL 9 9 11   < > 10   CREATININE mg/dL 0.87 0.77 0.87   < > 0.82   GLUCOSE mg/dL 97 97 112*   < > 107*   CALCIUM mg/dL 9.2 8.8 8.5*   < > 9.2   BILIRUBIN mg/dL  --   --   --   --  0.5   ALK PHOS U/L  --   --   --   --  90   ALT (SGPT) U/L  --   --   --   --  33   AST (SGOT) U/L  --   --   --   --  24   ANION GAP mmol/L 9.0 8.0 8.0   < > 9.0    < > = values in this interval not displayed.     Estimated Creatinine Clearance: 162.4 mL/min (by C-G formula based on SCr of 0.87 mg/dL).          Results from last 7 days   Lab Units 03/03/22 0558 03/02/22 0517 03/01/22 0906 02/27/22 0512 02/26/22  1523   WBC 10*3/mm3 3.70 3.81 4.27 5.11 6.57   HEMOGLOBIN g/dL 10.7* 10.3* 10.6* 11.1* 12.0*   HEMATOCRIT % 32.8* 31.8* 32.1* 34.4* 36.7*   PLATELETS 10*3/mm3 190 180 184 190 202           Culture Data:   No results found for: BLOODCX  No results found for: URINECX  No results found for:  RESPCX  No results found for: WOUNDCX  No results found for: STOOLCX  No components found for: BODYFLD    Radiology Data:   Imaging Results (Last 24 Hours)     ** No results found for the last 24 hours. **          I have reviewed the patient's current medications.     Assessment/Plan     Active Hospital Problems    Diagnosis    • **Chronic cellulitis    • Cellulitis of right lower extremity    • Chronic venous stasis    • Lymphedema    • Morbid obesity (HCC)        Plan:  1.  Chronic lower extremity cellulitis: Continue IV antibiotics.  Slowly improving.  2.  Lymphedema with chronic venous stasis: Wound care following.  Appreciate their help.  3.  Morbid obesity: BMI 42.75.  4. Shortness of breath: Patient with some wheezes. Will start bronchodilators. Consider repeat chest x-ray.  5.  DVT prophylaxis: Heparin.    The patient was evaluated during the global COVID-19 pandemic, and the diagnosis was suspected/considered upon their initial presentation.  Evaluation, treatment, and testing were consistent with current guidelines for patients who present with complaints or symptoms that may be related to COVID-19.    I confirmed that the patient's Advance Care Plan is present, code status is documented, or surrogate decision maker is listed in the patient's medical record.       Discharge Planning: I expect patient to be discharged to home versus skilled facility in 3-4 days.        This document has been electronically signed by Yogi Keith MD on March 3, 2022 13:11 CST        Electronically signed by Yogi Keith MD at 03/03/22 1321       Medical Student Notes (last 24 hours)  Notes from 03/03/22 1034 through 03/04/22 1034   No notes of this type exist for this encounter.         Consult Notes (last 24 hours)  Notes from 03/03/22 1034 through 03/04/22 1034   No notes of this type exist for this encounter.

## 2022-03-05 ENCOUNTER — APPOINTMENT (OUTPATIENT)
Dept: GENERAL RADIOLOGY | Facility: HOSPITAL | Age: 52
End: 2022-03-05

## 2022-03-05 LAB
ANION GAP SERPL CALCULATED.3IONS-SCNC: 10 MMOL/L (ref 5–15)
BASOPHILS # BLD AUTO: 0.01 10*3/MM3 (ref 0–0.2)
BASOPHILS NFR BLD AUTO: 0.3 % (ref 0–1.5)
BUN SERPL-MCNC: 12 MG/DL (ref 6–20)
BUN/CREAT SERPL: 10.9 (ref 7–25)
CALCIUM SPEC-SCNC: 9 MG/DL (ref 8.6–10.5)
CHLORIDE SERPL-SCNC: 102 MMOL/L (ref 98–107)
CO2 SERPL-SCNC: 27 MMOL/L (ref 22–29)
CREAT SERPL-MCNC: 1.1 MG/DL (ref 0.76–1.27)
DEPRECATED RDW RBC AUTO: 49.7 FL (ref 37–54)
EGFRCR SERPLBLD CKD-EPI 2021: 81.8 ML/MIN/1.73
EOSINOPHIL # BLD AUTO: 0.28 10*3/MM3 (ref 0–0.4)
EOSINOPHIL NFR BLD AUTO: 7.7 % (ref 0.3–6.2)
ERYTHROCYTE [DISTWIDTH] IN BLOOD BY AUTOMATED COUNT: 16 % (ref 12.3–15.4)
GLUCOSE SERPL-MCNC: 92 MG/DL (ref 65–99)
HCT VFR BLD AUTO: 32.9 % (ref 37.5–51)
HGB BLD-MCNC: 10.7 G/DL (ref 13–17.7)
IMM GRANULOCYTES # BLD AUTO: 0.01 10*3/MM3 (ref 0–0.05)
IMM GRANULOCYTES NFR BLD AUTO: 0.3 % (ref 0–0.5)
LYMPHOCYTES # BLD AUTO: 1.34 10*3/MM3 (ref 0.7–3.1)
LYMPHOCYTES NFR BLD AUTO: 37 % (ref 19.6–45.3)
MCH RBC QN AUTO: 27.7 PG (ref 26.6–33)
MCHC RBC AUTO-ENTMCNC: 32.5 G/DL (ref 31.5–35.7)
MCV RBC AUTO: 85.2 FL (ref 79–97)
MONOCYTES # BLD AUTO: 0.36 10*3/MM3 (ref 0.1–0.9)
MONOCYTES NFR BLD AUTO: 9.9 % (ref 5–12)
NEUTROPHILS NFR BLD AUTO: 1.62 10*3/MM3 (ref 1.7–7)
NEUTROPHILS NFR BLD AUTO: 44.8 % (ref 42.7–76)
NRBC BLD AUTO-RTO: 0 /100 WBC (ref 0–0.2)
PLATELET # BLD AUTO: 210 10*3/MM3 (ref 140–450)
PMV BLD AUTO: 9.7 FL (ref 6–12)
POTASSIUM SERPL-SCNC: 3.8 MMOL/L (ref 3.5–5.2)
RBC # BLD AUTO: 3.86 10*6/MM3 (ref 4.14–5.8)
SODIUM SERPL-SCNC: 139 MMOL/L (ref 136–145)
WBC NRBC COR # BLD: 3.62 10*3/MM3 (ref 3.4–10.8)

## 2022-03-05 PROCEDURE — 25010000002 HEPARIN (PORCINE) PER 1000 UNITS: Performed by: INTERNAL MEDICINE

## 2022-03-05 PROCEDURE — 80048 BASIC METABOLIC PNL TOTAL CA: CPT | Performed by: HOSPITALIST

## 2022-03-05 PROCEDURE — 94799 UNLISTED PULMONARY SVC/PX: CPT

## 2022-03-05 PROCEDURE — 71045 X-RAY EXAM CHEST 1 VIEW: CPT

## 2022-03-05 PROCEDURE — 25010000002 FUROSEMIDE PER 20 MG: Performed by: HOSPITALIST

## 2022-03-05 PROCEDURE — 25010000002 METHYLPREDNISOLONE PER 125 MG: Performed by: HOSPITALIST

## 2022-03-05 PROCEDURE — 85025 COMPLETE CBC W/AUTO DIFF WBC: CPT | Performed by: HOSPITALIST

## 2022-03-05 PROCEDURE — 94760 N-INVAS EAR/PLS OXIMETRY 1: CPT

## 2022-03-05 RX ORDER — METHYLPREDNISOLONE SODIUM SUCCINATE 125 MG/2ML
125 INJECTION, POWDER, LYOPHILIZED, FOR SOLUTION INTRAMUSCULAR; INTRAVENOUS ONCE
Status: COMPLETED | OUTPATIENT
Start: 2022-03-05 | End: 2022-03-05

## 2022-03-05 RX ORDER — ALBUTEROL SULFATE 2.5 MG/3ML
2.5 SOLUTION RESPIRATORY (INHALATION) EVERY 4 HOURS PRN
Status: DISCONTINUED | OUTPATIENT
Start: 2022-03-05 | End: 2022-03-07 | Stop reason: HOSPADM

## 2022-03-05 RX ADMIN — Medication 10 ML: at 10:27

## 2022-03-05 RX ADMIN — OXYCODONE HYDROCHLORIDE AND ACETAMINOPHEN 1 TABLET: 10; 325 TABLET ORAL at 12:43

## 2022-03-05 RX ADMIN — HEPARIN SODIUM 5000 UNITS: 5000 INJECTION, SOLUTION INTRAVENOUS; SUBCUTANEOUS at 10:26

## 2022-03-05 RX ADMIN — PANTOPRAZOLE SODIUM 40 MG: 40 TABLET, DELAYED RELEASE ORAL at 05:19

## 2022-03-05 RX ADMIN — Medication 10 ML: at 20:10

## 2022-03-05 RX ADMIN — ALBUTEROL SULFATE 2.5 MG: 2.5 SOLUTION RESPIRATORY (INHALATION) at 18:34

## 2022-03-05 RX ADMIN — ALBUTEROL SULFATE 2.5 MG: 2.5 SOLUTION RESPIRATORY (INHALATION) at 08:43

## 2022-03-05 RX ADMIN — LINEZOLID 600 MG: 600 TABLET, FILM COATED ORAL at 10:27

## 2022-03-05 RX ADMIN — LINEZOLID 600 MG: 600 TABLET, FILM COATED ORAL at 20:10

## 2022-03-05 RX ADMIN — HEPARIN SODIUM 5000 UNITS: 5000 INJECTION, SOLUTION INTRAVENOUS; SUBCUTANEOUS at 20:10

## 2022-03-05 RX ADMIN — METHYLPREDNISOLONE SODIUM SUCCINATE 125 MG: 125 INJECTION, POWDER, FOR SOLUTION INTRAMUSCULAR; INTRAVENOUS at 18:25

## 2022-03-05 RX ADMIN — OXYCODONE HYDROCHLORIDE AND ACETAMINOPHEN 1 TABLET: 10; 325 TABLET ORAL at 20:10

## 2022-03-05 RX ADMIN — FUROSEMIDE 40 MG: 10 INJECTION, SOLUTION INTRAVENOUS at 10:26

## 2022-03-05 NOTE — NURSING NOTE
Pt called me to room stating he was having trouble breathing. Audible wheezing. o2 sat was 95%. Called respiratory to give neb treatment and paged Dr. Keith.

## 2022-03-05 NOTE — NURSING NOTE
Dr. Keith on floor and notified of soa earlier but had resolved after neb treatment. He ordered a portable chest xray and pt/ot. OK to give AM meds.

## 2022-03-05 NOTE — PLAN OF CARE
Goal Outcome Evaluation:         Pt pleasant and cooperative. C/o pain to his legs. Prn pain medication given. V/s are stable. No s/s of distress noted or observed at this time.

## 2022-03-05 NOTE — PLAN OF CARE
Item 0 Points 1 Point 2 Points 3 Points 4 Points Subtotal   Mental Status Alert, oriented, cooperative Lethargic, follows commands Confused, not following commands Obtunded or Somnolent Comatose 0   Respiratory Pattern Regular RR 8-16 breaths/minute Increased RR 18-25 breaths/minute Dyspnea on exertion, irregular RR 26-30 breaths/minute Shortness of breath,  RR 31-35 breaths/minute Accessory muscle use, severe SOB  RR > 35 breaths/minute 1   Breath Sounds Clear Decreased unilaterally Decreased bilaterally Basilar crackles Wheezing and/or rhonchi 2   Cough Strong, spontaneous, non-productive Strong productive Weak, non-productive Weak, productive or weak with rhonchi Absent or may require suctioning 0   Pulmonary Status Nonsmoker, no previous history, >1 year quit < 1 PPD  <1 year quit > or = 1 PPD Diagnosed pulmonary disease (severe or chronic) Severe or chronic pulmonary disease with exacerbation 0   Surgical Status None General surgery (non-abdominal or non-thoracic) Lower abdominal Thoracic or upper abdominal Thoracic with pulmonary disease 0     Chest X-ray Clear Chronic changes Infiltrates, atelectasis or pleural effusion Infiltrates in > 1 lobe Diffuse infiltrates and atelectasis and/or effusions 0   Activity   Level Ambulatory Ambulatory with assistance Non-ambulatory Paraplegic Quadriplegic 0        Total Score   3   Score    Drug Therapy Frequency 20 or >    Q4 Duoneb with Q2 Albuterol PRN 15-19    Q6 Duoneb with Q4 Albuterol PRN 10-14    QID Duoneb with Q4 Albuterol PRN 5-9    TID Duoneb with Q6 Albuterol PRN 0-4    Q4 PRN Duoneb                  Lung Expansion Therapy (PEP) Bronchopulmonary Hygiene (CPT)   Q4 & PRN - Severe atelectasis, poor oxygenation Q4 - Copious secretions, dyspnea, unable to sleep   QID - High risk for persistent atelectasis, existence of atelectasis QID & Q4 PRN - Moderate secretion production   TID - At risk for developing atelectasis TID - Small amounts of secretions with poor  cough   BID - Prevention of atelectasis BID - Unable to breathe deeply and cough spontaneously     RT Comments / Recommendations:            Goal Outcome Evaluation:

## 2022-03-05 NOTE — PROGRESS NOTES
[facility] Medicine Services  INPATIENT PROGRESS NOTE    Length of Stay: 5  Date of Admission: 2/26/2022  Primary Care Physician: Ethan Casey MD    Subjective   Chief Complaint: Bilateral lower extremity redness and edema  HPI: Patient states that his feet are feeling better and easier to move.  He still feels like he has swelling in his proximal lower extremities.    Review of Systems   Constitutional: Negative for appetite change, chills, fatigue and fever.   Respiratory: Negative for chest tightness and shortness of breath.    Cardiovascular: Positive for leg swelling. Negative for chest pain and palpitations.   Gastrointestinal: Negative for abdominal pain, constipation, diarrhea, nausea and vomiting.   Skin: Positive for color change and wound.   Neurological: Negative for dizziness, weakness, light-headedness, numbness and headaches.        All pertinent negatives and positives are as above. All other systems have been reviewed and are negative unless otherwise stated.     Objective    Temp:  [96.3 °F (35.7 °C)-98.6 °F (37 °C)] 97.9 °F (36.6 °C)  Heart Rate:  [61-86] 84  Resp:  [18] 18  BP: (115-142)/(56-88) 127/60    Physical Exam  Vitals reviewed.   Constitutional:       Appearance: He is well-developed. He is morbidly obese.      Comments: BMI 42.75   HENT:      Head: Normocephalic and atraumatic.   Eyes:      Pupils: Pupils are equal, round, and reactive to light.   Cardiovascular:      Rate and Rhythm: Normal rate and regular rhythm.      Heart sounds: Normal heart sounds. No murmur heard.    No friction rub. No gallop.   Pulmonary:      Effort: Pulmonary effort is normal. No respiratory distress.      Breath sounds: Normal breath sounds. No wheezing or rales.   Chest:      Chest wall: No tenderness.   Abdominal:      General: Bowel sounds are normal. There is no distension.      Palpations: Abdomen is soft.      Tenderness: There is no  abdominal tenderness.   Musculoskeletal:      Cervical back: Normal range of motion and neck supple.      Right lower leg: Edema present.      Left lower leg: Edema present.   Psychiatric:         Behavior: Behavior normal.       Results Review:  I have reviewed the labs, radiology results, and diagnostic studies.    Laboratory Data:   Results from last 7 days   Lab Units 03/05/22  0538 03/04/22 0455 03/03/22  0558 02/27/22  0512 02/26/22  1523   SODIUM mmol/L 139 136 141   < > 142   POTASSIUM mmol/L 3.8 4.1 3.9   < > 3.6   CHLORIDE mmol/L 102 102 106   < > 107   CO2 mmol/L 27.0 26.0 26.0   < > 26.0   BUN mg/dL 12 10 9   < > 10   CREATININE mg/dL 1.10 0.96 0.87   < > 0.82   GLUCOSE mg/dL 92 93 97   < > 107*   CALCIUM mg/dL 9.0 9.0 9.2   < > 9.2   BILIRUBIN mg/dL  --   --   --   --  0.5   ALK PHOS U/L  --   --   --   --  90   ALT (SGPT) U/L  --   --   --   --  33   AST (SGOT) U/L  --   --   --   --  24   ANION GAP mmol/L 10.0 8.0 9.0   < > 9.0    < > = values in this interval not displayed.     Estimated Creatinine Clearance: 128.4 mL/min (by C-G formula based on SCr of 1.1 mg/dL).          Results from last 7 days   Lab Units 03/05/22 0538 03/04/22 0455 03/03/22 0558 03/02/22  0517 03/01/22  0906   WBC 10*3/mm3 3.62 3.86 3.70 3.81 4.27   HEMOGLOBIN g/dL 10.7* 11.1* 10.7* 10.3* 10.6*   HEMATOCRIT % 32.9* 34.6* 32.8* 31.8* 32.1*   PLATELETS 10*3/mm3 210 213 190 180 184           Culture Data:   No results found for: BLOODCX  No results found for: URINECX  No results found for: RESPCX  No results found for: WOUNDCX  No results found for: STOOLCX  No components found for: BODYFLD    Radiology Data:   Imaging Results (Last 24 Hours)     Procedure Component Value Units Date/Time    XR Chest 1 View [[id number]] Collected: 03/05/22 1005     Updated: 03/05/22 1219    Narrative:        PORTABLE CHEST    HISTORY: Shortness of air. Cellulitis right lower extremity.    Portable AP upright film of the chest was obtained at  10:08 AM.  COMPARISON: Most recent February 26, 2022    FINDINGS:   EKG leads.  Chronic obstructive pulmonary disease.  Linear atelectasis left lung base.  Minimal cardiomegaly.  The pulmonary vasculature is not increased.  No pleural effusion.  No pneumothorax.  No acute osseous abnormality.  Degenerative changes are present in the thoracic spine.      Impression:      CONCLUSION:  Chronic obstructive pulmonary disease.  Linear atelectasis left lung base.  Minimal cardiomegaly.    [id number]    Electronically signed by:  Arnulfo Watkins MD  3/5/2022 12:17 PM CST  Workstation: [id number]          I have reviewed the patient's current medications.     Assessment/Plan     Active Hospital Problems    Diagnosis    • **Chronic cellulitis    • Cellulitis of right lower extremity    • Chronic venous stasis    • Lymphedema    • Morbid obesity (HCC)        Plan:  1.  Chronic lower extremity cellulitis: Continue IV antibiotics.  Slowly improving.  2.  Lymphedema with chronic venous stasis: Wound care following.  Appreciate their help.  Diuresing well.  3.  Morbid obesity: BMI 42.75.  4. Shortness of breath: Better after nebulizer.  Chest x-ray unremarkable.  5.  DVT prophylaxis: Heparin.    The patient was evaluated during the global COVID-19 pandemic, and the diagnosis was suspected/considered upon their initial presentation.  Evaluation, treatment, and testing were consistent with current guidelines for patients who present with complaints or symptoms that may be related to COVID-19.    I confirmed that the patient's Advance Care Plan is present, code status is documented, or surrogate decision maker is listed in the patient's medical record.       Discharge Planning: I expect patient to be discharged to home versus skilled facility in 3-4 days.        This document has been electronically signed by Yogi Keith MD on March 5, 2022 15:11 CST

## 2022-03-05 NOTE — PLAN OF CARE
Goal Outcome Evaluation:  Plan of Care Reviewed With: patient        Progress: improving  Outcome Summary: pt had a dose of reena lasix today and he states his legs are feeling better; pt has lost his iv access however througout shift and attempting to start a new one; will continue to monitor.

## 2022-03-06 LAB
ANION GAP SERPL CALCULATED.3IONS-SCNC: 10 MMOL/L (ref 5–15)
BASOPHILS # BLD AUTO: 0.01 10*3/MM3 (ref 0–0.2)
BASOPHILS NFR BLD AUTO: 0.2 % (ref 0–1.5)
BUN SERPL-MCNC: 15 MG/DL (ref 6–20)
BUN/CREAT SERPL: 13.5 (ref 7–25)
CALCIUM SPEC-SCNC: 9.3 MG/DL (ref 8.6–10.5)
CHLORIDE SERPL-SCNC: 102 MMOL/L (ref 98–107)
CO2 SERPL-SCNC: 25 MMOL/L (ref 22–29)
CREAT SERPL-MCNC: 1.11 MG/DL (ref 0.76–1.27)
DEPRECATED RDW RBC AUTO: 49.4 FL (ref 37–54)
EGFRCR SERPLBLD CKD-EPI 2021: 80.9 ML/MIN/1.73
EOSINOPHIL # BLD AUTO: 0 10*3/MM3 (ref 0–0.4)
EOSINOPHIL NFR BLD AUTO: 0 % (ref 0.3–6.2)
ERYTHROCYTE [DISTWIDTH] IN BLOOD BY AUTOMATED COUNT: 15.7 % (ref 12.3–15.4)
GLUCOSE SERPL-MCNC: 155 MG/DL (ref 65–99)
HCT VFR BLD AUTO: 36.1 % (ref 37.5–51)
HGB BLD-MCNC: 11.7 G/DL (ref 13–17.7)
IMM GRANULOCYTES # BLD AUTO: 0.02 10*3/MM3 (ref 0–0.05)
IMM GRANULOCYTES NFR BLD AUTO: 0.5 % (ref 0–0.5)
LYMPHOCYTES # BLD AUTO: 0.71 10*3/MM3 (ref 0.7–3.1)
LYMPHOCYTES NFR BLD AUTO: 16.5 % (ref 19.6–45.3)
MCH RBC QN AUTO: 27.9 PG (ref 26.6–33)
MCHC RBC AUTO-ENTMCNC: 32.4 G/DL (ref 31.5–35.7)
MCV RBC AUTO: 86.2 FL (ref 79–97)
MONOCYTES # BLD AUTO: 0.08 10*3/MM3 (ref 0.1–0.9)
MONOCYTES NFR BLD AUTO: 1.9 % (ref 5–12)
NEUTROPHILS NFR BLD AUTO: 3.48 10*3/MM3 (ref 1.7–7)
NEUTROPHILS NFR BLD AUTO: 80.9 % (ref 42.7–76)
NRBC BLD AUTO-RTO: 0 /100 WBC (ref 0–0.2)
PLATELET # BLD AUTO: 232 10*3/MM3 (ref 140–450)
PMV BLD AUTO: 9.9 FL (ref 6–12)
POTASSIUM SERPL-SCNC: 4.4 MMOL/L (ref 3.5–5.2)
RBC # BLD AUTO: 4.19 10*6/MM3 (ref 4.14–5.8)
SODIUM SERPL-SCNC: 137 MMOL/L (ref 136–145)
WBC NRBC COR # BLD: 4.3 10*3/MM3 (ref 3.4–10.8)

## 2022-03-06 PROCEDURE — 97165 OT EVAL LOW COMPLEX 30 MIN: CPT

## 2022-03-06 PROCEDURE — 80048 BASIC METABOLIC PNL TOTAL CA: CPT | Performed by: HOSPITALIST

## 2022-03-06 PROCEDURE — 25010000002 HEPARIN (PORCINE) PER 1000 UNITS: Performed by: INTERNAL MEDICINE

## 2022-03-06 PROCEDURE — 97162 PT EVAL MOD COMPLEX 30 MIN: CPT

## 2022-03-06 PROCEDURE — 85025 COMPLETE CBC W/AUTO DIFF WBC: CPT | Performed by: HOSPITALIST

## 2022-03-06 PROCEDURE — 94799 UNLISTED PULMONARY SVC/PX: CPT

## 2022-03-06 RX ORDER — POTASSIUM CHLORIDE 1.5 G/1.77G
20 POWDER, FOR SOLUTION ORAL DAILY
Status: DISCONTINUED | OUTPATIENT
Start: 2022-03-06 | End: 2022-03-07 | Stop reason: HOSPADM

## 2022-03-06 RX ORDER — PREDNISONE 20 MG/1
20 TABLET ORAL
Status: DISCONTINUED | OUTPATIENT
Start: 2022-03-07 | End: 2022-03-07 | Stop reason: HOSPADM

## 2022-03-06 RX ORDER — FUROSEMIDE 40 MG/1
40 TABLET ORAL DAILY
Status: DISCONTINUED | OUTPATIENT
Start: 2022-03-06 | End: 2022-03-07 | Stop reason: HOSPADM

## 2022-03-06 RX ADMIN — Medication 10 ML: at 21:13

## 2022-03-06 RX ADMIN — OXYCODONE HYDROCHLORIDE AND ACETAMINOPHEN 1 TABLET: 10; 325 TABLET ORAL at 09:04

## 2022-03-06 RX ADMIN — POTASSIUM CHLORIDE 20 MEQ: 1.5 POWDER, FOR SOLUTION ORAL at 21:09

## 2022-03-06 RX ADMIN — PANTOPRAZOLE SODIUM 40 MG: 40 TABLET, DELAYED RELEASE ORAL at 05:17

## 2022-03-06 RX ADMIN — Medication 1 APPLICATION: at 16:50

## 2022-03-06 RX ADMIN — HEPARIN SODIUM 5000 UNITS: 5000 INJECTION, SOLUTION INTRAVENOUS; SUBCUTANEOUS at 21:09

## 2022-03-06 RX ADMIN — BACITRACIN 1 APPLICATION: 500 OINTMENT TOPICAL at 16:51

## 2022-03-06 RX ADMIN — HEPARIN SODIUM 5000 UNITS: 5000 INJECTION, SOLUTION INTRAVENOUS; SUBCUTANEOUS at 09:04

## 2022-03-06 RX ADMIN — OXYCODONE HYDROCHLORIDE AND ACETAMINOPHEN 1 TABLET: 10; 325 TABLET ORAL at 14:35

## 2022-03-06 RX ADMIN — Medication 10 ML: at 11:43

## 2022-03-06 RX ADMIN — FUROSEMIDE 40 MG: 40 TABLET ORAL at 21:09

## 2022-03-06 RX ADMIN — ALBUTEROL SULFATE 2.5 MG: 2.5 SOLUTION RESPIRATORY (INHALATION) at 00:56

## 2022-03-06 NOTE — THERAPY EVALUATION
Acute Care - Occupational Therapy Initial Evaluation  [facility]     Patient Name: Jalil Camarillo  : 1970  MRN: [id number]  Today's Date: 3/6/2022  Onset of Illness/Injury or Date of Surgery: 22  Date of Referral to OT: 22  Referring Physician: JOSE Ayers MD    Admit Date: 2022       ICD-10-CM ICD-9-CM   1. Cellulitis of right lower extremity  L03.115 682.6   2. Impaired mobility and activities of daily living  Z74.09 V49.89    Z78.9      Patient Active Problem List   Diagnosis   • Localized edema   • Hilar mass   • Morbid obesity (HCC)   • Chronic cellulitis   • Lymphedema   • Chronic venous stasis   • Cellulitis of right lower extremity     Past Medical History:   Diagnosis Date   • Chronic cellulitis    • Chronic venous stasis    • COPD (chronic obstructive pulmonary disease) (HCC)    • GERD (gastroesophageal reflux disease)    • Hyperlipidemia    • Hypertension    • Hypothyroidism    • Lymphedema    • Morbid obesity (HCC)    • Osteoarthritis      Past Surgical History:   Procedure Laterality Date   • HERNIA REPAIR Right          OT ASSESSMENT FLOWSHEET (last 12 hours)     OT Evaluation and Treatment     Row Name 22 1358                   OT Time and Intention    Subjective Information complains of;pain  -RB        Document Type evaluation  -RB        Mode of Treatment co-treatment;physical therapy;occupational therapy  -RB        Patient Effort good  -RB                  General Information    Patient Profile Reviewed yes  -RB        Onset of Illness/Injury or Date of Surgery 22  -RB        Referring Physician JOSE Ayers MD  -RB        General Observations of Patient Sitting in recliner with tele and room air.  -RB        Prior Level of Function independent:;ADL's;all household mobility  -RB        Equipment Currently Used at Home cane, straight;walker, rolling  -RB        Equipment Issued to Patient gait belt  -RB                  Living Environment    Current  Living Arrangements home  -RB        People in Home child(mary), adult  -RB        Primary Care Provided by self  -RB                  Home Use of Assistive/Adaptive Equipment    Equipment Currently Used at Home cane, straight  Has R/W.  -RB                  Pain Assessment    Pretreatment Pain Rating 8/10  -RB        Posttreatment Pain Rating 8/10  -RB        Pain Location - Side/Orientation Bilateral  -RB        Pain Location - ankle  -RB        Pain Intervention(s) Ambulation/increased activity  -RB                  Cognition    Affect/Mental Status (Cognition) WFL  -RB        Orientation Status (Cognition) oriented x 4  -RB                  Range of Motion (ROM)    Range of Motion ROM is WNL;bilateral upper extremities  -RB                  Strength Comprehensive (MMT)    General Manual Muscle Testing (MMT) Assessment other (see comments)  -RB        Comment, General Manual Muscle Testing (MMT) Assessment 4+ to 5/5 for B UE str  -RB                  Activities of Daily Living    BADL Assessment/Intervention lower body dressing  -RB                  Lower Body Dressing Assessment/Training    [place] Level (Lower Body Dressing) lower body dressing skills;doff;socks;dependent (less than 25% patient effort)  -RB        Comment, (Lower Body Dressing) Sitting in recliner.  -RB                  BADL Safety/Performance    Impairments, BADL Safety/Performance endurance/activity tolerance;range of motion  -RB                  Bed Mobility    Comment, (Bed Mobility) Pt in bedside chair when therapy arrived.  -RB                  Functional Mobility    Functional Mobility- Ind. Level supervision required  -RB        Functional Mobility- Device rolling walker  -RB        Functional Mobility-Distance (Feet) 200  -RB                  Transfer Assessment/Treatment    Transfers sit-stand transfer;stand-sit transfer;toilet transfer  -RB                  Transfers    Sit-Stand [place] (Transfers) supervision  -RB         Stand-Sit [place] (Transfers) supervision  -RB        [place] Level (Toilet Transfer) supervision  -RB        Assistive Device (Toilet Transfer) walker, front-wheeled  -RB                  Sit-Stand Transfer    Assistive Device (Sit-Stand Transfers) walker, 4-wheeled  -RB                  Stand-Sit Transfer    Assistive Device (Stand-Sit Transfers) walker, 4-wheeled  -RB                  Toilet Transfer    Type (Toilet Transfer) sit-stand;stand-sit  -RB                  Wound 07/16/21 1445 Right leg     Wound - Properties Group Placement Date: 07/16/21  -RG Placement Time: 1445  -RG Present on Hospital Admission: Y  -RG Side: Right  -RG Location: leg  -RG Primary Wound Type: --  -RG, EDEMA/LYMPHEDEMA         Retired Wound - Properties Group Placement Date: 07/16/21  -RG Placement Time: 1445  -RG Present on Hospital Admission: Y  -RG Side: Right  -RG Location: leg  -RG Primary Wound Type: --  -RG, EDEMA/LYMPHEDEMA         Retired Wound - Properties Group Date first assessed: 07/16/21  -RG Time first assessed: 1445  -RG Present on Hospital Admission: Y  -RG Side: Right  -RG Location: leg  -RG Primary Wound Type: --  -RG, EDEMA/LYMPHEDEMA                   Wound 07/16/21 1445 Left leg     Wound - Properties Group Placement Date: 07/16/21  -RG Placement Time: 1445  -RG Present on Hospital Admission: Y  -RG Side: Left  -RG Location: leg  -RG Primary Wound Type: --  -RG, EDEMA/ LYMPHEDEMA         Retired Wound - Properties Group Placement Date: 07/16/21  -RG Placement Time: 1445  -RG Present on Hospital Admission: Y  -RG Side: Left  -RG Location: leg  -RG Primary Wound Type: --  -RG, EDEMA/ LYMPHEDEMA         Retired Wound - Properties Group Date first assessed: 07/16/21  -RG Time first assessed: 1445  -RG Present on Hospital Admission: Y  -RG Side: Left  -RG Location: leg  -RG Primary Wound Type: --  -RG, EDEMA/ LYMPHEDEMA                   Plan of Care Review    Plan of Care Reviewed With patient  -RB                   Vital Signs    Pre Systolic BP Rehab 115  -RB        Pre Treatment Diastolic BP 58  -RB        Post Systolic BP Rehab 142  -RB        Post Treatment Diastolic BP 61  -RB        Pretreatment Heart Rate (beats/min) 86  -RB        Intratreatment Heart Rate (beats/min) 79  -RB        Posttreatment Heart Rate (beats/min) 70  -RB        Pre SpO2 (%) 94  -RB        O2 Delivery Pre Treatment room air  -RB        Intra SpO2 (%) 96  -RB        O2 Delivery Intra Treatment room air  -RB        Post SpO2 (%) 96  -RB        O2 Delivery Post Treatment room air  -RB        Pre Patient Position Sitting  -RB        Intra Patient Position Standing  -RB        Post Patient Position Sitting  -RB                  Positioning and Restraints    Pre-Treatment Position sitting in chair/recliner  -RB        Post Treatment Position chair  -RB        In Chair call light within reach;encouraged to call for assist  -RB                  Therapy Assessment/Plan (OT)    Date of Referral to OT 03/05/22  -RB        Functional Level at Time of Evaluation (OT) Imp mob and ADLs.  -RB        OT Diagnosis Imp mob and ADLs.  -RB        Rehab Potential (OT) good, to achieve stated therapy goals  -RB        Criteria for Skilled Therapeutic Interventions Met (OT) yes;meets criteria  -RB        Therapy Frequency (OT) other (see comments)  3-7 days/wk  -RB        Predicted Duration of Therapy Intervention (OT) Until D/C or goals met.  -RB        Problem List (OT) problems related to;inability to direct their own care;strength;mobility;balance  -RB        Activity Limitations Related to Problem List (OT) unable to ambulate safely;unable to transfer safely  -RB        Planned Therapy Interventions (OT) activity tolerance training;adaptive equipment training;BADL retraining;functional balance retraining;occupation/activity based interventions;patient/caregiver education/training;strengthening exercise;transfer/mobility retraining  -RB                   Evaluation Complexity (OT)    Review Occupational Profile/Medical/Therapy History Complexity brief/low complexity  -RB        Assessment, Occupational Performance/Identification of Deficit Complexity 1-3 performance deficits  -RB        Clinical Decision Making Complexity (OT) problem focused assessment/low complexity  -RB        Overall Complexity of Evaluation (OT) low complexity  -RB                  Therapy Plan Review/Discharge Plan (OT)    Anticipated Discharge Disposition (OT) home with assist  -RB                  OT Goals    Transfer Goal Selection (OT) transfer, OT goal 1  -RB        Bathing Goal Selection (OT) bathing, OT goal 1  -RB        Dressing Goal Selection (OT) dressing, OT goal 1  -RB        Activity Tolerance Goal Selection (OT) activity tolerance, OT goal 1  -RB                  Transfer Goal 1 (OT)    Activity/Assistive Device (Transfer Goal 1, OT) transfers, all  -RB        [place] Level/Cues Needed (Transfer Goal 1, OT) modified independence  -RB        Time Frame (Transfer Goal 1, OT) long term goal (LTG)  -RB        Progress/Outcome (Transfer Goal 1, OT) goal not met  -RB                  Bathing Goal 1 (OT)    Activity/Device (Bathing Goal 1, OT) bathing skills, all  -RB        [place] Level/Cues Needed (Bathing Goal 1, OT) contact guard required  -RB        Time Frame (Bathing Goal 1, OT) long term goal (LTG)  -RB        Progress/Outcomes (Bathing Goal 1, OT) goal not met  -RB                  Dressing Goal 1 (OT)    Activity/Device (Dressing Goal 1, OT) dressing skills, all  -RB        [place]/Cues Needed (Dressing Goal 1, OT) supervision required  -RB        Time Frame (Dressing Goal 1, OT) long term goal (LTG)  -RB        Progress/Outcome (Dressing Goal 1, OT) goal not met  -RB                   Activity Tolerance Goal 1 (OT)    Activity Tolerance Goal 1 (OT) ADL/therapeutic activity with 1-2 rest breaks.  -RB        Activity Level (Endurance Goal 1, OT) 20 min activity   -RB        Time Frame (Activity Tolerance Goal 1, OT) long term goal (LTG)  -RB        Progress/Outcome (Activity Tolerance Goal 1, OT) goal not met  -RB              User Key  (r) = Recorded By, (t) = Taken By, (c) = Cosigned By    Initials Name Effective Dates    RB Bird Baugh OT 06/16/21 -     Aidee Hwang RN 06/16/21 - 09/26/21                 Occupational Therapy Education                 Title: PT OT SLP Therapies (In Progress)     Topic: Occupational Therapy (In Progress)     Point: ADL training (Not Started)     Description:   Instruct learner(s) on proper safety adaptation and remediation techniques during self care or transfers.   Instruct in proper use of assistive devices.              Learner Progress:  Not documented in this visit.          Point: Home exercise program (Not Started)     Description:   Instruct learner(s) on appropriate technique for monitoring, assisting and/or progressing therapeutic exercises/activities.              Learner Progress:  Not documented in this visit.          Point: Precautions (Done)     Description:   Instruct learner(s) on prescribed precautions during self-care and functional transfers.              Learning Progress Summary           Patient Acceptance, E, VU by RB at 3/6/2022 1612    Comment: Edu pt on use of non skid socks when OOB.                   Point: Body mechanics (Not Started)     Description:   Instruct learner(s) on proper positioning and spine alignment during self-care, functional mobility activities and/or exercises.              Learner Progress:  Not documented in this visit.                      User Key     Initials Effective Dates Name Provider Type Discipline     06/16/21 -  Bird Baugh OT Occupational Therapist OT                  OT Recommendation and Plan  Planned Therapy Interventions (OT): activity tolerance training, adaptive equipment training, BADL retraining, functional balance retraining, occupation/activity based  interventions, patient/caregiver education/training, strengthening exercise, transfer/mobility retraining  Therapy Frequency (OT): other (see comments) (3-7 days/wk)  Plan of Care Review  Plan of Care Reviewed With: patient  Outcome Evaluation: OT eval on this date as co-eval with PT.  Pt was supervision for sit to stand to sit, toilet transfer and 150' plus for ambulation with R/W.  Pt was dependent with LB sock donning - could benefit from use of sock aide for LB dressing.  Pt could also benefit from OT services here at hospital for increased safety and endurance with ADLs and functional mobility.  Plan of Care Reviewed With: patient  Outcome Evaluation: OT eval on this date as co-eval with PT.  Pt was supervision for sit to stand to sit, toilet transfer and 150' plus for ambulation with R/W.  Pt was dependent with LB sock donning - could benefit from use of sock aide for LB dressing.  Pt could also benefit from OT services here at hospital for increased safety and endurance with ADLs and functional mobility.     Outcome Measures     Row Name 03/06/22 0059             How much help from another is currently needed...    Putting on and taking off regular lower body clothing? 2  -RB      Bathing (including washing, rinsing, and drying) 2  -RB      Toileting (which includes using toilet bed pan or urinal) 3  -RB      Putting on and taking off regular upper body clothing 4  -RB      Taking care of personal grooming (such as brushing teeth) 4  -RB      Eating meals 4  -RB      AM-PAC 6 Clicks Score (OT) 19  -RB              Functional Assessment    Outcome Measure Options AM-PAC 6 Clicks Daily Activity (OT)  -RB            User Key  (r) = Recorded By, (t) = Taken By, (c) = Cosigned By    Initials Name Provider Type    RB Bird Baugh, OT Occupational Therapist                Time Calculation:    Time Calculation- OT     Row Name 03/06/22 1617             Time Calculation- OT    OT Start Time 1358  -RB      OT Stop  Time 1434  -RB      OT Time Calculation (min) 36 min  -RB      OT Received On 03/06/22  -RB      OT Goal Re-Cert Due Date 03/19/22  -            User Key  (r) = Recorded By, (t) = Taken By, (c) = Cosigned By    Initials Name Provider Type    Bird Garcia OT Occupational Therapist              Therapy Charges for Today     Code Description Service Date Service Provider Modifiers Qty    [id number] HC OT EVAL LOW COMPLEXITY 2 3/6/2022 Bird Baugh OT GO 1               Bird Baugh OT  3/6/2022

## 2022-03-06 NOTE — PLAN OF CARE
Goal Outcome Evaluation:  Plan of Care Reviewed With: patient        Progress: improving  Outcome Evaluation: patient's leg swelling is down some today; he has not had a dose of diuretic this shift and pt was concerned about that. Dr. Alcaraz paged earlier and states he was going to look into it. will cont to monitor

## 2022-03-06 NOTE — NURSING NOTE
"Paged Dr. Keith to notify him about patient c/o something \"hung\" in throat. No new orders at this time.   "

## 2022-03-06 NOTE — PLAN OF CARE
Goal Outcome Evaluation:  Plan of Care Reviewed With: patient        Progress: no change  Outcome Evaluation: PT Divinaal completed w/ OT: pt up in chair, able to move sit<>stand, gait to/from toilet and gait in hill for total 316 ft w/ FWRW.He was instructed stacked breathing and PLB for SOA post gait; His goal for d/c home w/ famly is attainable. PT will follow for mobility tolerance and progression while here.

## 2022-03-06 NOTE — THERAPY EVALUATION
Patient Name: Jalil Camarillo  : 1970    MRN: [id number]                              Today's Date: 3/6/2022  PT Evaluation      Admit Date: 2022    Visit Dx:     ICD-10-CM ICD-9-CM   1. Cellulitis of right lower extremity  L03.115 682.6   2. Impaired mobility and activities of daily living  Z74.09 V49.89    Z78.9    3. Impaired functional mobility, balance, gait, and endurance  Z74.09 V49.89     Patient Active Problem List   Diagnosis   • Localized edema   • Hilar mass   • Morbid obesity (HCC)   • Chronic cellulitis   • Lymphedema   • Chronic venous stasis   • Cellulitis of right lower extremity     Past Medical History:   Diagnosis Date   • Chronic cellulitis    • Chronic venous stasis    • COPD (chronic obstructive pulmonary disease) ([facility])    • GERD (gastroesophageal reflux disease)    • Hyperlipidemia    • Hypertension    • Hypothyroidism    • Lymphedema    • Morbid obesity (HCC)    • Osteoarthritis      Past Surgical History:   Procedure Laterality Date   • HERNIA REPAIR Right       General Information     Row Name 22 1359          Physical Therapy Time and Intention    Document Type evaluation  -GB     Mode of Treatment co-treatment;physical therapy;occupational therapy  -GB     Row Name 22 1355          General Information    Patient Profile Reviewed yes  -GB     Prior Level of Function independent:;all household mobility;ADL's  -GB     Existing Precautions/Restrictions fall  -GB     Row Name 22 1359          Living Environment    People in Home child(mary), adult  -GB     Row Name 22 1356          Cognition    Orientation Status (Cognition) oriented x 4  -GB           User Key  (r) = Recorded By, (t) = Taken By, (c) = Cosigned By    Initials Name Provider Type    Radha Whitmore, PT Physical Therapist               Mobility     Row Name 22 135          Bed-Chair Transfer    Bed-Chair [place] (Transfers) modified independence;supervision  -GB      Assistive Device (Bed-Chair Transfers) walker, front-wheeled  -GB     Row Name 03/06/22 1359          Sit-Stand Transfer    Sit-Stand [place] (Transfers) supervision  -GB     Assistive Device (Sit-Stand Transfers) walker, 4-wheeled  -GB     Row Name 03/06/22 1359          Gait/Stairs (Locomotion)    [place] (Gait) modified independence  -GB     Assistive Device (Gait) walker, front-wheeled  -GB     Distance in Feet (Gait) 316 ft total  -GB     Deviations/Abnormal Patterns (Gait) gait speed decreased;griselda decreased  -GB     Comment, (Gait/Stairs) has not been walkng much w/ inc edema due to pain and needing to elevate LEs  -GB           User Key  (r) = Recorded By, (t) = Taken By, (c) = Cosigned By    Initials Name Provider Type    Radha Whitmore, PT Physical Therapist               Obj/Interventions     Row Name 03/06/22 1359          Range of Motion Comprehensive    General Range of Motion no range of motion deficits identified  -     Row Name 03/06/22 1359          Strength Comprehensive (MMT)    Comment, General Manual Muscle Testing (MMT) Assessment damien LEs grossly 4-/5 w/ short duration contraction time and tremulous as it resisted; acknowledges less LE use while elevaitng LEs  -GB     Row Name 03/06/22 1359          Sensory Assessment (Somatosensory)    Sensory Assessment (Somatosensory) LE sensation intact  -GB           User Key  (r) = Recorded By, (t) = Taken By, (c) = Cosigned By    Initials Name Provider Type    Radha Whitmore, PT Physical Therapist               Goals/Plan     Row Name 03/06/22 1731          Bed Mobility Goal 1 (PT)    Activity/Assistive Device (Bed Mobility Goal 1, PT) bed mobility activities, all  -GB     [place] Level/Cues Needed (Bed Mobility Goal 1, PT) modified independence;independent  -GB     Time Frame (Bed Mobility Goal 1, PT) 3 days  -GB     Progress/Outcomes (Bed Mobility Goal 1, PT) goal ongoing  -     Row Name 03/06/22  1731          Transfer Goal 1 (PT)    Activity/Assistive Device (Transfer Goal 1, PT) bed-to-chair/chair-to-bed;toilet  -GB     [place] Level/Cues Needed (Transfer Goal 1, PT) modified independence  -GB     Time Frame (Transfer Goal 1, PT) 3 days  -GB     Progress/Outcome (Transfer Goal 1, PT) goal partially met  -GB     Row Name 03/06/22 1731          Gait Training Goal 1 (PT)    Activity/Assistive Device (Gait Training Goal 1, PT) assistive device use;gait (walking locomotion)  -GB     [place] Level (Gait Training Goal 1, PT) modified independence  -GB     Distance (Gait Training Goal 1, PT) 500 ft or more w/ VSS  -GB     Time Frame (Gait Training Goal 1, PT) 5 days  -GB     Progress/Outcome (Gait Training Goal 1, PT) goal not met  -GB     Row Name 03/06/22 1731          Stairs Goal 1 (PT)    Activity/Assistive Device (Stairs Goal 1, PT) ascending stairs;descending stairs  -GB     [place] Level/Cues Needed (Stairs Goal 1, PT) modified independence;supervision required  -GB     Number of Stairs (Stairs Goal 1, PT) 1 or more if he is willing  -GB     Time Frame (Stairs Goal 1, PT) 4 days  -GB     Progress/Outcome (Stairs Goal 1, PT) goal not met  -GB     Row Name 03/06/22 1731          Patient Education Goal (PT)    Activity (Patient Education Goal, PT) demonstrates PLB and stacked breathing post activity for recovery breathing indep  -GB     [place]/Cues/Accuracy (Memory Goal 2, PT) demonstrates adequately  -GB     Time Frame (Patient Education Goal, PT) 3 days  -GB     Progress/Outcome (Patient Education Goal, PT) goal partially met  -GB           User Key  (r) = Recorded By, (t) = Taken By, (c) = Cosigned By    Initials Name Provider Type    GB Radha Schulz, PT Physical Therapist               Clinical Impression     Row Name 03/06/22 1359          Pain    Pretreatment Pain Rating 8/10  -GB     Posttreatment Pain Rating 8/10  -GB     Pain Location - Side/Orientation Bilateral  -GB      Pain Location - ankle  -GB     Pain Intervention(s) Ambulation/increased activity  -GB     Row Name 03/06/22 6155          Plan of Care Review    Plan of Care Reviewed With patient  -GB     Progress no change  -GB     Outcome Evaluation PT Eval completed w/ OT: pt up in chair, able to move sit<>stand, gait to/from toilet and gait in hill for total 316 ft w/ FWRW.He was instructed stacked breathing and PLB for SOA post gait; His goal for d/c home w/ famly is attainable. PT will follow for mobility tolerance and progression while here.  -GB     Row Name 03/06/22 1353          Therapy Assessment/Plan (PT)    Rehab Potential (PT) good, to achieve stated therapy goals  -GB     Criteria for Skilled Interventions Met (PT) yes  -GB     Predicted Duration of Therapy Intervention (PT) till goals met or d/c  -GB     Row Name 03/06/22 1352          Vital Signs    Pre Systolic BP Rehab 115  -GB     Pre Treatment Diastolic BP 58  -GB     Post Systolic BP Rehab 142  -GB     Post Treatment Diastolic BP 61  -GB     Pretreatment Heart Rate (beats/min) 86  -GB     Intratreatment Heart Rate (beats/min) 79  -GB     Posttreatment Heart Rate (beats/min) 70  -GB     Pre SpO2 (%) 94  -GB     O2 Delivery Pre Treatment room air  -GB     Intra SpO2 (%) 96  -GB     O2 Delivery Intra Treatment room air  -GB     Post SpO2 (%) 96  -GB     O2 Delivery Post Treatment room air  -GB     Pre Patient Position Sitting  -GB     Intra Patient Position Standing  -GB     Post Patient Position Sitting  -GB     Row Name 03/06/22 7324          Positioning and Restraints    Pre-Treatment Position sitting in chair/recliner  -GB     Post Treatment Position chair  -GB     In Chair call light within reach;encouraged to call for assist;sitting  -GB           User Key  (r) = Recorded By, (t) = Taken By, (c) = Cosigned By    Initials Name Provider Type    Radha Whitmore, PT Physical Therapist               Outcome Measures     Row Name 03/06/22 2636           How much help from another person do you currently need...    Turning from your back to your side while in flat bed without using bedrails? 3  -GB     Moving from lying on back to sitting on the side of a flat bed without bedrails? 3  -GB     Moving to and from a bed to a chair (including a wheelchair)? 3  -GB     Standing up from a chair using your arms (e.g., wheelchair, bedside chair)? 4  -GB     Climbing 3-5 steps with a railing? 2  -GB     To walk in hospital room? 3  -GB     AM-PAC 6 Clicks Score (PT) 18  -GB     Row Name 03/06/22 1734 03/06/22 1358       Functional Assessment    Outcome Measure Options AM-PAC 6 Clicks Basic Mobility (PT)  -GB AM-PAC 6 Clicks Daily Activity (OT)  -RB          User Key  (r) = Recorded By, (t) = Taken By, (c) = Cosigned By    Initials Name Provider Type    RB Bird Baugh, OT Occupational Therapist    Dariusz Whitmore, PT Physical Therapist                             Physical Therapy Education                 Title: PT OT SLP Therapies (In Progress)     Topic: Physical Therapy (Done)     Point: Mobility training (Done)     Learning Progress Summary           Patient Acceptance, E,D, DU,VU,NR by  at 3/6/2022 1735    Comment: POC: stacked breathing and PLB for recovery post dariusz/activity                   Point: Home exercise program (Done)     Learning Progress Summary           Patient Acceptance, E,D, DU,VU,NR by  at 3/6/2022 1735    Comment: POC: stacked breathing and PLB for recovery post dariusz/activity                   Point: Precautions (Done)     Learning Progress Summary           Patient Acceptance, E,D, DU,VU,NR by  at 3/6/2022 1735    Comment: POC: stacked breathing and PLB for recovery post dariusz/activity                               User Key     Initials Effective Dates Name Provider Type Discipline     06/16/21 -  Dariusz Schulz, PT Physical Therapist PT              PT Recommendation and Plan  Planned Therapy Interventions  (PT): balance training, bed mobility training, gait training, transfer training, home exercise program, patient/family education, stair training, strengthening  Plan of Care Reviewed With: patient  Progress: no change  Outcome Evaluation: PT Eval completed w/ OT: pt up in chair, able to move sit<>stand, gait to/from toilet and gait in hill for total 316 ft w/ FWRW.He was instructed stacked breathing and PLB for SOA post gait; His goal for d/c home w/ famly is attainable. PT will follow for mobility tolerance and progression while here.     Time Calculation:    PT Charges     Row Name 03/06/22 1358             Time Calculation    Start Time 1358  -GB      Stop Time 1434  -GB      Time Calculation (min) 36 min  -GB      PT Received On 03/06/22  -GB      PT Goal Re-Cert Due Date 03/19/22  -GB              Untimed Charges    PT Eval/Re-eval Minutes 36  -GB              Total Minutes    Untimed Charges Total Minutes 36  -GB       Total Minutes 36  -GB            User Key  (r) = Recorded By, (t) = Taken By, (c) = Cosigned By    Initials Name Provider Type    Radha Whitmore, PT Physical Therapist              Therapy Charges for Today     Code Description Service Date Service Provider Modifiers Qty    48044666978 HC PT EVAL MOD COMPLEXITY 2 3/6/2022 Radha Schulz, PT GP 1    37122062767 HC PT THER SUPP EA 15 MIN 3/6/2022 Radha Schulz, PT GP 1          PT G-Codes  Outcome Measure Options: AM-PAC 6 Clicks Basic Mobility (PT)  AM-PAC 6 Clicks Score (PT): 18  AM-PAC 6 Clicks Score (OT): 19    Radha Schulz PT  3/6/2022

## 2022-03-06 NOTE — PLAN OF CARE
"Goal Outcome Evaluation:  Plan of Care Reviewed With: patient        Progress: improving  Outcome Evaluation: pt pleasant and cooperative. v/s are stable. continues to c/o something \"hung\" in his throat. pt has prn nebs ordered and has received them. no s/s of distress noted or observed at this time.  "

## 2022-03-06 NOTE — PLAN OF CARE
Goal Outcome Evaluation:  Plan of Care Reviewed With: patient           Outcome Evaluation: OT eval on this date as co-eval with PT.  Pt was supervision for sit to stand to sit, toilet transfer and 150' plus for ambulation with R/W.  Pt was dependent with LB sock donning - could benefit from use of sock aide for LB dressing.  Pt could also benefit from OT services here at hospital for increased safety and endurance with ADLs and functional mobility.

## 2022-03-06 NOTE — PLAN OF CARE
"Goal Outcome Evaluation:  Plan of Care Reviewed With: patient        Progress: no change  Outcome Evaluation: pt has been c/o something \"hung\" in his throat like a hairball; he has prn nebs ordered and does have some wheezing at times; will continue to monitor  "

## 2022-03-07 ENCOUNTER — HOME HEALTH ADMISSION (OUTPATIENT)
Dept: HOME HEALTH SERVICES | Facility: HOME HEALTHCARE | Age: 52
End: 2022-03-07

## 2022-03-07 VITALS
BODY MASS INDEX: 39.17 KG/M2 | RESPIRATION RATE: 18 BRPM | TEMPERATURE: 98.2 F | WEIGHT: 315 LBS | HEIGHT: 75 IN | HEART RATE: 73 BPM | SYSTOLIC BLOOD PRESSURE: 120 MMHG | OXYGEN SATURATION: 92 % | DIASTOLIC BLOOD PRESSURE: 59 MMHG

## 2022-03-07 LAB
ANION GAP SERPL CALCULATED.3IONS-SCNC: 11 MMOL/L (ref 5–15)
BASOPHILS # BLD AUTO: 0.02 10*3/MM3 (ref 0–0.2)
BASOPHILS NFR BLD AUTO: 0.4 % (ref 0–1.5)
BUN SERPL-MCNC: 14 MG/DL (ref 6–20)
BUN/CREAT SERPL: 14.7 (ref 7–25)
CALCIUM SPEC-SCNC: 9.1 MG/DL (ref 8.6–10.5)
CHLORIDE SERPL-SCNC: 103 MMOL/L (ref 98–107)
CO2 SERPL-SCNC: 26 MMOL/L (ref 22–29)
CREAT SERPL-MCNC: 0.95 MG/DL (ref 0.76–1.27)
DEPRECATED RDW RBC AUTO: 50.4 FL (ref 37–54)
EGFRCR SERPLBLD CKD-EPI 2021: 97.5 ML/MIN/1.73
EOSINOPHIL # BLD AUTO: 0.17 10*3/MM3 (ref 0–0.4)
EOSINOPHIL NFR BLD AUTO: 3.6 % (ref 0.3–6.2)
ERYTHROCYTE [DISTWIDTH] IN BLOOD BY AUTOMATED COUNT: 16.1 % (ref 12.3–15.4)
GLUCOSE SERPL-MCNC: 93 MG/DL (ref 65–99)
HCT VFR BLD AUTO: 36.2 % (ref 37.5–51)
HGB BLD-MCNC: 11.6 G/DL (ref 13–17.7)
IMM GRANULOCYTES # BLD AUTO: 0.01 10*3/MM3 (ref 0–0.05)
IMM GRANULOCYTES NFR BLD AUTO: 0.2 % (ref 0–0.5)
LYMPHOCYTES # BLD AUTO: 1.98 10*3/MM3 (ref 0.7–3.1)
LYMPHOCYTES NFR BLD AUTO: 41.8 % (ref 19.6–45.3)
MCH RBC QN AUTO: 28 PG (ref 26.6–33)
MCHC RBC AUTO-ENTMCNC: 32 G/DL (ref 31.5–35.7)
MCV RBC AUTO: 87.2 FL (ref 79–97)
MONOCYTES # BLD AUTO: 0.42 10*3/MM3 (ref 0.1–0.9)
MONOCYTES NFR BLD AUTO: 8.9 % (ref 5–12)
NEUTROPHILS NFR BLD AUTO: 2.14 10*3/MM3 (ref 1.7–7)
NEUTROPHILS NFR BLD AUTO: 45.1 % (ref 42.7–76)
NRBC BLD AUTO-RTO: 0 /100 WBC (ref 0–0.2)
PLATELET # BLD AUTO: 212 10*3/MM3 (ref 140–450)
PMV BLD AUTO: 9.8 FL (ref 6–12)
POTASSIUM SERPL-SCNC: 4 MMOL/L (ref 3.5–5.2)
RBC # BLD AUTO: 4.15 10*6/MM3 (ref 4.14–5.8)
SODIUM SERPL-SCNC: 140 MMOL/L (ref 136–145)
WBC NRBC COR # BLD: 4.74 10*3/MM3 (ref 3.4–10.8)

## 2022-03-07 PROCEDURE — 97116 GAIT TRAINING THERAPY: CPT

## 2022-03-07 PROCEDURE — 85025 COMPLETE CBC W/AUTO DIFF WBC: CPT | Performed by: HOSPITALIST

## 2022-03-07 PROCEDURE — 97535 SELF CARE MNGMENT TRAINING: CPT

## 2022-03-07 PROCEDURE — 80048 BASIC METABOLIC PNL TOTAL CA: CPT | Performed by: HOSPITALIST

## 2022-03-07 PROCEDURE — 97110 THERAPEUTIC EXERCISES: CPT

## 2022-03-07 PROCEDURE — 63710000001 PREDNISONE PER 1 MG: Performed by: HOSPITALIST

## 2022-03-07 PROCEDURE — 25010000002 HEPARIN (PORCINE) PER 1000 UNITS: Performed by: INTERNAL MEDICINE

## 2022-03-07 RX ORDER — FUROSEMIDE 40 MG/1
40 TABLET ORAL DAILY
Qty: 30 TABLET | Refills: 0 | Status: ON HOLD | OUTPATIENT
Start: 2022-03-08 | End: 2022-04-21

## 2022-03-07 RX ORDER — ACETAMINOPHEN 325 MG/1
650 TABLET ORAL EVERY 6 HOURS PRN
Status: DISCONTINUED | OUTPATIENT
Start: 2022-03-07 | End: 2022-03-07 | Stop reason: HOSPADM

## 2022-03-07 RX ORDER — PREDNISONE 20 MG/1
20 TABLET ORAL
Qty: 7 TABLET | Refills: 0 | Status: SHIPPED | OUTPATIENT
Start: 2022-03-08 | End: 2022-03-15

## 2022-03-07 RX ORDER — TRAMADOL HYDROCHLORIDE 50 MG/1
50 TABLET ORAL EVERY 6 HOURS PRN
Status: DISCONTINUED | OUTPATIENT
Start: 2022-03-07 | End: 2022-03-07 | Stop reason: HOSPADM

## 2022-03-07 RX ORDER — POTASSIUM CHLORIDE 1.5 G/1.77G
20 POWDER, FOR SOLUTION ORAL DAILY
Qty: 30 PACKET | Refills: 0 | Status: SHIPPED | OUTPATIENT
Start: 2022-03-08 | End: 2022-04-07

## 2022-03-07 RX ORDER — DIAPER,BRIEF,INFANT-TODD,DISP
1 EACH MISCELLANEOUS EVERY 12 HOURS SCHEDULED
Qty: 14 G | Refills: 0 | Status: SHIPPED | OUTPATIENT
Start: 2022-03-07 | End: 2022-03-14

## 2022-03-07 RX ADMIN — TRAMADOL HYDROCHLORIDE 50 MG: 50 TABLET, COATED ORAL at 11:59

## 2022-03-07 RX ADMIN — Medication 1 APPLICATION: at 08:31

## 2022-03-07 RX ADMIN — FUROSEMIDE 40 MG: 40 TABLET ORAL at 08:29

## 2022-03-07 RX ADMIN — TRAMADOL HYDROCHLORIDE 50 MG: 50 TABLET, COATED ORAL at 05:31

## 2022-03-07 RX ADMIN — POTASSIUM CHLORIDE 20 MEQ: 1.5 POWDER, FOR SOLUTION ORAL at 08:29

## 2022-03-07 RX ADMIN — BACITRACIN 1 APPLICATION: 500 OINTMENT TOPICAL at 08:31

## 2022-03-07 RX ADMIN — PANTOPRAZOLE SODIUM 40 MG: 40 TABLET, DELAYED RELEASE ORAL at 05:31

## 2022-03-07 RX ADMIN — PREDNISONE 20 MG: 20 TABLET ORAL at 08:29

## 2022-03-07 RX ADMIN — HEPARIN SODIUM 5000 UNITS: 5000 INJECTION, SOLUTION INTRAVENOUS; SUBCUTANEOUS at 08:29

## 2022-03-07 RX ADMIN — Medication 10 ML: at 08:30

## 2022-03-07 NOTE — PLAN OF CARE
Goal Outcome Evaluation:  Plan of Care Reviewed With: patient        Progress: improving  Outcome Evaluation: Pt up in recliner upon entry. Sit-stand-SBA. Pt completed Fxl mobility to sink w/ SBA and RW. Pt stood ~5 mins and completed shaving activity w/ good tolerance. Pt then amb ~77' SBA w/ RW and good tolerance. Pt chepe B UE ther ex in all planes w/ 2lb HW. Pt up in recliner upon exit w/ all needs in reach. Cont OT POC.

## 2022-03-07 NOTE — PLAN OF CARE
Goal Outcome Evaluation:  Plan of Care Reviewed With: patient        Progress: improving  Outcome Evaluation: pt pleasant and cooperative. v/s are stable. lasix was restarted. no adverse side effects noted or observed. no s/s of distress noted or observed at this time.

## 2022-03-07 NOTE — THERAPY TREATMENT NOTE
Acute Care - Physical Therapy Treatment Note  [facility]     Patient Name: Jalil Camarillo  : 1970  MRN: [id number]  Today's Date: 3/7/2022   Onset of Illness/Injury or Date of Surgery: 22  Visit Dx:     ICD-10-CM ICD-9-CM   1. Cellulitis of right lower extremity  L03.115 682.6   2. Impaired mobility and activities of daily living  Z74.09 V49.89    Z78.9    3. Impaired functional mobility, balance, gait, and endurance  Z74.09 V49.89   4. Chronic cellulitis  L03.90 682.9     Patient Active Problem List   Diagnosis   • Localized edema   • Hilar mass   • Morbid obesity ([facility])   • Chronic cellulitis   • Lymphedema   • Chronic venous stasis   • Cellulitis of right lower extremity     Past Medical History:   Diagnosis Date   • Chronic cellulitis    • Chronic venous stasis    • COPD (chronic obstructive pulmonary disease) ([facility])    • GERD (gastroesophageal reflux disease)    • Hyperlipidemia    • Hypertension    • Hypothyroidism    • Lymphedema    • Morbid obesity ([facility])    • Osteoarthritis      Past Surgical History:   Procedure Laterality Date   • HERNIA REPAIR Right      PT Assessment (last 12 hours)     PT Evaluation and Treatment     Row Name 22 1308          Physical Therapy Time and Intention    Subjective Information complains of;pain  Both ankles and tops of feet.  -TW     Document Type therapy note (daily note)  -TW     Mode of Treatment physical therapy;individual therapy  -TW     Patient Effort good  -TW     Symptoms Noted During/After Treatment shortness of breath  -TW     Comment Nsg and pt agreeable to tx this date.  -TW     Row Name 22 1307          General Information    Patient Profile Reviewed yes  -TW     Patient Observations alert;cooperative;agree to therapy  -TW     Patient/Family/Caregiver Comments/Observations none  -TW     General Observations of Patient Pt standing in bathroom upon PT entrance into room.  -TW     Existing Precautions/Restrictions fall  -TW     Row  Name 03/07/22 1308          Pain    Pretreatment Pain Rating 9/10  -TW     Posttreatment Pain Rating 9/10  -TW     Pain Location - Side/Orientation Bilateral  -TW     Pain Location - ankle;foot  -TW     Row Name 03/07/22 1308          Cognition    Affect/Mental Status (Cognition) WFL  -TW     Orientation Status (Cognition) oriented x 4  -TW     Follows Commands (Cognition) WFL  -TW     Cognitive Function WFL  -TW     Personal Safety Interventions fall prevention program maintained;gait belt;muscle strengthening facilitated;nonskid shoes/slippers when out of bed  -TW     Row Name 03/07/22 1308          Bed Mobility    Comment, (Bed Mobility) Not tested. Pt in bathroom upon entry.  -TW     Row Name 03/07/22 1308          Transfers    Sit-Stand [place] (Transfers) supervision  -TW     Stand-Sit [place] (Transfers) supervision  -TW     Row Name 03/07/22 1308          Sit-Stand Transfer    Assistive Device (Sit-Stand Transfers) walker, front-wheeled  -TW     Row Name 03/07/22 1308          Stand-Sit Transfer    Assistive Device (Stand-Sit Transfers) walker, 4-wheeled  -TW     Row Name 03/07/22 1308          Gait/Stairs (Locomotion)    [place] Level (Gait) modified independence  -TW     Assistive Device (Gait) walker, front-wheeled  -TW     Distance in Feet (Gait) 112 ft  -TW     Deviations/Abnormal Patterns (Gait) other (see comments)  increased toe out, exaggerated side-side weight shift, no toe off noted.  -TW     Row Name 03/07/22 1308          Motor Skills    Therapeutic Exercise hip;knee;ankle  -TW     Row Name 03/07/22 1308          Hip (Therapeutic Exercise)    Hip (Therapeutic Exercise) strengthening exercise  -TW     Hip Strengthening (Therapeutic Exercise) bilateral;mini squats;marching while standing  -TW     Row Name 03/07/22 1308          Knee (Therapeutic Exercise)    Knee (Therapeutic Exercise) strengthening exercise  -TW     Knee Strengthening (Therapeutic Exercise) bilateral;marching while  standing  -TW     Row Name 03/07/22 1308          Ankle (Therapeutic Exercise)    Ankle (Therapeutic Exercise) AROM (active range of motion);strengthening exercise  -TW     Ankle AROM (Therapeutic Exercise) bilateral;sitting;standing  Pt attempted standing calf raises. Lots of crepitus was noted as well as c/o pain. Due to this pt stopped this exercise in standing and did seated ankle pumps.  -TW     Row Name             Wound 07/16/21 1445 Right leg     Wound - Properties Group Placement Date: 07/16/21  -RG Placement Time: 1445  -RG Present on Hospital Admission: Y  -RG Side: Right  -RG Location: leg  -RG Primary Wound Type: --  -RG, EDEMA/LYMPHEDEMA      Retired Wound - Properties Group Placement Date: 07/16/21  -RG Placement Time: 1445  -RG Present on Hospital Admission: Y  -RG Side: Right  -RG Location: leg  -RG Primary Wound Type: --  -RG, EDEMA/LYMPHEDEMA      Retired Wound - Properties Group Date first assessed: 07/16/21  -RG Time first assessed: 1445  -RG Present on Hospital Admission: Y  -RG Side: Right  -RG Location: leg  -RG Primary Wound Type: --  -RG, EDEMA/LYMPHEDEMA      Row Name             Wound 07/16/21 1445 Left leg     Wound - Properties Group Placement Date: 07/16/21  -RG Placement Time: 1445  -RG Present on Hospital Admission: Y  -RG Side: Left  -RG Location: leg  -RG Primary Wound Type: --  -RG, EDEMA/ LYMPHEDEMA      Retired Wound - Properties Group Placement Date: 07/16/21  -RG Placement Time: 1445  -RG Present on Hospital Admission: Y  -RG Side: Left  -RG Location: leg  -RG Primary Wound Type: --  -RG, EDEMA/ LYMPHEDEMA      Retired Wound - Properties Group Date first assessed: 07/16/21  -RG Time first assessed: 1445  -RG Present on Hospital Admission: Y  -RG Side: Left  -RG Location: leg  -RG Primary Wound Type: --  -RG, EDEMA/ LYMPHEDEMA      Row Name 03/07/22 1308          Coping    Observed Emotional State calm;cooperative  -TW     Verbalized Emotional State acceptance  -TW     Row Name  03/07/22 1308          Plan of Care Review    Plan of Care Reviewed With patient  -TW     Progress improving  -TW     Outcome Evaluation Pt standing in bathroom upon entry. Pt was agreeable to tx this date. Pt was able to ambulate mod I for 112 ft with two standing rest breaks and c/o being SOA, however O2 stats stayed in 95-96%. Pt was able to complete standing hip/knee ther ex 10x1. Pt attempted standing calf raises, lots of crepitus noted and c/o pain, pt returned to chair and finished out ankle pumps sitting w/o complaints. Pt will con't to benefit from PT.  -TW     Row Name 03/07/22 1308          Vital Signs    Pre Systolic BP Rehab 158  -TW     Pre Treatment Diastolic BP 76  -TW     Pretreatment Heart Rate (beats/min) 72  -TW     Intratreatment Heart Rate (beats/min) 83  -TW     Posttreatment Heart Rate (beats/min) 84  -TW     Pre SpO2 (%) 96  -TW     O2 Delivery Pre Treatment room air  -TW     Intra SpO2 (%) 95  -TW     O2 Delivery Intra Treatment room air  -TW     Post SpO2 (%) 95  -TW     Pre Patient Position Standing  -TW     Intra Patient Position Standing  -TW     Post Patient Position Sitting  -TW     Row Name 03/07/22 1308          Positioning and Restraints    Pre-Treatment Position standing in room  in bathroom.  -TW     Post Treatment Position chair  -TW     In Chair sitting;call light within reach;encouraged to call for assist  -TW     Row Name 03/07/22 1308          Therapy Assessment/Plan (PT)    Rehab Potential (PT) good, to achieve stated therapy goals  -TW     Row Name 03/07/22 1308          Bed Mobility Goal 1 (PT)    Activity/Assistive Device (Bed Mobility Goal 1, PT) bed mobility activities, all  -TW     [place] Level/Cues Needed (Bed Mobility Goal 1, PT) modified independence;independent  -TW     Time Frame (Bed Mobility Goal 1, PT) 3 days  -TW     Progress/Outcomes (Bed Mobility Goal 1, PT) goal ongoing  -TW     Row Name 03/07/22 1308          Transfer Goal 1 (PT)     Activity/Assistive Device (Transfer Goal 1, PT) bed-to-chair/chair-to-bed;toilet  -TW     [place] Level/Cues Needed (Transfer Goal 1, PT) modified independence  -TW     Time Frame (Transfer Goal 1, PT) 3 days  -TW     Progress/Outcome (Transfer Goal 1, PT) goal partially met  -TW     Row Name 03/07/22 1308          Gait Training Goal 1 (PT)    Activity/Assistive Device (Gait Training Goal 1, PT) assistive device use;gait (walking locomotion)  -TW     [place] Level (Gait Training Goal 1, PT) modified independence  -TW     Distance (Gait Training Goal 1, PT) 500 ft or more w/ VSS  -TW     Time Frame (Gait Training Goal 1, PT) 5 days  -TW     Progress/Outcome (Gait Training Goal 1, PT) goal not met  -TW     Row Name 03/07/22 1308          Stairs Goal 1 (PT)    Activity/Assistive Device (Stairs Goal 1, PT) ascending stairs;descending stairs  -TW     [place] Level/Cues Needed (Stairs Goal 1, PT) modified independence;supervision required  -TW     Number of Stairs (Stairs Goal 1, PT) 1 or more if he is willing  -TW     Time Frame (Stairs Goal 1, PT) 4 days  -TW     Progress/Outcome (Stairs Goal 1, PT) goal not met  -TW     Row Name 03/07/22 1308          Patient Education Goal (PT)    Activity (Patient Education Goal, PT) demonstrates PLB and stacked breathing post activity for recovery breathing indep  -TW     [place]/Cues/Accuracy (Memory Goal 2, PT) demonstrates adequately  -TW     Time Frame (Patient Education Goal, PT) 3 days  -TW           User Key  (r) = Recorded By, (t) = Taken By, (c) = Cosigned By    Initials Name Provider Type    Aidee Hwang RN Registered Nurse    Kaushik aGndhi PTA Physical Therapy Assistant                Physical Therapy Education                 Title: PT OT SLP Therapies (In Progress)     Topic: Physical Therapy (Done)     Point: Mobility training (Done)     Learning Progress Summary           Patient Acceptance, E,D, DU,VU,NR by TRINI at 3/6/2022 1128     Comment: POC: stacked breathing and PLB for recovery post dariusz/activity                   Point: Home exercise program (Done)     Learning Progress Summary           Patient Acceptance, E,D, DU,VU,NR by  at 3/6/2022 1735    Comment: POC: stacked breathing and PLB for recovery post dariusz/activity                   Point: Precautions (Done)     Learning Progress Summary           Patient Acceptance, E,D, DU,VU,NR by  at 3/6/2022 1735    Comment: POC: stacked breathing and PLB for recovery post dariusz/activity                               User Key     Initials Effective Dates Name Provider Type Discipline     06/16/21 -  Dariusz Schulz, PT Physical Therapist PT              PT Recommendation and Plan  Anticipated Discharge Disposition (PT): home with 24/7 care, home with home health  Plan of Care Reviewed With: patient  Progress: improving  Outcome Evaluation: Pt standing in bathroom upon entry. Pt was agreeable to tx this date. Pt was able to ambulate mod I for 112 ft with two standing rest breaks and c/o being SOA, however O2 stats stayed in 95-96%. Pt was able to complete standing hip/knee ther ex 10x1. Pt attempted standing calf raises, lots of crepitus noted and c/o pain, pt returned to chair and finished out ankle pumps sitting w/o complaints. Pt will con't to benefit from PT.   Outcome Measures     Row Name 03/06/22 8708             How much help from another is currently needed...    Putting on and taking off regular lower body clothing? 2  -RB      Bathing (including washing, rinsing, and drying) 2  -RB      Toileting (which includes using toilet bed pan or urinal) 3  -RB      Putting on and taking off regular upper body clothing 4  -RB      Taking care of personal grooming (such as brushing teeth) 4  -RB      Eating meals 4  -RB      AM-PAC 6 Clicks Score (OT) 19  -RB              Functional Assessment    Outcome Measure Options AM-PAC 6 Clicks Daily Activity (OT)  -RB            User Key  (r)  = Recorded By, (t) = Taken By, (c) = Cosigned By    Initials Name Provider Type    RB Bird Baugh, OT Occupational Therapist                 Time Calculation:    PT Charges     Row Name 03/07/22 1512             Time Calculation    Start Time 1308  -TW      Stop Time 1332  -TW      Time Calculation (min) 24 min  -TW              Time Calculation- PT    Total Timed Code Minutes- PT 24 minute(s)  -TW            User Key  (r) = Recorded By, (t) = Taken By, (c) = Cosigned By    Initials Name Provider Type    TW Kaushik Keith PTA Physical Therapy Assistant              Therapy Charges for Today     Code Description Service Date Service Provider Modifiers Qty    20649692712 HC GAIT TRAINING EA 15 MIN 3/7/2022 Kaushik Keith PTA GP 1    35133287013 HC PT THER PROC EA 15 MIN 3/7/2022 Kaushik Keith PTA GP 1          PT G-Codes  Outcome Measure Options: AM-PAC 6 Clicks Basic Mobility (PT)  AM-PAC 6 Clicks Score (PT): 18  AM-PAC 6 Clicks Score (OT): 19    Kaushik Keith PTA  3/7/2022

## 2022-03-07 NOTE — DISCHARGE SUMMARY
[facility] Medicine Services  DISCHARGE SUMMARY       Date of Admission: 2/26/2022  Date of Discharge:  3/7/2022  Primary Care Physician: Ethan Casey MD    Presenting Problem/History of Present Illness:  Cellulitis of right lower extremity [L03.115]       Final Discharge Diagnoses:  Active Hospital Problems    Diagnosis    • **Chronic cellulitis    • Cellulitis of right lower extremity    • Chronic venous stasis    • Lymphedema    • Morbid obesity (HCC)        Consults:   Consults     No orders found from 1/28/2022 to 2/27/2022.          Procedures Performed:                 Pertinent Test Results:   Lab Results (most recent)     Procedure Component Value Units Date/Time    Basic Metabolic Panel [[id number]]  (Normal) Collected: 03/07/22 0522    Specimen: Blood Updated: 03/07/22 0656     Glucose 93 mg/dL      BUN 14 mg/dL      Creatinine 0.95 mg/dL      Sodium 140 mmol/L      Potassium 4.0 mmol/L      Chloride 103 mmol/L      CO2 26.0 mmol/L      Calcium 9.1 mg/dL      BUN/Creatinine Ratio 14.7     Anion Gap 11.0 mmol/L      eGFR 97.5 mL/min/1.73      Comment: National Kidney Foundation and American Society of Nephrology (ASN) Task Force recommended calculation based on the Chronic Kidney Disease Epidemiology Collaboration (CKD-EPI) equation refit without adjustment for race.       Narrative:      GFR Normal >60  Chronic Kidney Disease <60  Kidney Failure <15      CBC & Differential [[id number]]  (Abnormal) Collected: 03/07/22 0522    Specimen: Blood Updated: 03/07/22 0637    Narrative:      The following orders were created for panel order CBC & Differential.  Procedure                               Abnormality         Status                     ---------                               -----------         ------                     CBC Auto Differential[[id number]]        Abnormal            Final result                 Please view results for these tests on the  individual orders.    CBC Auto Differential [[id number]]  (Abnormal) Collected: 03/07/22 0522    Specimen: Blood Updated: 03/07/22 0637     WBC 4.74 10*3/mm3      RBC 4.15 10*6/mm3      Hemoglobin 11.6 g/dL      Hematocrit 36.2 %      MCV 87.2 fL      MCH 28.0 pg      MCHC 32.0 g/dL      RDW 16.1 %      RDW-SD 50.4 fl      MPV 9.8 fL      Platelets 212 10*3/mm3      Neutrophil % 45.1 %      Lymphocyte % 41.8 %      Monocyte % 8.9 %      Eosinophil % 3.6 %      Basophil % 0.4 %      Immature Grans % 0.2 %      Neutrophils, Absolute 2.14 10*3/mm3      Lymphocytes, Absolute 1.98 10*3/mm3      Monocytes, Absolute 0.42 10*3/mm3      Eosinophils, Absolute 0.17 10*3/mm3      Basophils, Absolute 0.02 10*3/mm3      Immature Grans, Absolute 0.01 10*3/mm3      nRBC 0.0 /100 WBC     Basic Metabolic Panel [[id number]]  (Abnormal) Collected: 03/06/22 0515    Specimen: Blood Updated: 03/06/22 0627     Glucose 155 mg/dL      BUN 15 mg/dL      Creatinine 1.11 mg/dL      Sodium 137 mmol/L      Potassium 4.4 mmol/L      Chloride 102 mmol/L      CO2 25.0 mmol/L      Calcium 9.3 mg/dL      BUN/Creatinine Ratio 13.5     Anion Gap 10.0 mmol/L      eGFR 80.9 mL/min/1.73      Comment: National Kidney Foundation and American Society of Nephrology (ASN) Task Force recommended calculation based on the Chronic Kidney Disease Epidemiology Collaboration (CKD-EPI) equation refit without adjustment for race.       Narrative:      GFR Normal >60  Chronic Kidney Disease <60  Kidney Failure <15      CBC & Differential [[id number]]  (Abnormal) Collected: 03/06/22 0515    Specimen: Blood Updated: 03/06/22 0603    Narrative:      The following orders were created for panel order CBC & Differential.  Procedure                               Abnormality         Status                     ---------                               -----------         ------                     CBC Auto Differential[[id number]]        Abnormal            Final result                  Please view results for these tests on the individual orders.    CBC Auto Differential [[id number]]  (Abnormal) Collected: 03/06/22 0515    Specimen: Blood Updated: 03/06/22 0603     WBC 4.30 10*3/mm3      RBC 4.19 10*6/mm3      Hemoglobin 11.7 g/dL      Hematocrit 36.1 %      MCV 86.2 fL      MCH 27.9 pg      MCHC 32.4 g/dL      RDW 15.7 %      RDW-SD 49.4 fl      MPV 9.9 fL      Platelets 232 10*3/mm3      Neutrophil % 80.9 %      Lymphocyte % 16.5 %      Monocyte % 1.9 %      Eosinophil % 0.0 %      Basophil % 0.2 %      Immature Grans % 0.5 %      Neutrophils, Absolute 3.48 10*3/mm3      Lymphocytes, Absolute 0.71 10*3/mm3      Monocytes, Absolute 0.08 10*3/mm3      Eosinophils, Absolute 0.00 10*3/mm3      Basophils, Absolute 0.01 10*3/mm3      Immature Grans, Absolute 0.02 10*3/mm3      nRBC 0.0 /100 WBC     Blood Culture - Blood, Arm, Left [[id number]]  (Normal) Collected: 02/26/22 1630    Specimen: Blood from Arm, Left Updated: 03/03/22 1645     Blood Culture No growth at 5 days    Blood Culture - Blood, Arm, Right [[id number]]  (Normal) Collected: 02/26/22 1523    Specimen: Blood from Arm, Right Updated: 03/03/22 1530     Blood Culture No growth at 5 days    Vancomycin, Trough [[id number]]  (Abnormal) Collected: 02/28/22 0532    Specimen: Blood Updated: 02/28/22 0614     Vancomycin Trough 21.20 mcg/mL     Vancomycin, Peak [[id number]]  (Abnormal) Collected: 02/27/22 2116    Specimen: Blood Updated: 02/27/22 2221     Vancomycin Peak 47.40 mcg/mL     Extra Tubes [[id number]] Collected: 02/26/22 1641    Specimen: Blood, Venous Line Updated: 02/26/22 2045    Narrative:      The following orders were created for panel order Extra Tubes.  Procedure                               Abnormality         Status                     ---------                               -----------         ------                     Lavender Top[[id number]]                                     Final result               Green Top  (Gel)[[id number]]                                  Final result               Sanderson Top[[id number]]                                         Final result               Light Blue Top[[id number]]                                                                Please view results for these tests on the individual orders.    Gray Top [[id number]] Collected: 02/26/22 1641    Specimen: Blood Updated: 02/26/22 2045     Extra Tube Hold for add-ons.     Comment: Auto resulted.       Lavender Top [[id number]] Collected: 02/26/22 1642    Specimen: Blood Updated: 02/26/22 1745     Extra Tube hold for add-on     Comment: Auto resulted       Green Top (Gel) [[id number]] Collected: 02/26/22 1642    Specimen: Blood Updated: 02/26/22 1745     Extra Tube Hold for add-ons.     Comment: Auto resulted.       Extra Tubes [[id number]] Collected: 02/26/22 1525    Specimen: Blood, Venous Line Updated: 02/26/22 1630    Narrative:      The following orders were created for panel order Extra Tubes.  Procedure                               Abnormality         Status                     ---------                               -----------         ------                     Gold Top - SST[[id number]]                                   Final result               Light Blue Top[[id number]]                                   Final result                 Please view results for these tests on the individual orders.    Gold Top - SST [[id number]] Collected: 02/26/22 1525    Specimen: Blood Updated: 02/26/22 1630     Extra Tube Hold for add-ons.     Comment: Auto resulted.       Light Blue Top [[id number]] Collected: 02/26/22 1525    Specimen: Blood Updated: 02/26/22 1630     Extra Tube hold for add-on     Comment: Auto resulted       COVID-19 and FLU A/B PCR - Swab, Nasopharynx [[id number]]  (Normal) Collected: 02/26/22 1558    Specimen: Swab from Nasopharynx Updated: 02/26/22 1626     COVID19 Not Detected     Influenza A PCR Not Detected     Influenza B PCR Not  Detected    Narrative:      Fact sheet for providers: https://www.fda.gov/media/921357/download    Fact sheet for patients: https://www.fda.gov/media/316547/download    Test performed by PCR.    Comprehensive Metabolic Panel [[id number]]  (Abnormal) Collected: 02/26/22 1523    Specimen: Blood Updated: 02/26/22 1548     Glucose 107 mg/dL      BUN 10 mg/dL      Creatinine 0.82 mg/dL      Sodium 142 mmol/L      Potassium 3.6 mmol/L      Chloride 107 mmol/L      CO2 26.0 mmol/L      Calcium 9.2 mg/dL      Total Protein 6.9 g/dL      Albumin 4.00 g/dL      ALT (SGPT) 33 U/L      AST (SGOT) 24 U/L      Alkaline Phosphatase 90 U/L      Total Bilirubin 0.5 mg/dL      eGFR Non African Amer 99 mL/min/1.73      Globulin 2.9 gm/dL      A/G Ratio 1.4 g/dL      BUN/Creatinine Ratio 12.2     Anion Gap 9.0 mmol/L     Narrative:      GFR Normal >60  Chronic Kidney Disease <60  Kidney Failure <15      CK [[id number]]  (Abnormal) Collected: 02/26/22 1523    Specimen: Blood Updated: 02/26/22 1548     Creatine Kinase 253 U/L     Lactic Acid, Plasma [[id number]]  (Normal) Collected: 02/26/22 1523    Specimen: Blood Updated: 02/26/22 1545     Lactate 1.3 mmol/L         Imaging Results (Most Recent)     Procedure Component Value Units Date/Time    XR Chest 1 View [[id number]] Collected: 03/05/22 1005     Updated: 03/05/22 1219    Narrative:        PORTABLE CHEST    HISTORY: Shortness of air. Cellulitis right lower extremity.    Portable AP upright film of the chest was obtained at 10:08 AM.  COMPARISON: Most recent February 26, 2022    FINDINGS:   EKG leads.  Chronic obstructive pulmonary disease.  Linear atelectasis left lung base.  Minimal cardiomegaly.  The pulmonary vasculature is not increased.  No pleural effusion.  No pneumothorax.  No acute osseous abnormality.  Degenerative changes are present in the thoracic spine.      Impression:      CONCLUSION:  Chronic obstructive pulmonary disease.  Linear atelectasis left lung base.  Minimal  cardiomegaly.    [id number]    Electronically signed by:  Arnulfo Watkins MD  3/5/2022 12:17 PM CST  Workstation: [id number]    XR Chest 1 View [[id number]] Collected: 02/26/22 1533     Updated: 02/26/22 1644    Narrative:      EXAM: XR CHEST 1 VIEW    HISTORY: shortness of breath    COMPARISON: 9/13/2017    TECHNIQUE:   Portable view of the chest.    FINDINGS:   The lungs are well aerated. There is no pleural effusion. The  heart size is within normal limits. The mediastinal contours are  within normal limits. .  No evidence of focal consolidation. Unremarkable bronchovascular  markings. Unremarkable bilateral hemidiaphragms.  Unremarkable visualized osseous structures.      Impression:      1.  There is no acute cardiopulmonary disease.          Electronically signed by:  Chema Lopez MD  2/26/2022 4:42 PM CST  Workstation: [id number]          Chief Complaint on Day of Discharge: Cellulitis bilateral lower extremities    Hospital Course:  The patient is a 51 y.o. male who presented to [facility] with complaint of bilateral lower extremity cellulitis.  Patient is admitted to the facility on 2/26/2022.  He is discharged to home in stable condition 3/7/2022.    Final diagnosis  Chronic bilateral lower extremity cellulitis  Lymphedema  Chronic venous stasis  Morbid obesity BMI 42.75    History  The patient is a 51-year-old male who comes to the ER for evaluation after being noted with bilateral lower extremity cellulitis.  The patient was treated outpatient with doxycycline and Levaquin.  The patient has been seeing wound care once a week.  He noticed a red blood been any improvements and was becoming worse over the last couple days.  In the ER blood cultures and wound cultures were obtained.  The patient was on IV vancomycin.  Wound care was consulted.  The patient was continued on multiple antibiotics throughout his stay.  He finally completed a course of Zyvox along with vancomycin and will still improve  "slowly.  Patient was seen by physical therapy and Occupational Therapy as well during his stay.  He progressed nicely wounds are stable.  Edema is significantly improved.  The patient had an episode where there was a possibility that he might have a allergic reaction to Lasix.  Steroids were started Lasix was discontinued.  Patient did improve.  Requested a trial of Lasix once again.  Lasix was restarted without incident.  Wound care saw the patient and recommended wound care as noted in the chart.  On day of discharge the patient is doing much better.  His vital signs are stable he is afebrile.  He will be discharged home on medications as noted above.  His activity is as tolerated.  Diet is regular.  He is instructed to follow-up with his primary care provider within 1 week of discharge.  The patient will be seen by home health for wound care dressing.  He will also be seen by PT and OT as outpatient as well.    Labs on day of discharge metabolic profile was completely normal.  White count is 4.74 H&H is 11.6 and 36.2 platelets are 212.  During his stay blood cultures were completed which were negative.    Condition on Discharge:  Stable     Physical Exam on Discharge:  /53 (BP Location: Right arm, Patient Position: Sitting)   Pulse 67   Temp 97.5 °F (36.4 °C) (Tympanic)   Resp 18   Ht 190.5 cm (75\")   Wt (!) 155 kg (342 lb)   SpO2 94%   BMI 42.75 kg/m²   Physical Exam  Vitals and nursing note reviewed.   Constitutional:       Appearance: Normal appearance.   HENT:      Head: Normocephalic and atraumatic.      Right Ear: External ear normal.      Left Ear: External ear normal.      Nose: Nose normal.      Mouth/Throat:      Mouth: Mucous membranes are moist.      Pharynx: Oropharynx is clear.   Eyes:      Extraocular Movements: Extraocular movements intact.      Conjunctiva/sclera: Conjunctivae normal.      Pupils: Pupils are equal, round, and reactive to light.   Cardiovascular:      Rate and Rhythm: " Normal rate and regular rhythm.      Pulses: Normal pulses.      Heart sounds: Normal heart sounds.   Pulmonary:      Effort: Pulmonary effort is normal.      Breath sounds: Normal breath sounds.   Abdominal:      General: Bowel sounds are normal.      Palpations: Abdomen is soft.   Musculoskeletal:      Cervical back: Normal range of motion and neck supple.      Right lower leg: Edema present.      Left lower leg: Edema present.   Skin:     General: Skin is warm and dry.      Findings: Erythema present.   Neurological:      General: No focal deficit present.      Mental Status: He is alert and oriented to person, place, and time.   Psychiatric:         Mood and Affect: Mood normal.         Behavior: Behavior normal.           [place] [facility] [place] Medications:     Discharge Medications      New Medications      Instructions Start Date   bacitracin 500 UNIT/GM ointment   1 application, Topical, Every 12 Hours Scheduled      Bag Balm ointment ointment   1 application, Topical, Daily   Start Date: March 8, 2022     furosemide 40 MG tablet  Commonly known as: LASIX   40 mg, Oral, Daily   Start Date: March 8, 2022     potassium chloride 20 MEQ packet  Commonly known as: KLOR-CON   20 mEq, Oral, Daily   Start Date: March 8, 2022     predniSONE 20 MG tablet  Commonly known as: DELTASONE   20 mg, Oral, Daily With Breakfast   Start Date: March 8, 2022        Continue These Medications      Instructions Start Date   IBUPROFEN PO   800 mg, Oral, 4 Times Daily PRN      PRILOSEC PO   20 mg, Oral, Daily PRN             Discharge Diet:   Diet Instructions     Diet: Regular      Discharge Diet: Regular    Fluid Restriction per day: 1500 mL Fluid          Activity at Discharge:   Activity Instructions     Activity as Tolerated            Discharge Care Plan/Instructions: Continue home medications as noted above.  Continue to follow-up with primary care provider within 1 week of discharge.  [facility]  to provide services.    Follow-up Appointments:   No future appointments.    Test Results Pending at Discharge:     The patient has current or prior documentation of LVEF less than 40%, or moderate to severely depressed left ventricular systolic function. The patient was prescribed or already taking a beta-blocker.      The patient has current or prior documentation of LVEF less than 40%, or moderate to severely depressed left ventricular systolic function. The patent was prescribed or already taking an ACE inhibitor or ARB.     Saturnino Alcaraz DO    Time: Time to complete discharge greater than 30 minutes.

## 2022-03-07 NOTE — DISCHARGE INSTRUCTIONS
Wound care- 1. cleanse area with soap and water, pat dry with sterile 4x4.                         2. Assess wound color, temp, appearance and edges.                        3. Assess drainage color and odor, report to health care if any changes.                        4. Apply polymem to wound and wrap with 6 inch ace wraps                        5. Wrap from toes to below bend of knee.                        6. Assess circulation and color of extremity and report changes to health care provider.

## 2022-03-07 NOTE — PLAN OF CARE
Goal Outcome Evaluation:  Plan of Care Reviewed With: patient        Progress: improving  Outcome Evaluation: Pt standing in bathroom upon entry. Pt was agreeable to tx this date. Pt was able to ambulate mod I for 112 ft with two standing rest breaks and c/o being SOA, however O2 stats stayed in 95-96%. Pt was able to complete standing hip/knee ther ex 10x1. Pt attempted standing calf raises, lots of crepitus noted and c/o pain, pt returned to chair and finished out ankle pumps sitting w/o complaints. Pt will con't to benefit from PT.

## 2022-03-07 NOTE — EXTERNAL PATIENT INSTRUCTIONS
Patient Education   Table of Contents       Cellulitis, Adult       Furosemide Oral Tablets     To view videos and all your education online visit,   [email or web address]   or scan this QR code with your smartphone.                  Cellulitis, Adult        Cellulitis is a skin infection. The infected area is usually warm, red, swollen, and tender. This condition occurs most often in the arms and lower legs. The infection can travel to the muscles, blood, and underlying tissue and become serious. It is very important to get treated for this condition.     What are the causes?   Cellulitis is caused by bacteria. The bacteria enter through a break in the skin, such as a cut, burn, insect bite, open sore, or crack.   What increases the risk?    This condition is more likely to occur in people who:       Have a weak body defense system (immune system).       Have open wounds on the skin, such as cuts, burns, bites, and scrapes. Bacteria can enter the body through these open wounds.       Are older than 60 years of age.       Have diabetes.       Have a type of long-lasting (chronic) liver disease (cirrhosis) or kidney disease.       Are obese.      Have a skin condition such as:       Itchy rash (eczema).       Slow movement of blood in the veins (venous stasis).       Fluid buildup below the skin (edema).       Have had radiation therapy.       Use IV drugs.     What are the signs or symptoms?    Symptoms of this condition include:       Redness, streaking, or spotting on the skin.       Swollen area of the skin.       Tenderness or pain when an area of the skin is touched.       Warm skin.       A fever.       Chills.       Blisters.     How is this diagnosed?    This condition is diagnosed based on a medical history and physical exam. You may also have tests, including:       Blood tests.       Imaging tests.     How is this treated?    Treatment for this condition may include:       Medicines, such as  antibiotic medicines or medicines to treat allergies (antihistamines).       Supportive care, such as rest and application of cold or warm cloths (compresses) to the skin.       Hospital care, if the condition is severe.     The infection usually starts to get better within 1?2 days of treatment.   Follow these instructions at home:      Medicines         Take over-the-counter and prescription medicines only as told by your health care provider.       If you were prescribed an antibiotic medicine, take it as told by your health care provider. Do not  stop taking the antibiotic even if you start to feel better.     General instructions         Drink enough fluid to keep your urine pale yellow.      Do not  touch or rub the infected area.       Raise (elevate) the infected area above the level of your heart while you are sitting or lying down.       Apply warm or cold compresses to the affected area as told by your health care provider.       Keep all follow-up visits as told by your health care provider. This is important. These visits let your health care provider make sure a more serious infection is not developing.       Contact a health care provider if:         You have a fever.       Your symptoms do not begin to improve within 1?2 days of starting treatment.       Your bone or joint underneath the infected area becomes painful after the skin has healed.       Your infection returns in the same area or another area.       You notice a swollen bump in the infected area.       You develop new symptoms.       You have a general ill feeling (malaise) with muscle aches and pains.     Get help right away if:         Your symptoms get worse.       You feel very sleepy.       You develop vomiting or diarrhea that persists.       You notice red streaks coming from the infected area.       Your red area gets larger or turns dark in color.     These symptoms may represent a serious problem that is an emergency. Do not  wait to see if the symptoms will go away. Get medical help right away. Call your local emergency services (911 in the [place] Do not drive yourself to the hospital.   Summary         Cellulitis is a skin infection. This condition occurs most often in the arms and lower legs.       Treatment for this condition may include medicines, such as antibiotic medicines or antihistamines.       Take over-the-counter and prescription medicines only as told by your health care provider. If you were prescribed an antibiotic medicine, do not  stop taking the antibiotic even if you start to feel better.       Contact a health care provider if your symptoms do not begin to improve within 1?2 days of starting treatment or your symptoms get worse.       Keep all follow-up visits as told by your health care provider. This is important. These visits let your health care provider make sure that a more serious infection is not developing.     This information is not intended to replace advice given to you by your health care provider. Make sure you discuss any questions you have with your health care provider.     Document Released: 09/27/2006Document Revised: 12/28/2020Document Reviewed: 05/09/2019     [employer] Patient Education ? 2021 [employer]         Furosemide Oral Tablets     What is this medicine?   FUROSEMIDE (fyoor [place] se mide) is a diuretic. It helps you make more urine and to lose salt and excess water from your body. It treats swelling from heart, kidney, or liver disease. It also treats high blood pressure.   This medicine may be used for other purposes; ask your health care provider or pharmacist if you have questions.   COMMON BRAND NAME(S): Active-Medicated Specimen Kit, Delone, Diuscreen, Lasix, RX Specimen Collection Kit, Specimen Collection Kit, URINX Medicated Specimen Collection   What should I tell my health care provider before I take this medicine?   They need to know if you have any of these conditions:          abnormal blood electrolytes       diarrhea or vomiting       gout       heart disease       kidney disease, small amounts of urine, or difficulty passing urine       liver disease       thyroid disease       an unusual or allergic reaction to furosemide, sulfa drugs, other medicines, foods, dyes, or preservatives       pregnant or trying to get pregnant       breast-feeding     How should I use this medicine?   Take this drug by mouth. Take it as directed on the prescription label at the same time every day. You can take it with or without food. If it upsets your stomach, take it with food. Keep taking it unless your health care provider tells you to stop.   Talk to your health care provider about the use of this drug in children. Special care may be needed.   Overdosage: If you think you have taken too much of this medicine contact a poison control center or emergency room at once.   NOTE: This medicine is only for you. Do not share this medicine with others.   What if I miss a dose?   If you miss a dose, take it as soon as you can. If it is almost time for your next dose, take only that dose. Do not take double or extra doses.   What may interact with this medicine?         aspirin and aspirin-like medicines       certain antibiotics       chloral hydrate       cisplatin       cyclosporine       digoxin       diuretics       laxatives       lithium       medicines for blood pressure       medicines that relax muscles for surgery       methotrexate       NSAIDs, medicines for pain and inflammation like ibuprofen, naproxen, or indomethacin       phenytoin       steroid medicines like prednisone or cortisone       sucralfate       thyroid hormones     This list may not describe all possible interactions. Give your health care provider a list of all the medicines, herbs, non-prescription drugs, or dietary supplements you use. Also tell them if you smoke, drink alcohol, or use illegal drugs. Some items may interact  with your medicine.   What should I watch for while using this medicine?   Visit your doctor or health care providerfor regular checks on your progress. Check your blood pressure regularly. Ask your doctor or health care providerwhat your blood pressure should be, and when you should contact him or her. If you are a diabetic, check your blood sugar as directed.   This medicine may cause serious skin reactions. They can happen weeks to months after starting the medicine. Contact your health care provider right away if you notice fevers or flu-like symptoms with a rash. The rash may be red or purple and then turn into blisters or peeling of the skin. Or, you might notice a red rash with swelling of the face, lips or lymph nodes in your neck or under your arms.   You may need to be on a special diet while taking this medicine. Check with your doctor. Also, ask how many glasses of fluid you need to drink a day. You must not get dehydrated.   You may get drowsy or dizzy. Do not drive, use machinery, or do anything that needs mental alertness until you know how this drug affects you. Do not stand or sit up quickly, especially if you are an older patient. This reduces the risk of dizzy or fainting spells. Alcohol can make you more drowsy and dizzy. Avoid alcoholic drinks.   This medicine can make you more sensitive to the sun. Keep out of the sun. If you cannot avoid being in the sun, wear protective clothing and use sunscreen. Do not use sun lamps or tanning beds/booths.   What side effects may I notice from receiving this medicine?   Side effects that you should report to your doctor or health care professional as soon as possible:         blood in urine or stools       dry mouth       fever or chills       hearing loss or ringing in the ears       irregular heartbeat       muscle pain or weakness, cramps       rash, fever, and swollen lymph nodes       redness, blistering, peeling or loosening of the skin, including  inside the mouth       skin rash       stomach upset, pain, or nausea       tingling or numbness in the hands or feet       unusually weak or tired       vomiting or diarrhea       yellowing of the eyes or skin     Side effects that usually do not require medical attention (report to your doctor or health care professional if they continue or are bothersome):         headache       loss of appetite       unusual bleeding or bruising     This list may not describe all possible side effects. Call your doctor for medical advice about side effects. You may report side effects to FDA at 8-708-TJI-4311.   Where should I keep my medicine?   Keep out of the reach of children and pets.   Store at room temperature between 20 and 25 degrees C (68 and 77 degrees F). Protect from light and moisture. Keep the container tightly closed. Throw away any unused drug after the expiration date.   NOTE: This sheet is a summary. It may not cover all possible information. If you have questions about this medicine, talk to your doctor, pharmacist, or health care provider.     ? 2021 Elsevier/[employer] Standard (2020-08-04 18:01:32)

## 2022-03-07 NOTE — EXTERNAL PATIENT INSTRUCTIONS
Patient Education   Table of Contents       Sodium Sulfate; Magnesium Sulfate; Potassium Chloride Oral Tablets     To view videos and all your education online visit,   [email or web address]   or scan this QR code with your smartphone.                  Sodium Sulfate; Magnesium Sulfate; Potassium Chloride Oral Tablets     What is this medicine?   SODIUM SULFATE; MAGNESIUM SULFATE; POTASSIUM CHLORIDE (SODIUM SUL fate; mag NEE zee um SUL fate; moncho TASS i um klor fernando) is a laxative. It is used to clean out the bowel before a colonoscopy.   This medicine may be used for other purposes; ask your health care provider or pharmacist if you have questions.   COMMON BRAND NAME(S): SUTAB   What should I tell my health care provider before I take this medicine?   They need to know if you have any of these conditions:         dehydration       diet low in salt       difficulty swallowing       gout       heart disease       high or low levels of calcium, magnesium, potassium, or sodium in the blood       irregular heartbeat       kidney disease       seizures       stomach or intestine problems       withdrawing from drinking alcohol or from taking benzodiazepines       an unusual or allergic reaction to sodium sulfate; magnesium sulfate; potassium chloride, other medicines, foods, dyes, or preservatives       pregnant or trying to get pregnant       breast-feeding     How should I use this medicine?   Take this medicine by mouth. Follow the directions on the prescription label. Your doctor will tell you what time to start this medicine. Take exactly as directed. Do not skip a dose or stop your medicine early.   This drug comes with INSTRUCTIONS FOR USE. Ask your pharmacist for directions on how to use this drug. Read the information carefully. Talk to your pharmacist or health care provider if you have questions.   Take other drugs that you take by mouth at a different time of day than this drug. Take this drug at  least 1 hour after taking your other drugs. Some drugs may need to be  by longer. Talk to your pharmacist about when to take your medicines.   You will need to follow a special diet before your procedure. Talk to your doctor. You may drink clear liquids while taking this medicine and up until 2 hours before the colonoscopy.   A special MedGuide will be given to you by the pharmacist with each prescription and refill. Be sure to read this information carefully each time.   Talk to your pediatrician regarding the use of this medicine in children. Special care may be needed.   Overdosage: If you think you have taken too much of this medicine contact a poison control center or emergency room at once.   NOTE: This medicine is only for you. Do not share this medicine with others.   What if I miss a dose?   This does not apply. This medicine is not for regular use.   What may interact with this medicine?   Do not take this medicine with any of the following medications:         other laxatives     This medicine may also interact with the following medications:         certain antibiotics like ciprofloxacin, doxycycline, levofloxacin, moxifloxacin, tetracycline       certain medicines for blood pressure, heart disease, irregular heartbeat       certain medicines for depression, anxiety, or psychotic disorders       chlorpromazine       digoxin       diuretics       iron       medicines for seizures       NSAIDs, medicines for pain and inflammation, like ibuprofen or naproxen       penicillamine     This list may not describe all possible interactions. Give your health care provider a list of all the medicines, herbs, non-prescription drugs, or dietary supplements you use. Also tell them if you smoke, drink alcohol, or use illegal drugs. Some items may interact with your medicine.   What should I watch for while using this medicine?   You will need to follow a special diet before your procedure. Talk to your health  care provider.   Talk to your health care provider if you are not able to complete the regimen as prescribed.   Do not use with any other laxatives. Drink fluids as directed to prevent dehydration. See your healthcare professional right away if you do not have a bowel movement after using this medicine.   What side effects may I notice from receiving this medicine?   Side effects that you should report to your doctor or health care professional as soon as possible:         allergic reactions like skin rash, itching or hives, swelling of the face, lips, or tongue       bloody or black, tarry stools       difficulty swallowing       heartbeat rhythm changes (trouble breathing; chest pain; dizziness; fast, irregular heartbeat; feeling faint or lightheaded, falls)       seizures       symptoms of gout (joint pain or swelling)       trouble passing urine or change in the amount of urine     Side effects that usually do not require medical attention (report these to your doctor or health care professional if they continue or are bothersome):         bloating       headache       nausea, vomiting       passing gas       stomach pain     This list may not describe all possible side effects. Call your doctor for medical advice about side effects. You may report side effects to FDA at 2-199-FDA-4976.   Where should I keep my medicine?   Keep out of the reach of children.   Store at room temperature between 15 and 30 degrees C (59 and 86 degrees F). Throw away any unused medicine after the expiration date.   NOTE: This sheet is a summary. It may not cover all possible information. If you have questions about this medicine, talk to your doctor, pharmacist, or health care provider.     ? 2021 Elsevier/Gold Standard (2020-11-16 15:46:20)

## 2022-03-07 NOTE — THERAPY TREATMENT NOTE
Patient Name: Jalil Camarillo  : 1970    MRN: [id number]                              Today's Date: 3/7/2022       Admit Date: 2022    Visit Dx:     ICD-10-CM ICD-9-CM   1. Cellulitis of right lower extremity  L03.115 682.6   2. Impaired mobility and activities of daily living  Z74.09 V49.89    Z78.9    3. Impaired functional mobility, balance, gait, and endurance  Z74.09 V49.89     Patient Active Problem List   Diagnosis   • Localized edema   • Hilar mass   • Morbid obesity (HCC)   • Chronic cellulitis   • Lymphedema   • Chronic venous stasis   • Cellulitis of right lower extremity     Past Medical History:   Diagnosis Date   • Chronic cellulitis    • Chronic venous stasis    • COPD (chronic obstructive pulmonary disease) (HCC)    • GERD (gastroesophageal reflux disease)    • Hyperlipidemia    • Hypertension    • Hypothyroidism    • Lymphedema    • Morbid obesity (HCC)    • Osteoarthritis      Past Surgical History:   Procedure Laterality Date   • HERNIA REPAIR Right       General Information     Row Name 22          OT Time and Intention    Document Type therapy note (daily note)  -KD     Mode of Treatment individual therapy;occupational therapy  -KD     Row Name 22          General Information    Patient Profile Reviewed yes  -KD     Existing Precautions/Restrictions fall  -KD     Row Name 22          Cognition    Orientation Status (Cognition) oriented x 4  -KD           User Key  (r) = Recorded By, (t) = Taken By, (c) = Cosigned By    Initials Name Provider Type    KD Sharon Lainez COTA Occupational Therapy Assistant                 Mobility/ADL's     Row Name 22          Bed Mobility    Comment, (Bed Mobility) Pt up in recliner upon entry  -KD     Row Name 22          Transfers    Sit-Stand [place] (Transfers) supervision  -KD     Row Name 22          Sit-Stand Transfer    Assistive Device (Sit-Stand Transfers) walker,  front-wheeled  -     Row Name 03/07/22 0901          Functional Mobility    Functional Mobility- Ind. Level supervision required  -     Functional Mobility- Device rolling walker  -     Functional Mobility-Distance (Feet) 77  -     Row Name 03/07/22 0901          Activities of Daily Living    BADL Assessment/Intervention grooming  -     Row Name 03/07/22 0901          Grooming Assessment/Training    [place] Level (Grooming) grooming skills;shave face;set up  -     Position (Grooming) unsupported sitting  -           User Key  (r) = Recorded By, (t) = Taken By, (c) = Cosigned By    Initials Name Provider Type    Sharon Franks COTA Occupational Therapy Assistant               Obj/Interventions     Row Name 03/07/22 0901          Shoulder (Therapeutic Exercise)    Shoulder (Therapeutic Exercise) AROM (active range of motion)  -     Shoulder AROM (Therapeutic Exercise) bilateral;flexion;extension;aBduction;aDduction;external rotation;internal rotation;horizontal aBduction/aDduction  -[facility] Name 03/07/22 0901          Elbow/Forearm (Therapeutic Exercise)    Elbow/Forearm (Therapeutic Exercise) AROM (active range of motion)  -     Elbow/Forearm AROM (Therapeutic Exercise) bilateral;flexion;extension;supination;pronation  -[facility] Name 03/07/22 0901          Wrist (Therapeutic Exercise)    Wrist (Therapeutic Exercise) AROM (active range of motion)  -     Wrist AROM (Therapeutic Exercise) bilateral;flexion;extension  -[facility] Name 03/07/22 0901          Hand (Therapeutic Exercise)    Hand (Therapeutic Exercise) AROM (active range of motion)  -     Hand AROM/AAROM (Therapeutic Exercise) bilateral;finger flexion;finger extension  -[facility] Name 03/07/22 0901          Motor Skills    Therapeutic Exercise shoulder;elbow/forearm;wrist;hand  -           User Key  (r) = Recorded By, (t) = Taken By, (c) = Cosigned By    Initials Name Provider Type    Sharon Franks COTA  Occupational Therapy Assistant               Goals/Plan     Row Name 03/07/22 0901          Transfer Goal 1 (OT)    Activity/Assistive Device (Transfer Goal 1, OT) transfers, all  -KD     [place] Level/Cues Needed (Transfer Goal 1, OT) modified independence  -KD     Time Frame (Transfer Goal 1, OT) long term goal (LTG)  -KD     Progress/Outcome (Transfer Goal 1, OT) goal not met  -KD     Row Name 03/07/22 0901          Bathing Goal 1 (OT)    Activity/Device (Bathing Goal 1, OT) bathing skills, all  -KD     [place] Level/Cues Needed (Bathing Goal 1, OT) contact guard required  -KD     Time Frame (Bathing Goal 1, OT) long term goal (LTG)  -KD     Progress/Outcomes (Bathing Goal 1, OT) goal not met  -KD     Row Name 03/07/22 0901          Dressing Goal 1 (OT)    Activity/Device (Dressing Goal 1, OT) dressing skills, all  -KD     [place]/Cues Needed (Dressing Goal 1, OT) supervision required  -KD     Time Frame (Dressing Goal 1, OT) long term goal (LTG)  -KD     Progress/Outcome (Dressing Goal 1, OT) goal not met  -KD     Row Name 03/07/22 0901           Activity Tolerance Goal 1 (OT)    Activity Level (Endurance Goal 1, OT) 20 min activity  -KD     Progress/Outcome (Activity Tolerance Goal 1, OT) goal not met  -KD           User Key  (r) = Recorded By, (t) = Taken By, (c) = Cosigned By    Initials Name Provider Type    KD Sharon Lainez COTA Occupational Therapy Assistant               Clinical Impression     Row Name 03/07/22 0901          Pain Assessment    Pretreatment Pain Rating 8/10  -KD     Posttreatment Pain Rating 8/10  -KD     Pain Location - Side/Orientation Bilateral  -KD     Pain Location - foot  -KD     Pain Intervention(s) Repositioned  -KD     Row Name 03/07/22 0901          Plan of Care Review    Plan of Care Reviewed With patient  -KD     Progress improving  -KD     Outcome Evaluation Pt up in recliner upon entry. Sit-stand-SBA. Pt completed Fxl mobility to sink w/ SBA and RW. Pt  stood ~5 mins and completed shaving activity w/ good tolerance. Pt then amb ~77' SBA w/ RW and good tolerance. Pt chepe B UE ther ex in all planes w/ 2lb HW. Pt up in recliner upon exit w/ all needs in reach. Cont OT POC.  -KD     Row Name 03/07/22 0901          Therapy Assessment/Plan (OT)    Therapy Frequency (OT) other (see comments)  3-7 days a week  -KD     Row Name 03/07/22 0901          Therapy Plan Review/Discharge Plan (OT)    Anticipated Discharge Disposition (OT) home with assist  -KD     Row Name 03/07/22 0901          Vital Signs    Pre Systolic BP Rehab 148  -KD     Pre Treatment Diastolic BP 74  -KD     Pretreatment Heart Rate (beats/min) 60  -KD     Pre SpO2 (%) 97  -KD     O2 Delivery Pre Treatment room air  -KD     Pre Patient Position Sitting  -KD     Intra Patient Position Standing  -KD     Post Patient Position Sitting  -KD     Row Name 03/07/22 0901          Positioning and Restraints    Pre-Treatment Position sitting in chair/recliner  -KD     Post Treatment Position chair  -KD     In Chair sitting;call light within reach;encouraged to call for assist;exit alarm on  -KD           User Key  (r) = Recorded By, (t) = Taken By, (c) = Cosigned By    Initials Name Provider Type    Sharon Franks COTA Occupational Therapy Assistant               Outcome Measures     Row Name 03/07/22 0901          How much help from another is currently needed...    Putting on and taking off regular lower body clothing? 2  -KD     Bathing (including washing, rinsing, and drying) 2  -KD     Toileting (which includes using toilet bed pan or urinal) 3  -KD     Putting on and taking off regular upper body clothing 4  -KD     Taking care of personal grooming (such as brushing teeth) 4  -KD     Eating meals 4  -KD     AM-PAC 6 Clicks Score (OT) 19  -KD           User Key  (r) = Recorded By, (t) = Taken By, (c) = Cosigned By    Initials Name Provider Type    Sharon Franks COTA Occupational Therapy Assistant                 Occupational Therapy Education                 Title: PT OT SLP Therapies (In Progress)     Topic: Occupational Therapy (In Progress)     Point: ADL training (Not Started)     Description:   Instruct learner(s) on proper safety adaptation and remediation techniques during self care or transfers.   Instruct in proper use of assistive devices.              Learner Progress:  Not documented in this visit.          Point: Home exercise program (Not Started)     Description:   Instruct learner(s) on appropriate technique for monitoring, assisting and/or progressing therapeutic exercises/activities.              Learner Progress:  Not documented in this visit.          Point: Precautions (Done)     Description:   Instruct learner(s) on prescribed precautions during self-care and functional transfers.              Learning Progress Summary           Patient Acceptance, E, VU by RB at 3/6/2022 1612    Comment: Edu pt on use of non skid socks when OOB.                   Point: Body mechanics (Not Started)     Description:   Instruct learner(s) on proper positioning and spine alignment during self-care, functional mobility activities and/or exercises.              Learner Progress:  Not documented in this visit.                      User Key     Initials Effective Dates Name Provider Type Discipline     06/16/21 -  Bird Baugh OT Occupational Therapist OT              OT Recommendation and Plan  Therapy Frequency (OT): other (see comments) (3-7 days a week)  Plan of Care Review  Plan of Care Reviewed With: patient  Progress: improving  Outcome Evaluation: Pt up in recliner upon entry. Sit-stand-SBA. Pt completed Fxl mobility to sink w/ SBA and RW. Pt stood ~5 mins and completed shaving activity w/ good tolerance. Pt then amb ~77' SBA w/ RW and good tolerance. Pt chepe B UE ther ex in all planes w/ 2lb HW. Pt up in recliner upon exit w/ all needs in reach. Cont OT POC.     Time Calculation:    Time Calculation- OT      Row Name 03/07/22 0901             Time Calculation- OT    OT Start Time 0901  -KD      OT Stop Time 0941  -KD      OT Time Calculation (min) 40 min  -KD      Total Timed Code Minutes- OT 40 minute(s)  -KD      OT Received On 03/07/22  -KD              Timed Charges    54862 - OT Therapeutic Exercise Minutes 20  -KD      37598 - OT Self Care/Mgmt Minutes 20  -KD              Total Minutes    Timed Charges Total Minutes 40  -KD       Total Minutes 40  -KD            User Key  (r) = Recorded By, (t) = Taken By, (c) = Cosigned By    Initials Name Provider Type    Sharon Franks COTA Occupational Therapy Assistant              Therapy Charges for Today     Code Description Service Date Service Provider Modifiers Qty    58275542560 HC OT SELF CARE/MGMT/TRAIN EA 15 MIN 3/7/2022 Sharon Lainez COTA GO 1    93097411666 HC OT THER PROC EA 15 MIN 3/7/2022 Sharon Lainez COTA GO 2               JUANA Sanders  3/7/2022

## 2022-03-07 NOTE — PAYOR COMM NOTE
"Stephany Couch RN [facility]  [phone]    Phone  [phone]     Fax  Cont. Stay Review    Jalil Camarillo (51 y.o. Male)             Date of Birth   1970    Social Security Number       Address   [address] [place] [address]    Home Phone   [phone]    MRN   [id number]       Restoration   Yazidi    Marital Status   Single                            Admission Date   2/26/22    Admission Type   Emergency    Admitting Provider   Yogi Keith MD    Attending Provider   Claude Morris MD    Department, Room/Bed   [facility] 3 EAST, 353/1       Discharge Date       Discharge Disposition       Discharge Destination                               Attending Provider: Claude Morris MD    Allergies: No Known Allergies    Isolation: None   Infection: COVID (History) (02/28/22)   Code Status: CPR   Advance Care Planning Activity    Ht: 190.5 cm (75\")   Wt: 155 kg (342 lb)    Admission Cmt: None   Principal Problem: Chronic cellulitis [L03.90]                 Active Insurance as of 2/26/2022     Primary Coverage     Payor Plan Insurance Group Employer/Plan Group    [facility] [id number]     Payor Plan Address Payor Plan Phone Number Payor Plan Fax Number Effective Dates    [address]   1/1/2021 - None Entered    [place] [address]       Subscriber Name Subscriber Birth Date Member ID       JALIL CAMARILLO 1970 [id number]                 Emergency Contacts      (Rel.) Home Phone Work Phone Mobile Phone    Alex Camarillo (Brother) [phone] -- --            Vital Signs (last day)     Date/Time Temp Temp src Pulse Resp BP Patient Position SpO2    03/07/22 0726 97.5 (36.4) Tympanic 66 18 107/53 Sitting 94    03/07/22 0325 97.9 (36.6) Temporal 60 18 148/74 Sitting 97    03/06/22 2314 -- -- 70 -- -- -- --    03/06/22 1947 98.1 (36.7) Temporal 71 18 130/58 Lying 97    03/06/22 " 1621 -- -- 67 -- -- -- --    03/06/22 1600 96.6 (35.9) Oral 74 18 130/84 Sitting 96    03/06/22 1211 96.5 (35.8) Oral 77 18 137/59 Sitting 97    03/06/22 0730 96.8 (36) Oral 82 18 123/62 Sitting 95    03/06/22 0705 -- -- 82 -- -- -- --    03/06/22 0324 97.9 (36.6) Temporal 71 20 130/60 Lying 94    03/06/22 0056 -- -- 75 20 -- -- 95          Oxygen Therapy (last day)     Date/Time SpO2 Device (Oxygen Therapy) Flow (L/min) Oxygen Concentration (%) ETCO2 (mmHg)    03/07/22 0726 94 room air -- -- --    03/07/22 0325 97 room air -- -- --    03/06/22 1947 97 room air -- -- --    03/06/22 1945 -- room air -- -- --    03/06/22 1600 96 room air -- -- --    03/06/22 1211 97 room air -- -- --    03/06/22 0730 95 room air -- -- --    03/06/22 0324 94 room air -- -- --    03/06/22 0056 95 room air -- -- --          Current Facility-Administered Medications   Medication Dose Route Frequency Provider Last Rate Last Admin   • acetaminophen (TYLENOL) tablet 650 mg  650 mg Oral Q6H PRN Lorenzo Sanderson MD       • albuterol (PROVENTIL) nebulizer solution 0.083% 2.5 mg/3mL  2.5 mg Nebulization Q4H PRN Yogi Keith MD   2.5 mg at 03/06/22 0056   • bacitracin ointment 1 application  1 application Topical Q12H Claude Morris MD   1 application at 03/07/22 0831   • Bag Balm ointment ointment 1 application  1 application Topical Daily Claude Morris MD   1 application at 03/07/22 0831   • furosemide (LASIX) tablet 40 mg  40 mg Oral Daily Saturnino Alcaraz DO   40 mg at 03/07/22 0829   • heparin (porcine) 5000 UNIT/ML injection 5,000 Units  5,000 Units Subcutaneous Q12H Claude Morris MD   5,000 Units at 03/07/22 0829   • pantoprazole (PROTONIX) EC tablet 40 mg  40 mg Oral Q AM Claude Morris MD   40 mg at 03/07/22 0531   • potassium chloride (KLOR-CON) packet 20 mEq  20 mEq Oral Daily Saturnino Alcaraz DO   20 mEq at 03/07/22 0829   • predniSONE (DELTASONE) tablet 20 mg  20 mg Oral Daily With Breakfast Saturnino Alcaraz DO    20 mg at 03/07/22 0829   • sodium chloride 0.9 % flush 10 mL  10 mL Intravenous PRN Claude Morris MD       • sodium chloride 0.9 % flush 10 mL  10 mL Intravenous Q12H Claude Morris MD   10 mL at 03/07/22 0830   • sodium chloride 0.9 % flush 10 mL  10 mL Intravenous PRN Claude Morris MD       • traMADol (ULTRAM) tablet 50 mg  50 mg Oral Q6H PRN Lorenzo Sanderson MD   50 mg at 03/07/22 0531        Physician Progress Notes (last 24 hours)      Lorenzo Sanderson MD at 03/07/22 0026     Summary:Overnight cross cover note             Patient complaining of bilateral cellulitis related leg discomfort.  Wrote for as needed Tylenol and tramadol for leg pain discomfort.    Electronically signed by Lorenzo Sanderson MD at 03/07/22 0027     Saturnino Alcaraz DO at 03/06/22 1849              [facility] Medicine Services  INPATIENT PROGRESS NOTE    Length of Stay: 6  Date of Admission: 2/26/2022  Primary Care Physician: Ethan Casey MD    Subjective   Chief Complaint: Bilateral lower extremity redness and edema  HPI: On evaluation the patient is currently sitting up in his chair.  His legs are elevated.  The legs continue to be wrapped.  The patient states that he is not having any fevers or chills.  The pain is essentially under control.  He states that he is concerned about his Lasix.  He apparently had some kind of reaction etiology remains unclear.  Due to the fact that he is concerned about his edema however it is improving we will restart this medication on a trial basis and follow reaction to the treatment.  He denies any chest pain or headache.  No urinary complaints no dysuria hematuria no urinary retention.  He does remain weak and fatigued.  He is having some cough and some shortness of breath on exertion.  On auscultation of the lungs he is noted with end expiratory wheezes bilateral.  Decreased breath sounds are noted at the bases.  He states that nebulizer  treatments are helping.  We will continue current treatments and augment with daily prednisone.    Review of Systems   Constitutional: Positive for fatigue. Negative for appetite change and chills.   HENT: Negative.    Eyes: Negative.    Respiratory: Negative.  Negative for chest tightness and shortness of breath.    Cardiovascular: Positive for leg swelling. Negative for chest pain and palpitations.   Gastrointestinal: Negative.  Negative for abdominal pain, constipation, diarrhea, nausea and vomiting.   Endocrine: Negative.    Genitourinary: Negative.    Musculoskeletal: Positive for myalgias.   Skin: Positive for color change and wound.   Allergic/Immunologic: Negative.    Neurological: Negative.  Negative for dizziness, weakness, light-headedness, numbness and headaches.   Hematological: Negative.    Psychiatric/Behavioral: Negative.         All pertinent negatives and positives are as above. All other systems have been reviewed and are negative unless otherwise stated.     Objective    Temp:  [96.5 °F (35.8 °C)-98.1 °F (36.7 °C)] 96.6 °F (35.9 °C)  Heart Rate:  [67-82] 67  Resp:  [18-20] 18  BP: (123-137)/(58-84) 130/84    Physical Exam  Vitals and nursing note reviewed.   Constitutional:       Appearance: Normal appearance. He is well-developed. He is obese.      Comments: BMI 42.75   HENT:      Head: Normocephalic and atraumatic.      Right Ear: External ear normal.      Left Ear: External ear normal.      Nose: Nose normal.      Mouth/Throat:      Mouth: Mucous membranes are moist.      Pharynx: Oropharynx is clear.   Eyes:      General: No scleral icterus.     Extraocular Movements: Extraocular movements intact.      Conjunctiva/sclera: Conjunctivae normal.      Pupils: Pupils are equal, round, and reactive to light.   Cardiovascular:      Rate and Rhythm: Normal rate and regular rhythm.      Heart sounds: Normal heart sounds. No murmur heard.    No friction rub. No gallop.   Pulmonary:      Effort:  Pulmonary effort is normal.      Breath sounds: Wheezing present.   Chest:      Chest wall: No tenderness.   Abdominal:      General: Bowel sounds are normal. There is no distension.      Palpations: Abdomen is soft.      Tenderness: There is no abdominal tenderness.   Musculoskeletal:      Cervical back: Normal range of motion and neck supple.      Right lower leg: Edema present.      Left lower leg: Edema present.   Skin:     General: Skin is warm and dry.   Neurological:      General: No focal deficit present.      Mental Status: He is alert and oriented to person, place, and time.   Psychiatric:         Mood and Affect: Mood normal.         Behavior: Behavior normal.         Thought Content: Thought content normal.       Results Review:  I have reviewed the labs, radiology results, and diagnostic studies.    Laboratory Data:   Results from last 7 days   Lab Units 03/06/22 0515 03/05/22 0538 03/04/22  0455   SODIUM mmol/L 137 139 136   POTASSIUM mmol/L 4.4 3.8 4.1   CHLORIDE mmol/L 102 102 102   CO2 mmol/L 25.0 27.0 26.0   BUN mg/dL 15 12 10   CREATININE mg/dL 1.11 1.10 0.96   GLUCOSE mg/dL 155* 92 93   CALCIUM mg/dL 9.3 9.0 9.0   ANION GAP mmol/L 10.0 10.0 8.0     Estimated Creatinine Clearance: 127.3 mL/min (by C-G formula based on SCr of 1.11 mg/dL).          Results from last 7 days   Lab Units 03/06/22 0515 03/05/22 0538 03/04/22  0455 03/03/22  0558 03/02/22  0517   WBC 10*3/mm3 4.30 3.62 3.86 3.70 3.81   HEMOGLOBIN g/dL 11.7* 10.7* 11.1* 10.7* 10.3*   HEMATOCRIT % 36.1* 32.9* 34.6* 32.8* 31.8*   PLATELETS 10*3/mm3 232 210 213 190 180           Culture Data:   No results found for: BLOODCX  No results found for: URINECX  No results found for: RESPCX  No results found for: WOUNDCX  No results found for: STOOLCX  No components found for: BODYFLD    Radiology Data:   Imaging Results (Last 24 Hours)     ** No results found for the last 24 hours. **          I have reviewed the patient's current medications.      Assessment/Plan     Active Hospital Problems    Diagnosis    • **Chronic cellulitis    • Cellulitis of right lower extremity    • Chronic venous stasis    • Lymphedema    • Morbid obesity (HCC)        Plan:  1.  Chronic lower extremity cellulitis: Continue IV antibiotics.  Bilateral lower extremity edema improving.  We will continue current antibiotics and monitor.  We will follow daily labs.  2.  Lymphedema with chronic venous stasis: Continue current wound care plan.  Continue with diuresis we will add Lasix to her regimen.  3.  Morbid obesity: BMI 42.75.  4. Shortness of breath: Continue nebulizer treatments as needed.  Will add prednisone 20 mg a day.  Chest x-ray unremarkable.  5.  DVT prophylaxis: Heparin.    The patient was evaluated during the global COVID-19 pandemic, and the diagnosis was suspected/considered upon their initial presentation.  Evaluation, treatment, and testing were consistent with current guidelines for patients who present with complaints or symptoms that may be related to COVID-19.    I confirmed that the patient's Advance Care Plan is present, code status is documented, or surrogate decision maker is listed in the patient's medical record.       Discharge Planning: I expect patient to be discharged to home versus skilled facility in 3-4 days.      This document has been electronically signed by Saturnino Alcaraz DO on March 6, 2022 18:49 CST        Electronically signed by Saturnino Alcaraz DO at 03/06/22 0993       Medical Student Notes (last 24 hours)  Notes from 03/06/22 1011 through 03/07/22 1011   No notes of this type exist for this encounter.         Consult Notes (last 24 hours)  Notes from 03/06/22 1011 through 03/07/22 1011   No notes of this type exist for this encounter.

## 2022-03-07 NOTE — DISCHARGE PLACEMENT REQUEST
"Jalil Camarillo (51 y.o. Male)             Date of Birth   1970    Social Security Number       Address   [address] [place] [id number]    Home Phone   [phone]    MRN   [id number]       Hoahaoism   Sabianist    Marital Status   Single                            Admission Date   2/26/22    Admission Type   Emergency    Admitting Provider   Yogi Keith MD    Attending Provider   Claude Morris MD    Department, Room/Bed   [address], 353/1       Discharge Date       Discharge Disposition       Discharge Destination                               Attending Provider: Claude Morris MD    Allergies: No Known Allergies    Isolation: None   Infection: COVID (History) (02/28/22)   Code Status: CPR   Advance Care Planning Activity    Ht: 190.5 cm (75\")   Wt: 155 kg (342 lb)    Admission Cmt: None   Principal Problem: Chronic cellulitis [L03.90]                 Active Insurance as of 2/26/2022     Primary Coverage     Payor Plan Insurance Group Employer/Plan Group    [facility] BY PENELOPE [facility] BY PENELOPE [id number]     Payor Plan Address Payor Plan Phone Number Payor Plan Fax Number Effective Dates    [address]   1/1/2021 - None Entered    [facility] [id number]       Subscriber Name Subscriber Birth Date Member ID       JALIL CAMARILLO 1970 [id number]                 Emergency Contacts      (Rel.) Home Phone Work Phone Mobile Phone    Alex Camarillo (Brother) [phone] -- --              "

## 2022-03-07 NOTE — PROGRESS NOTES
Patient complaining of bilateral cellulitis related leg discomfort.  Wrote for as needed Tylenol and tramadol for leg pain discomfort.

## 2022-03-07 NOTE — PROGRESS NOTES
[facility] Medicine Services  INPATIENT PROGRESS NOTE    Length of Stay: 6  Date of Admission: 2/26/2022  Primary Care Physician: Ethan Casey MD    Subjective   Chief Complaint: Bilateral lower extremity redness and edema  HPI: On evaluation the patient is currently sitting up in his chair.  His legs are elevated.  The legs continue to be wrapped.  The patient states that he is not having any fevers or chills.  The pain is essentially under control.  He states that he is concerned about his Lasix.  He apparently had some kind of reaction etiology remains unclear.  Due to the fact that he is concerned about his edema however it is improving we will restart this medication on a trial basis and follow reaction to the treatment.  He denies any chest pain or headache.  No urinary complaints no dysuria hematuria no urinary retention.  He does remain weak and fatigued.  He is having some cough and some shortness of breath on exertion.  On auscultation of the lungs he is noted with end expiratory wheezes bilateral.  Decreased breath sounds are noted at the bases.  He states that nebulizer treatments are helping.  We will continue current treatments and augment with daily prednisone.    Review of Systems   Constitutional: Positive for fatigue. Negative for appetite change and chills.   HENT: Negative.    Eyes: Negative.    Respiratory: Negative.  Negative for chest tightness and shortness of breath.    Cardiovascular: Positive for leg swelling. Negative for chest pain and palpitations.   Gastrointestinal: Negative.  Negative for abdominal pain, constipation, diarrhea, nausea and vomiting.   Endocrine: Negative.    Genitourinary: Negative.    Musculoskeletal: Positive for myalgias.   Skin: Positive for color change and wound.   Allergic/Immunologic: Negative.    Neurological: Negative.  Negative for dizziness, weakness, light-headedness, numbness and headaches.    Hematological: Negative.    Psychiatric/Behavioral: Negative.         All pertinent negatives and positives are as above. All other systems have been reviewed and are negative unless otherwise stated.     Objective    Temp:  [96.5 °F (35.8 °C)-98.1 °F (36.7 °C)] 96.6 °F (35.9 °C)  Heart Rate:  [67-82] 67  Resp:  [18-20] 18  BP: (123-137)/(58-84) 130/84    Physical Exam  Vitals and nursing note reviewed.   Constitutional:       Appearance: Normal appearance. He is well-developed. He is obese.      Comments: BMI 42.75   HENT:      Head: Normocephalic and atraumatic.      Right Ear: External ear normal.      Left Ear: External ear normal.      Nose: Nose normal.      Mouth/Throat:      Mouth: Mucous membranes are moist.      Pharynx: Oropharynx is clear.   Eyes:      General: No scleral icterus.     Extraocular Movements: Extraocular movements intact.      Conjunctiva/sclera: Conjunctivae normal.      Pupils: Pupils are equal, round, and reactive to light.   Cardiovascular:      Rate and Rhythm: Normal rate and regular rhythm.      Heart sounds: Normal heart sounds. No murmur heard.    No friction rub. No gallop.   Pulmonary:      Effort: Pulmonary effort is normal.      Breath sounds: Wheezing present.   Chest:      Chest wall: No tenderness.   Abdominal:      General: Bowel sounds are normal. There is no distension.      Palpations: Abdomen is soft.      Tenderness: There is no abdominal tenderness.   Musculoskeletal:      Cervical back: Normal range of motion and neck supple.      Right lower leg: Edema present.      Left lower leg: Edema present.   Skin:     General: Skin is warm and dry.   Neurological:      General: No focal deficit present.      Mental Status: He is alert and oriented to person, place, and time.   Psychiatric:         Mood and Affect: Mood normal.         Behavior: Behavior normal.         Thought Content: Thought content normal.       Results Review:  I have reviewed the labs, radiology  results, and diagnostic studies.    Laboratory Data:   Results from last 7 days   Lab Units 03/06/22 0515 03/05/22 0538 03/04/22  0455   SODIUM mmol/L 137 139 136   POTASSIUM mmol/L 4.4 3.8 4.1   CHLORIDE mmol/L 102 102 102   CO2 mmol/L 25.0 27.0 26.0   BUN mg/dL 15 12 10   CREATININE mg/dL 1.11 1.10 0.96   GLUCOSE mg/dL 155* 92 93   CALCIUM mg/dL 9.3 9.0 9.0   ANION GAP mmol/L 10.0 10.0 8.0     Estimated Creatinine Clearance: 127.3 mL/min (by C-G formula based on SCr of 1.11 mg/dL).          Results from last 7 days   Lab Units 03/06/22 0515 03/05/22 0538 03/04/22  0455 03/03/22  0558 03/02/22  0517   WBC 10*3/mm3 4.30 3.62 3.86 3.70 3.81   HEMOGLOBIN g/dL 11.7* 10.7* 11.1* 10.7* 10.3*   HEMATOCRIT % 36.1* 32.9* 34.6* 32.8* 31.8*   PLATELETS 10*3/mm3 232 210 213 190 180           Culture Data:   No results found for: BLOODCX  No results found for: URINECX  No results found for: RESPCX  No results found for: WOUNDCX  No results found for: STOOLCX  No components found for: BODYFLD    Radiology Data:   Imaging Results (Last 24 Hours)     ** No results found for the last 24 hours. **          I have reviewed the patient's current medications.     Assessment/Plan     Active Hospital Problems    Diagnosis    • **Chronic cellulitis    • Cellulitis of right lower extremity    • Chronic venous stasis    • Lymphedema    • Morbid obesity (HCC)        Plan:  1.  Chronic lower extremity cellulitis: Continue IV antibiotics.  Bilateral lower extremity edema improving.  We will continue current antibiotics and monitor.  We will follow daily labs.  2.  Lymphedema with chronic venous stasis: Continue current wound care plan.  Continue with diuresis we will add Lasix to her regimen.  3.  Morbid obesity: BMI 42.75.  4. Shortness of breath: Continue nebulizer treatments as needed.  Will add prednisone 20 mg a day.  Chest x-ray unremarkable.  5.  DVT prophylaxis: Heparin.    The patient was evaluated during the global COVID-19  pandemic, and the diagnosis was suspected/considered upon their initial presentation.  Evaluation, treatment, and testing were consistent with current guidelines for patients who present with complaints or symptoms that may be related to COVID-19.    I confirmed that the patient's Advance Care Plan is present, code status is documented, or surrogate decision maker is listed in the patient's medical record.       Discharge Planning: I expect patient to be discharged to home versus skilled facility in 3-4 days.      This document has been electronically signed by Saturnino Alcaraz DO on March 6, 2022 18:49 CST

## 2022-03-08 ENCOUNTER — READMISSION MANAGEMENT (OUTPATIENT)
Dept: CALL CENTER | Facility: HOSPITAL | Age: 52
End: 2022-03-08

## 2022-03-08 NOTE — PAYOR COMM NOTE
"Melany Nicholson  [facility]  Case Management Extender  [phone] phone  [phone] fax      Ref# [id number]    Jalil Camarillo (51 y.o. Male)             Date of Birth   1970    Social Security Number       Address   [address] [place] [id number]    Home Phone   [phone]    MRN   [id number]       Islam   Jehovah's witness    Marital Status   Single                            Admission Date   2/26/22    Admission Type   Emergency    Admitting Provider   Yogi Keith MD    Attending Provider       Department, Room/Bed   [facility], 353/1       Discharge Date   3/7/2022    Discharge Disposition   Home-Health Care [facility]    Discharge Destination                               Attending Provider: (none)   Allergies: No Known Allergies    Isolation: None   Infection: COVID (History) (02/28/22)   Code Status: Prior   Advance Care Planning Activity    Ht: 190.5 cm (75\")   Wt: 155 kg (342 lb)    Admission Cmt: None   Principal Problem: Chronic cellulitis [L03.90]                 Active Insurance as of 2/26/2022     Primary Coverage     Payor Plan Insurance Group Employer/Plan Group    [facility] [employer] [facility] BY NEGRETE [id number]     Payor Plan Address Payor Plan Phone Number Payor Plan Fax Number Effective Dates    [address]   1/1/2021 - None Entered    [place] [address]       Subscriber Name Subscriber Birth Date Member ID       JALIL CAMARILLO 1970 [id number]                 Emergency Contacts      (Rel.) Home Phone Work Phone Mobile Phone    Alex Camarillo (Brother) [phone] -- --               Discharge Summary      Saturnino Alcaraz DO at 03/07/22 [facility]              [facility] Medicine Services  DISCHARGE SUMMARY       Date of Admission: 2/26/2022  Date of Discharge:  3/7/2022  Primary Care Physician: Ethan Casey MD    Presenting " Problem/History of Present Illness:  Cellulitis of right lower extremity [L03.115]       Final Discharge Diagnoses:  Active Hospital Problems    Diagnosis    • **Chronic cellulitis    • Cellulitis of right lower extremity    • Chronic venous stasis    • Lymphedema    • Morbid obesity (HCC)        Consults:   Consults     No orders found from 1/28/2022 to 2/27/2022.          Procedures Performed:                 Pertinent Test Results:   Lab Results (most recent)     Procedure Component Value Units Date/Time    Basic Metabolic Panel [[id number]]  (Normal) Collected: 03/07/22 0522    Specimen: Blood Updated: 03/07/22 0656     Glucose 93 mg/dL      BUN 14 mg/dL      Creatinine 0.95 mg/dL      Sodium 140 mmol/L      Potassium 4.0 mmol/L      Chloride 103 mmol/L      CO2 26.0 mmol/L      Calcium 9.1 mg/dL      BUN/Creatinine Ratio 14.7     Anion Gap 11.0 mmol/L      eGFR 97.5 mL/min/1.73      Comment: National Kidney Foundation and American Society of Nephrology (ASN) Task Force recommended calculation based on the Chronic Kidney Disease Epidemiology Collaboration (CKD-EPI) equation refit without adjustment for race.       Narrative:      GFR Normal >60  Chronic Kidney Disease <60  Kidney Failure <15      CBC & Differential [[id number]]  (Abnormal) Collected: 03/07/22 0522    Specimen: Blood Updated: 03/07/22 0637    Narrative:      The following orders were created for panel order CBC & Differential.  Procedure                               Abnormality         Status                     ---------                               -----------         ------                     CBC Auto Differential[[id number]]        Abnormal            Final result                 Please view results for these tests on the individual orders.    CBC Auto Differential [[id number]]  (Abnormal) Collected: 03/07/22 0522    Specimen: Blood Updated: 03/07/22 0637     WBC 4.74 10*3/mm3      RBC 4.15 10*6/mm3      Hemoglobin 11.6 g/dL      Hematocrit  36.2 %      MCV 87.2 fL      MCH 28.0 pg      MCHC 32.0 g/dL      RDW 16.1 %      RDW-SD 50.4 fl      MPV 9.8 fL      Platelets 212 10*3/mm3      Neutrophil % 45.1 %      Lymphocyte % 41.8 %      Monocyte % 8.9 %      Eosinophil % 3.6 %      Basophil % 0.4 %      Immature Grans % 0.2 %      Neutrophils, Absolute 2.14 10*3/mm3      Lymphocytes, Absolute 1.98 10*3/mm3      Monocytes, Absolute 0.42 10*3/mm3      Eosinophils, Absolute 0.17 10*3/mm3      Basophils, Absolute 0.02 10*3/mm3      Immature Grans, Absolute 0.01 10*3/mm3      nRBC 0.0 /100 WBC     Basic Metabolic Panel [[id number]]  (Abnormal) Collected: 03/06/22 0515    Specimen: Blood Updated: 03/06/22 0627     Glucose 155 mg/dL      BUN 15 mg/dL      Creatinine 1.11 mg/dL      Sodium 137 mmol/L      Potassium 4.4 mmol/L      Chloride 102 mmol/L      CO2 25.0 mmol/L      Calcium 9.3 mg/dL      BUN/Creatinine Ratio 13.5     Anion Gap 10.0 mmol/L      eGFR 80.9 mL/min/1.73      Comment: National Kidney Foundation and American Society of Nephrology (ASN) Task Force recommended calculation based on the Chronic Kidney Disease Epidemiology Collaboration (CKD-EPI) equation refit without adjustment for race.       Narrative:      GFR Normal >60  Chronic Kidney Disease <60  Kidney Failure <15      CBC & Differential [[id number]]  (Abnormal) Collected: 03/06/22 0515    Specimen: Blood Updated: 03/06/22 0603    Narrative:      The following orders were created for panel order CBC & Differential.  Procedure                               Abnormality         Status                     ---------                               -----------         ------                     CBC Auto Differential[[id number]]        Abnormal            Final result                 Please view results for these tests on the individual orders.    CBC Auto Differential [[id number]]  (Abnormal) Collected: 03/06/22 0515    Specimen: Blood Updated: 03/06/22 0603     WBC 4.30 10*3/mm3      RBC 4.19  10*6/mm3      Hemoglobin 11.7 g/dL      Hematocrit 36.1 %      MCV 86.2 fL      MCH 27.9 pg      MCHC 32.4 g/dL      RDW 15.7 %      RDW-SD 49.4 fl      MPV 9.9 fL      Platelets 232 10*3/mm3      Neutrophil % 80.9 %      Lymphocyte % 16.5 %      Monocyte % 1.9 %      Eosinophil % 0.0 %      Basophil % 0.2 %      Immature Grans % 0.5 %      Neutrophils, Absolute 3.48 10*3/mm3      Lymphocytes, Absolute 0.71 10*3/mm3      Monocytes, Absolute 0.08 10*3/mm3      Eosinophils, Absolute 0.00 10*3/mm3      Basophils, Absolute 0.01 10*3/mm3      Immature Grans, Absolute 0.02 10*3/mm3      nRBC 0.0 /100 WBC     Blood Culture - Blood, Arm, Left [[id number]]  (Normal) Collected: 02/26/22 1630    Specimen: Blood from Arm, Left Updated: 03/03/22 1645     Blood Culture No growth at 5 days    Blood Culture - Blood, Arm, Right [[id number]]  (Normal) Collected: 02/26/22 1523    Specimen: Blood from Arm, Right Updated: 03/03/22 1530     Blood Culture No growth at 5 days    Vancomycin, Trough [[id number]]  (Abnormal) Collected: 02/28/22 0532    Specimen: Blood Updated: 02/28/22 0614     Vancomycin Trough 21.20 mcg/mL     Vancomycin, Peak [[id number]]  (Abnormal) Collected: 02/27/22 2116    Specimen: Blood Updated: 02/27/22 2221     Vancomycin Peak 47.40 mcg/mL     Extra Tubes [[id number]] Collected: 02/26/22 1641    Specimen: Blood, Venous Line Updated: 02/26/22 2045    Narrative:      The following orders were created for panel order Extra Tubes.  Procedure                               Abnormality         Status                     ---------                               -----------         ------                     Lavender Top[[id number]]                                     Final result               Green Top (Gel)[[id number]]                                  Final result               Sanderson Top[[id number]]                                         Final result               Light Blue Top[[id number]]                                                                 Please view results for these tests on the individual orders.    Gray Top [[id number]] Collected: 02/26/22 1641    Specimen: Blood Updated: 02/26/22 2045     Extra Tube Hold for add-ons.     Comment: Auto resulted.       Lavender Top [[id number]] Collected: 02/26/22 1642    Specimen: Blood Updated: 02/26/22 1745     Extra Tube hold for add-on     Comment: Auto resulted       Green Top (Gel) [[id number]] Collected: 02/26/22 1642    Specimen: Blood Updated: 02/26/22 1745     Extra Tube Hold for add-ons.     Comment: Auto resulted.       Extra Tubes [[id number]] Collected: 02/26/22 1525    Specimen: Blood, Venous Line Updated: 02/26/22 1630    Narrative:      The following orders were created for panel order Extra Tubes.  Procedure                               Abnormality         Status                     ---------                               -----------         ------                     Gold Top - SST[[id number]]                                   Final result               Light Blue Top[[id number]]                                   Final result                 Please view results for these tests on the individual orders.    Gold Top - SST [[id number]] Collected: 02/26/22 1525    Specimen: Blood Updated: 02/26/22 1630     Extra Tube Hold for add-ons.     Comment: Auto resulted.       Light Blue Top [[id number]] Collected: 02/26/22 1525    Specimen: Blood Updated: 02/26/22 1630     Extra Tube hold for add-on     Comment: Auto resulted       COVID-19 and FLU A/B PCR - Swab, Nasopharynx [[id number]]  (Normal) Collected: 02/26/22 1558    Specimen: Swab from Nasopharynx Updated: 02/26/22 1626     COVID19 Not Detected     Influenza A PCR Not Detected     Influenza B PCR Not Detected    Narrative:      Fact sheet for providers: https://www.fda.gov/media/629352/download    Fact sheet for patients: https://www.fda.gov/media/065877/download    Test performed by PCR.    Comprehensive Metabolic Panel  [[id number]]  (Abnormal) Collected: 02/26/22 1523    Specimen: Blood Updated: 02/26/22 1548     Glucose 107 mg/dL      BUN 10 mg/dL      Creatinine 0.82 mg/dL      Sodium 142 mmol/L      Potassium 3.6 mmol/L      Chloride 107 mmol/L      CO2 26.0 mmol/L      Calcium 9.2 mg/dL      Total Protein 6.9 g/dL      Albumin 4.00 g/dL      ALT (SGPT) 33 U/L      AST (SGOT) 24 U/L      Alkaline Phosphatase 90 U/L      Total Bilirubin 0.5 mg/dL      eGFR Non African Amer 99 mL/min/1.73      Globulin 2.9 gm/dL      A/G Ratio 1.4 g/dL      BUN/Creatinine Ratio 12.2     Anion Gap 9.0 mmol/L     Narrative:      GFR Normal >60  Chronic Kidney Disease <60  Kidney Failure <15      CK [[id number]]  (Abnormal) Collected: 02/26/22 1523    Specimen: Blood Updated: 02/26/22 1548     Creatine Kinase 253 U/L     Lactic Acid, Plasma [[id number]]  (Normal) Collected: 02/26/22 1523    Specimen: Blood Updated: 02/26/22 1545     Lactate 1.3 mmol/L         Imaging Results (Most Recent)     Procedure Component Value Units Date/Time    XR Chest 1 View [[id number]] Collected: 03/05/22 1005     Updated: 03/05/22 1219    Narrative:        PORTABLE CHEST    HISTORY: Shortness of air. Cellulitis right lower extremity.    Portable AP upright film of the chest was obtained at 10:08 AM.  COMPARISON: Most recent February 26, 2022    FINDINGS:   EKG leads.  Chronic obstructive pulmonary disease.  Linear atelectasis left lung base.  Minimal cardiomegaly.  The pulmonary vasculature is not increased.  No pleural effusion.  No pneumothorax.  No acute osseous abnormality.  Degenerative changes are present in the thoracic spine.      Impression:      CONCLUSION:  Chronic obstructive pulmonary disease.  Linear atelectasis left lung base.  Minimal cardiomegaly.    [id number]    Electronically signed by:  Arnulfo Watkins MD  3/5/2022 12:17 PM CST  Workstation: [id number]    XR Chest 1 View [[id number]] Collected: 02/26/22 1533     Updated: 02/26/22 1646    Narrative:      EXAM: XR  CHEST 1 VIEW    HISTORY: shortness of breath    COMPARISON: 9/13/2017    TECHNIQUE:   Portable view of the chest.    FINDINGS:   The lungs are well aerated. There is no pleural effusion. The  heart size is within normal limits. The mediastinal contours are  within normal limits. .  No evidence of focal consolidation. Unremarkable bronchovascular  markings. Unremarkable bilateral hemidiaphragms.  Unremarkable visualized osseous structures.      Impression:      1.  There is no acute cardiopulmonary disease.          Electronically signed by:  Chema Lopez MD  2/26/2022 4:42 PM [facility]  Workstation: [id number]          Chief Complaint on Day of Discharge: Cellulitis bilateral lower extremities    Hospital Course:  The patient is a 51 y.o. male who presented to [facility] with complaint of bilateral lower extremity cellulitis.  Patient is admitted to the facility on 2/26/2022.  He is discharged to home in stable condition 3/7/2022.    Final diagnosis  Chronic bilateral lower extremity cellulitis  Lymphedema  Chronic venous stasis  Morbid obesity BMI 42.75    History  The patient is a 51-year-old male who comes to the ER for evaluation after being noted with bilateral lower extremity cellulitis.  The patient was treated outpatient with doxycycline and Levaquin.  The patient has been seeing wound care once a week.  He noticed a red blood been any improvements and was becoming worse over the last couple days.  In the ER blood cultures and wound cultures were obtained.  The patient was on IV vancomycin.  Wound care was consulted.  The patient was continued on multiple antibiotics throughout his stay.  He finally completed a course of Zyvox along with vancomycin and will still improve slowly.  Patient was seen by physical therapy and Occupational Therapy as well during his stay.  He progressed nicely wounds are stable.  Edema is significantly improved.  The patient had an episode where there was a possibility  "that he might have a allergic reaction to Lasix.  Steroids were started Lasix was discontinued.  Patient did improve.  Requested a trial of Lasix once again.  Lasix was restarted without incident.  Wound care saw the patient and recommended wound care as noted in the chart.  On day of discharge the patient is doing much better.  His vital signs are stable he is afebrile.  He will be discharged home on medications as noted above.  His activity is as tolerated.  Diet is regular.  He is instructed to follow-up with his primary care provider within 1 week of discharge.  The patient will be seen by [facility] for wound care dressing.  He will also be seen by PT and OT as outpatient as well.    Labs on day of discharge metabolic profile was completely normal.  White count is 4.74 H&H is 11.6 and 36.2 platelets are 212.  During his stay blood cultures were completed which were negative.    Condition on Discharge:  Stable     Physical Exam on Discharge:  /53 (BP Location: Right arm, Patient Position: Sitting)   Pulse 67   Temp 97.5 °F (36.4 °C) (Tympanic)   Resp 18   Ht 190.5 cm (75\")   Wt (!) 155 kg (342 lb)   SpO2 94%   BMI 42.75 kg/m²   Physical Exam  Vitals and nursing note reviewed.   Constitutional:       Appearance: Normal appearance.   HENT:      Head: Normocephalic and atraumatic.      Right Ear: External ear normal.      Left Ear: External ear normal.      Nose: Nose normal.      Mouth/Throat:      Mouth: Mucous membranes are moist.      Pharynx: Oropharynx is clear.   Eyes:      Extraocular Movements: Extraocular movements intact.      Conjunctiva/sclera: Conjunctivae normal.      Pupils: Pupils are equal, round, and reactive to light.   Cardiovascular:      Rate and Rhythm: Normal rate and regular rhythm.      Pulses: Normal pulses.      Heart sounds: Normal heart sounds.   Pulmonary:      Effort: Pulmonary effort is normal.      Breath sounds: Normal breath sounds.   Abdominal:      General: Bowel " sounds are normal.      Palpations: Abdomen is soft.   Musculoskeletal:      Cervical back: Normal range of motion and neck supple.      Right lower leg: Edema present.      Left lower leg: Edema present.   Skin:     General: Skin is warm and dry.      Findings: Erythema present.   Neurological:      General: No focal deficit present.      Mental Status: He is alert and oriented to person, place, and time.   Psychiatric:         Mood and Affect: Mood normal.         Behavior: Behavior normal.           Discharge Disposition:  Home-Health Care [facility]    Discharge Medications:     Discharge Medications      New Medications      Instructions Start Date   bacitracin 500 UNIT/GM ointment   1 application, Topical, Every 12 Hours Scheduled      Bag Balm ointment ointment   1 application, Topical, Daily   Start Date: March 8, 2022     furosemide 40 MG tablet  Commonly known as: LASIX   40 mg, Oral, Daily   Start Date: March 8, 2022     potassium chloride 20 MEQ packet  Commonly known as: KLOR-CON   20 mEq, Oral, Daily   Start Date: March 8, 2022     predniSONE 20 MG tablet  Commonly known as: DELTASONE   20 mg, Oral, Daily With Breakfast   Start Date: March 8, 2022        Continue These Medications      Instructions Start Date   IBUPROFEN PO   800 mg, Oral, 4 Times Daily PRN      PRILOSEC PO   20 mg, Oral, Daily PRN             Discharge Diet:   Diet Instructions     Diet: Regular      Discharge Diet: Regular    Fluid Restriction per day: 1500 mL Fluid          Activity at Discharge:   Activity Instructions     Activity as Tolerated            Discharge Care Plan/Instructions: Continue home medications as noted above.  Continue to follow-up with primary care provider within 1 week of discharge.  [facility] provide services.    Follow-up Appointments:   No future appointments.    Test Results Pending at Discharge:     The patient has current or prior documentation of LVEF less than 40%, or moderate to severely depressed left  ventricular systolic function. The patient was prescribed or already taking a beta-blocker.      The patient has current or prior documentation of LVEF less than 40%, or moderate to severely depressed left ventricular systolic function. The patent was prescribed or already taking an ACE inhibitor or ARB.     Saturnino Alcaraz DO    Time: Time to complete discharge greater than 30 minutes.    Electronically signed by Saturnino Alcaraz DO at 03/07/22 2018

## 2022-03-08 NOTE — OUTREACH NOTE
Prep Survey    Flowsheet Row Responses   [facility] facility patient discharged from? [facility]   Is LACE score < 7 ? No   Emergency Room discharge w/ pulse ox? No   Eligibility Readm Mgmt   Discharge diagnosis **Chronic cellulitis   Does the patient have one of the following disease processes/diagnoses(primary or secondary)? Other   Does the patient have Home health ordered? Yes   What is the Home health agency?  [facility]   Is there a DME ordered? No   Medication alerts for this patient Lasix    Prep survey completed? Yes          DOROTHY RODRIGUEZ - Registered Nurse

## 2022-03-09 ENCOUNTER — HOME CARE VISIT (OUTPATIENT)
Dept: HOME HEALTH SERVICES | Facility: CLINIC | Age: 52
End: 2022-03-09

## 2022-03-09 PROCEDURE — G0299 HHS/HOSPICE OF RN EA 15 MIN: HCPCS

## 2022-03-09 PROCEDURE — G0151 HHCP-SERV OF PT,EA 15 MIN: HCPCS

## 2022-03-10 VITALS
TEMPERATURE: 97.4 F | HEART RATE: 90 BPM | DIASTOLIC BLOOD PRESSURE: 78 MMHG | RESPIRATION RATE: 22 BRPM | SYSTOLIC BLOOD PRESSURE: 130 MMHG

## 2022-03-11 ENCOUNTER — HOME CARE VISIT (OUTPATIENT)
Dept: HOME HEALTH SERVICES | Facility: CLINIC | Age: 52
End: 2022-03-11

## 2022-03-11 PROCEDURE — G0152 HHCP-SERV OF OT,EA 15 MIN: HCPCS

## 2022-03-11 PROCEDURE — G0299 HHS/HOSPICE OF RN EA 15 MIN: HCPCS

## 2022-03-12 VITALS
RESPIRATION RATE: 22 BRPM | TEMPERATURE: 97 F | HEART RATE: 74 BPM | DIASTOLIC BLOOD PRESSURE: 88 MMHG | SYSTOLIC BLOOD PRESSURE: 130 MMHG

## 2022-03-14 ENCOUNTER — READMISSION MANAGEMENT (OUTPATIENT)
Dept: CALL CENTER | Facility: HOSPITAL | Age: 52
End: 2022-03-14

## 2022-03-14 ENCOUNTER — HOME CARE VISIT (OUTPATIENT)
Dept: HOME HEALTH SERVICES | Facility: CLINIC | Age: 52
End: 2022-03-14

## 2022-03-14 VITALS
DIASTOLIC BLOOD PRESSURE: 72 MMHG | RESPIRATION RATE: 20 BRPM | OXYGEN SATURATION: 94 % | HEART RATE: 78 BPM | SYSTOLIC BLOOD PRESSURE: 136 MMHG

## 2022-03-14 VITALS
TEMPERATURE: 97.5 F | RESPIRATION RATE: 18 BRPM | DIASTOLIC BLOOD PRESSURE: 60 MMHG | OXYGEN SATURATION: 99 % | HEART RATE: 68 BPM | SYSTOLIC BLOOD PRESSURE: 110 MMHG

## 2022-03-14 PROCEDURE — G0299 HHS/HOSPICE OF RN EA 15 MIN: HCPCS

## 2022-03-14 NOTE — HOME HEALTH
The patient is a 51 y.o. male who presented to [facility] with complaint of bilateral lower extremity cellulitis. Patient is admitted to the facility on 2/26/2022. He is discharged to home in stable condition 3/7/2022.   Final diagnosis   Chronic bilateral lower extremity cellulitis   Lymphedema   Chronic venous stasis   Morbid obesity BMI 42.75    ROM WFL    MMT 4/5    Wants to increase strength for ADLS

## 2022-03-14 NOTE — HOME HEALTH
"Subjective/Patient History:  who comes to the ER for evaluation after being noted with bilateral lower extremity cellulitis.  The patient was treated outpatient with doxycycline and Levaquin.  The patient has been seeing wound care once a week.  He noticed a red blood been any improvements and was becoming worse over the last couple days.  In the ER blood cultures and wound cultures were obtained.  The patient was on IV vancomycin.  Wound care was consulted.  The patient was continued on multiple antibiotics throughout his stay.  He finally completed a course of Zyvox along with vancomycin and will still improve slowly. On day of discharge the patient is doing much better.  His vital signs are stable he is afebrile.  He will be discharged home on medications as noted above.  His activity is as tolerated.  Diet is regular.  He is instructed to follow-up with his primary care provider within 1 week of discharge.  The patient will be seen by home health for wound care dressing.  Patient also having PT evalaution for reduced functional mobility. Patient reporting he is fair today. Reports having pain in his lower extremities from ongoing cellulitis and swelling in LEs. Patient reports getting around fair but slow and painful at this time. Jesse has brother who picked up his new prescription medications and who will be taking him to his follow up appointments. Patient reporting wanting to improve his pain level so he cane get around more functionally in and out of his home.     Prior Level of Function: mod i w/ all activities, mod i w/ SPC use    Past Medical History:   Chronic cellulitis      Chronic venous stasis      COPD (chronic obstructive pulmonary disease) (HCC)      GERD (gastroesophageal reflux disease)      Hyperlipidemia      Hypertension      Hypothyroidism      Lymphedema      Morbid obesity (HCC)      Osteoarthritis       Patient Goal: \"walk better, have legs feel better, do things for myself\""

## 2022-03-14 NOTE — OUTREACH NOTE
Medical Week 1 Survey    Flowsheet Row Responses   [facility] patient discharged from? [facility]   Does the patient have one of the following disease processes/diagnoses(primary or secondary)? Other   Week 1 attempt successful? No   Unsuccessful attempts Attempt 1          LON HARRISON - Registered Nurse

## 2022-03-16 ENCOUNTER — HOME CARE VISIT (OUTPATIENT)
Dept: HOME HEALTH SERVICES | Facility: CLINIC | Age: 52
End: 2022-03-16

## 2022-03-16 VITALS
DIASTOLIC BLOOD PRESSURE: 72 MMHG | TEMPERATURE: 97.4 F | RESPIRATION RATE: 22 BRPM | SYSTOLIC BLOOD PRESSURE: 130 MMHG | HEART RATE: 84 BPM

## 2022-03-16 NOTE — CASE COMMUNICATION
Patient missed a OT visit from [facility] on 3/16/22    Reason: no answer to calls/messages, no answer at door    Ralph DE LEON  3/16/22      For your records only.   As per home health protocol, MD must be notified of missed/cancelled visits; therefore the prescribed frequency was not met.

## 2022-03-17 ENCOUNTER — READMISSION MANAGEMENT (OUTPATIENT)
Dept: CALL CENTER | Facility: HOSPITAL | Age: 52
End: 2022-03-17

## 2022-03-17 ENCOUNTER — HOME CARE VISIT (OUTPATIENT)
Dept: HOME HEALTH SERVICES | Facility: CLINIC | Age: 52
End: 2022-03-17

## 2022-03-17 PROCEDURE — G0157 HHC PT ASSISTANT EA 15: HCPCS

## 2022-03-17 NOTE — OUTREACH NOTE
Medical Week 1 Survey    Flowsheet Row Responses   [facility] patient discharged from? [facility]   Does the patient have one of the following disease processes/diagnoses(primary or secondary)? Other   Week 1 attempt successful? No   Unsuccessful attempts Attempt 2  [The bother number was a wrong number]   Discharge diagnosis **Chronic cellulitis          CHAPARRO H - Registered Nurse

## 2022-03-18 ENCOUNTER — HOME CARE VISIT (OUTPATIENT)
Dept: HOME HEALTH SERVICES | Facility: CLINIC | Age: 52
End: 2022-03-18

## 2022-03-18 VITALS
RESPIRATION RATE: 18 BRPM | DIASTOLIC BLOOD PRESSURE: 90 MMHG | SYSTOLIC BLOOD PRESSURE: 144 MMHG | HEART RATE: 81 BPM | TEMPERATURE: 97.2 F | OXYGEN SATURATION: 99 %

## 2022-03-18 VITALS
SYSTOLIC BLOOD PRESSURE: 140 MMHG | HEART RATE: 74 BPM | TEMPERATURE: 98 F | DIASTOLIC BLOOD PRESSURE: 78 MMHG | RESPIRATION RATE: 18 BRPM

## 2022-03-18 PROCEDURE — G0300 HHS/HOSPICE OF LPN EA 15 MIN: HCPCS

## 2022-03-22 ENCOUNTER — HOSPITAL ENCOUNTER (EMERGENCY)
Facility: HOSPITAL | Age: 52
Discharge: HOME | End: 2022-03-23
Attending: EMERGENCY MEDICINE | Admitting: EMERGENCY MEDICINE

## 2022-03-22 ENCOUNTER — HOME CARE VISIT (OUTPATIENT)
Dept: HOME HEALTH SERVICES | Facility: CLINIC | Age: 52
End: 2022-03-22

## 2022-03-22 VITALS
HEART RATE: 75 BPM | DIASTOLIC BLOOD PRESSURE: 90 MMHG | OXYGEN SATURATION: 97 % | SYSTOLIC BLOOD PRESSURE: 134 MMHG | TEMPERATURE: 98.4 F | RESPIRATION RATE: 18 BRPM

## 2022-03-22 DIAGNOSIS — L03.116 CELLULITIS OF LEFT LOWER EXTREMITY: Primary | ICD-10-CM

## 2022-03-22 DIAGNOSIS — Z91.199 NON COMPLIANCE WITH MEDICAL TREATMENT: ICD-10-CM

## 2022-03-22 DIAGNOSIS — I89.0 LYMPHEDEMA: ICD-10-CM

## 2022-03-22 DIAGNOSIS — L03.90 CHRONIC CELLULITIS: ICD-10-CM

## 2022-03-22 PROCEDURE — G0157 HHC PT ASSISTANT EA 15: HCPCS

## 2022-03-22 PROCEDURE — 36415 COLL VENOUS BLD VENIPUNCTURE: CPT

## 2022-03-22 PROCEDURE — 99283 EMERGENCY DEPT VISIT LOW MDM: CPT

## 2022-03-22 PROCEDURE — G0158 HHC OT ASSISTANT EA 15: HCPCS

## 2022-03-23 ENCOUNTER — READMISSION MANAGEMENT (OUTPATIENT)
Dept: CALL CENTER | Facility: HOSPITAL | Age: 52
End: 2022-03-23

## 2022-03-23 ENCOUNTER — HOME CARE VISIT (OUTPATIENT)
Dept: HOME HEALTH SERVICES | Facility: CLINIC | Age: 52
End: 2022-03-23

## 2022-03-23 VITALS
RESPIRATION RATE: 18 BRPM | SYSTOLIC BLOOD PRESSURE: 138 MMHG | HEIGHT: 75 IN | WEIGHT: 315 LBS | HEART RATE: 86 BPM | OXYGEN SATURATION: 99 % | TEMPERATURE: 98.1 F | DIASTOLIC BLOOD PRESSURE: 71 MMHG | BODY MASS INDEX: 39.17 KG/M2

## 2022-03-23 VITALS
DIASTOLIC BLOOD PRESSURE: 90 MMHG | OXYGEN SATURATION: 97 % | RESPIRATION RATE: 18 BRPM | TEMPERATURE: 98.4 F | HEART RATE: 75 BPM | SYSTOLIC BLOOD PRESSURE: 134 MMHG

## 2022-03-23 LAB
ALBUMIN SERPL-MCNC: 4.1 G/DL (ref 3.5–5.2)
ALBUMIN/GLOB SERPL: 1.2 G/DL
ALP SERPL-CCNC: 97 U/L (ref 39–117)
ALT SERPL W P-5'-P-CCNC: 63 U/L (ref 1–41)
ANION GAP SERPL CALCULATED.3IONS-SCNC: 12 MMOL/L (ref 5–15)
AST SERPL-CCNC: 35 U/L (ref 1–40)
BASOPHILS # BLD AUTO: 0.03 10*3/MM3 (ref 0–0.2)
BASOPHILS NFR BLD AUTO: 0.5 % (ref 0–1.5)
BILIRUB SERPL-MCNC: 0.4 MG/DL (ref 0–1.2)
BUN SERPL-MCNC: 14 MG/DL (ref 6–20)
BUN/CREAT SERPL: 13.9 (ref 7–25)
CALCIUM SPEC-SCNC: 9.2 MG/DL (ref 8.6–10.5)
CHLORIDE SERPL-SCNC: 104 MMOL/L (ref 98–107)
CO2 SERPL-SCNC: 24 MMOL/L (ref 22–29)
CREAT SERPL-MCNC: 1.01 MG/DL (ref 0.76–1.27)
D-LACTATE SERPL-SCNC: 1.3 MMOL/L (ref 0.5–2)
DEPRECATED RDW RBC AUTO: 48.9 FL (ref 37–54)
EGFRCR SERPLBLD CKD-EPI 2021: 90.6 ML/MIN/1.73
EOSINOPHIL # BLD AUTO: 0.27 10*3/MM3 (ref 0–0.4)
EOSINOPHIL NFR BLD AUTO: 4.1 % (ref 0.3–6.2)
ERYTHROCYTE [DISTWIDTH] IN BLOOD BY AUTOMATED COUNT: 15.7 % (ref 12.3–15.4)
GLOBULIN UR ELPH-MCNC: 3.4 GM/DL
GLUCOSE SERPL-MCNC: 89 MG/DL (ref 65–99)
HCT VFR BLD AUTO: 40.6 % (ref 37.5–51)
HGB BLD-MCNC: 13.3 G/DL (ref 13–17.7)
IMM GRANULOCYTES # BLD AUTO: 0.02 10*3/MM3 (ref 0–0.05)
IMM GRANULOCYTES NFR BLD AUTO: 0.3 % (ref 0–0.5)
LYMPHOCYTES # BLD AUTO: 1.97 10*3/MM3 (ref 0.7–3.1)
LYMPHOCYTES NFR BLD AUTO: 29.6 % (ref 19.6–45.3)
MCH RBC QN AUTO: 28.1 PG (ref 26.6–33)
MCHC RBC AUTO-ENTMCNC: 32.8 G/DL (ref 31.5–35.7)
MCV RBC AUTO: 85.8 FL (ref 79–97)
MONOCYTES # BLD AUTO: 0.85 10*3/MM3 (ref 0.1–0.9)
MONOCYTES NFR BLD AUTO: 12.8 % (ref 5–12)
NEUTROPHILS NFR BLD AUTO: 3.51 10*3/MM3 (ref 1.7–7)
NEUTROPHILS NFR BLD AUTO: 52.7 % (ref 42.7–76)
NRBC BLD AUTO-RTO: 0 /100 WBC (ref 0–0.2)
NT-PROBNP SERPL-MCNC: <5 PG/ML (ref 0–900)
PLATELET # BLD AUTO: 259 10*3/MM3 (ref 140–450)
PMV BLD AUTO: 10.7 FL (ref 6–12)
POTASSIUM SERPL-SCNC: 3.6 MMOL/L (ref 3.5–5.2)
PROT SERPL-MCNC: 7.5 G/DL (ref 6–8.5)
RBC # BLD AUTO: 4.73 10*6/MM3 (ref 4.14–5.8)
SODIUM SERPL-SCNC: 140 MMOL/L (ref 136–145)
WBC NRBC COR # BLD: 6.65 10*3/MM3 (ref 3.4–10.8)

## 2022-03-23 PROCEDURE — 83605 ASSAY OF LACTIC ACID: CPT | Performed by: EMERGENCY MEDICINE

## 2022-03-23 PROCEDURE — 83880 ASSAY OF NATRIURETIC PEPTIDE: CPT | Performed by: EMERGENCY MEDICINE

## 2022-03-23 PROCEDURE — 80053 COMPREHEN METABOLIC PANEL: CPT | Performed by: EMERGENCY MEDICINE

## 2022-03-23 PROCEDURE — 87040 BLOOD CULTURE FOR BACTERIA: CPT | Performed by: EMERGENCY MEDICINE

## 2022-03-23 PROCEDURE — 85025 COMPLETE CBC W/AUTO DIFF WBC: CPT | Performed by: EMERGENCY MEDICINE

## 2022-03-23 RX ORDER — CLINDAMYCIN HYDROCHLORIDE 150 MG/1
450 CAPSULE ORAL 3 TIMES DAILY
Qty: 63 CAPSULE | Refills: 0 | Status: SHIPPED | OUTPATIENT
Start: 2022-03-23 | End: 2022-03-30

## 2022-03-23 RX ORDER — CLINDAMYCIN HYDROCHLORIDE 150 MG/1
600 CAPSULE ORAL ONCE
Status: COMPLETED | OUTPATIENT
Start: 2022-03-23 | End: 2022-03-23

## 2022-03-23 RX ORDER — SODIUM CHLORIDE 0.9 % (FLUSH) 0.9 %
10 SYRINGE (ML) INJECTION AS NEEDED
Status: DISCONTINUED | OUTPATIENT
Start: 2022-03-23 | End: 2022-03-23 | Stop reason: HOSPADM

## 2022-03-23 RX ORDER — ROSUVASTATIN CALCIUM 20 MG/1
20 TABLET, COATED ORAL DAILY
COMMUNITY

## 2022-03-23 RX ORDER — LOSARTAN POTASSIUM 25 MG/1
25 TABLET ORAL DAILY
COMMUNITY

## 2022-03-23 RX ADMIN — CLINDAMYCIN HYDROCHLORIDE 600 MG: 150 CAPSULE ORAL at 02:22

## 2022-03-23 NOTE — CASE COMMUNICATION
PT WAS REFERRED TO HOME HEALTH FOLLOWING: Subjective/Patient History:  who comes to the ER for evaluation after being noted with bilateral lower extremity cellulitis.  The patient was treated outpatient with doxycycline and Levaquin.  The patient has been seeing wound care once a week.  He noticed a red blood been any improvements and was becoming worse over the last couple days.  In the ER blood cultures and wound cultures were obtaine d.  The patient was on IV vancomycin.  Wound care was consulted.  The patient was continued on multiple antibiotics throughout his stay.  He finally completed a course of Zyvox along with vancomycin and will still improve slowly. On day of discharge the patient is doing much better.  His vital signs are stable he is afebrile.  He will be discharged home on medications as noted above.  His activity is as tolerated.  Diet is regular.  He  is instructed to follow-up with his primary care provider within 1 week of discharge.  The patient will be seen by home health for wound care dressing.  Patient also having PT evalaution for reduced functional mobility. Patient reporting he is fair today. Reports having pain in his lower extremities from ongoing cellulitis and swelling in LEs. Patient reports getting around fair but slow and painful at this time. Jesse has brother who  picked up his new prescription medications and who will be taking him to his follow up appointments. Patient reporting wanting to improve his pain level so he cane get around more functionally in and out of his home.     PRIOR VS CURRENT LEVEL OF FUNCTION:    GAIT: 60'ft with SPC and CGA on eval. 200'ft in/out of home including step without AD and independence on D/C.    T/F: Sit to stands with SBA on eval. Sit to stands with independe nce on eval.     BED MOBILITY: Within defined limits on eval and D/C.    DISCHARGE CONDITION: GOOD    DISCHARGE REASON: GOALS MET    DISCHARGE TO SELF-CARE WITH FAMILY ASSIST AS NEEDED.       NEXT MD VISIT: Unsure

## 2022-03-23 NOTE — HOME HEALTH
Patient states he is doing ok today except this left leg is more swollen. I can do everything I need to do without concern. Been doing my exercises.

## 2022-03-23 NOTE — OUTREACH NOTE
Medical Week 1 Survey    Flowsheet Row Responses   [facility] patient discharged from? [place]   Does the patient have one of the following disease processes/diagnoses(primary or secondary)? Other   Week 1 attempt successful? No   Revoke Phone number issues  [Patient phone # gives recording of call rejected. ]          MARIANNE DIETZ - Registered Nurse

## 2022-03-23 NOTE — ED PROVIDER NOTES
Subjective   51-year-old male morbidly obese with significant lymphedema presents emergency department with concern for leg swelling.  After entering the room patient has significant erythema to his bilateral lower extremities left greater than right.  He reports that this is been ongoing for about a month.  He reports that his daughter tried to help him wrap the leg and he has an Ace bandage loosely wrapped around his left lower ankle.  He has history of COPD, chronic venous stasis, hypertension, hyperlipidemia and lymphedema.  He denies any recent antibiotics.  After further review of the documentation in his chart he actually has had an ongoing issue with recurrent cellulitis of both extremities over the last couple of years with medical noncompliance and history with not following up with home health.  He denies any fevers, chills or systemic symptoms.  He actually had an appointment with home health today but did not complete some of the paperwork and they did not see him.  He has an appointment tomorrow.    Family history, surgical history, social history, current medications and allergies are reviewed with the patient and triage documentation and vitals are reviewed.      History provided by:  Patient and medical records   used: No        Review of Systems   Constitutional: Negative for chills and fever.   HENT: Negative for congestion and sore throat.    Eyes: Negative for photophobia and visual disturbance.   Respiratory: Negative for cough, shortness of breath and wheezing.    Cardiovascular: Positive for leg swelling. Negative for chest pain and palpitations.   Gastrointestinal: Negative for abdominal pain, diarrhea, nausea and vomiting.   Endocrine: Negative for polydipsia, polyphagia and polyuria.   Genitourinary: Negative for dysuria, frequency and urgency.   Musculoskeletal: Positive for myalgias (bilateral lower legs). Negative for arthralgias, back pain and neck pain.   Skin:  Positive for color change, rash and wound.   Allergic/Immunologic: Negative.    Neurological: Negative for weakness, light-headedness and numbness.   Hematological: Negative.    Psychiatric/Behavioral: Negative.        Past Medical History:   Diagnosis Date   • Chronic cellulitis    • Chronic venous stasis    • COPD (chronic obstructive pulmonary disease) (HCC)    • GERD (gastroesophageal reflux disease)    • Hyperlipidemia    • Hypertension    • Hypothyroidism    • Lymphedema    • Morbid obesity (HCC)    • Osteoarthritis        No Known Allergies    Past Surgical History:   Procedure Laterality Date   • HERNIA REPAIR Right        Family History   Adopted: Yes   Problem Relation Age of Onset   • Cancer Mother    • Diabetes Maternal Grandmother        Social History     Socioeconomic History   • Marital status: Single   Tobacco Use   • Smoking status: Former Smoker     Types: Cigarettes   • Smokeless tobacco: Never Used   Substance and Sexual Activity   • Alcohol use: No   • Drug use: No   • Sexual activity: Not Currently           Objective   Physical Exam  Vitals and nursing note reviewed.   Constitutional:       General: He is not in acute distress.     Appearance: Normal appearance. He is morbidly obese. He is not ill-appearing, toxic-appearing or diaphoretic.      Comments: Disheveled, malodorous, Ace bandages, and slippers on the affected extremities covered in dog hair   HENT:      Head: Normocephalic.   Eyes:      Conjunctiva/sclera: Conjunctivae normal.      Pupils: Pupils are equal, round, and reactive to light.   Cardiovascular:      Rate and Rhythm: Normal rate and regular rhythm.      Heart sounds: No murmur heard.  Pulmonary:      Effort: Pulmonary effort is normal. No respiratory distress.      Breath sounds: Normal breath sounds. No wheezing.   Abdominal:      General: Bowel sounds are normal.      Palpations: Abdomen is soft.   Musculoskeletal:         General: Swelling and tenderness present.       Cervical back: Normal range of motion and neck supple.      Right lower leg: Tenderness present. No bony tenderness. 3+ Edema present.      Left lower leg: Tenderness present. No bony tenderness. 4+ Edema present.        Legs:       Comments: L>R swelling and erythema, no weeping   Skin:     General: Skin is warm and dry.      Capillary Refill: Capillary refill takes less than 2 seconds.      Findings: Erythema present.   Neurological:      General: No focal deficit present.      Mental Status: He is alert and oriented to person, place, and time.   Psychiatric:         Mood and Affect: Mood normal.         Behavior: Behavior normal.         Procedures  none         ED Course      Labs Reviewed   COMPREHENSIVE METABOLIC PANEL - Abnormal; Notable for the following components:       Result Value    ALT (SGPT) 63 (*)     All other components within normal limits    Narrative:     GFR Normal >60  Chronic Kidney Disease <60  Kidney Failure <15     CBC WITH AUTO DIFFERENTIAL - Abnormal; Notable for the following components:    RDW 15.7 (*)     Monocyte % 12.8 (*)     All other components within normal limits   LACTIC ACID, PLASMA - Normal   BNP (IN-HOUSE) - Normal    Narrative:     Among patients with dyspnea, NT-proBNP is highly sensitive for the detection of acute congestive heart failure. In addition NT-proBNP of <300 pg/ml effectively rules out acute congestive heart failure with 99% negative predictive value.    Results may be falsely decreased if patient taking Biotin.     BLOOD CULTURE   BLOOD CULTURE   CBC AND DIFFERENTIAL    Narrative:     The following orders were created for panel order CBC & Differential.  Procedure                               Abnormality         Status                     ---------                               -----------         ------                     CBC Auto Differential[[id number]]        Abnormal            Final result                 Please view results for these tests on the  individual orders.     XR Chest 1 View    Result Date: 3/5/2022  Narrative: PORTABLE CHEST HISTORY: Shortness of air. Cellulitis right lower extremity. Portable AP upright film of the chest was obtained at 10:08 AM. COMPARISON: Most recent February 26, 2022 FINDINGS: EKG leads. Chronic obstructive pulmonary disease. Linear atelectasis left lung base. Minimal cardiomegaly. The pulmonary vasculature is not increased. No pleural effusion. No pneumothorax. No acute osseous abnormality. Degenerative changes are present in the thoracic spine.     Impression: CONCLUSION: Chronic obstructive pulmonary disease. Linear atelectasis left lung base. Minimal cardiomegaly. [id number] Electronically signed by:  Arnulfo Watkins MD  3/5/2022 12:17 PM CST Workstation: [id number]    XR Chest 1 View    Result Date: 2/26/2022  Narrative: EXAM: XR CHEST 1 VIEW HISTORY: shortness of breath COMPARISON: 9/13/2017 TECHNIQUE: Portable view of the chest. FINDINGS: The lungs are well aerated. There is no pleural effusion. The heart size is within normal limits. The mediastinal contours are within normal limits. . No evidence of focal consolidation. Unremarkable bronchovascular markings. Unremarkable bilateral hemidiaphragms. Unremarkable visualized osseous structures.     Impression: 1.  There is no acute cardiopulmonary disease. Electronically signed by:  Chema Lopez MD  2/26/2022 4:42 PM CST Workstation: [id number]          MDM  Number of Diagnoses or Management Options     Amount and/or Complexity of Data Reviewed  Clinical lab tests: reviewed    Patient Progress  Patient progress: stable    White blood cell count is not elevated at 6.7.  Lactic acid also not elevated at 1.3.  No signs of systemic infection.  Blood cultures are obtained.  BNP is nondetectable.  Electrolytes unremarkable and vital signs remained stable throughout stay with oxygen saturation greater than 97% on room air.  Is not tachycardic nor tachypneic.  He has not been on any recent  antibiotics and cellulitis seems to be localized to the lower extremities and is an acute on chronic presentation.  Started on clindamycin.  Advised on weekly follow-up with home health as this is the only way to manage his recurrent issues.  Patient knowledges understanding and is agreeable to discharge and outpatient management plan.    Final diagnoses:   Chronic cellulitis   Lymphedema   Non compliance with medical treatment   Cellulitis of left lower extremity         ED Disposition  ED Disposition     ED Disposition   Discharge    Condition   Stable    Comment   --             Ethan Casey MD  [address]  [place] [address] [phone]          Home Health Appointment  Today for wound care and wrapping of legs             Medication List      New Prescriptions    clindamycin 150 MG capsule  Commonly known as: CLEOCIN  Take 3 capsules by mouth 3 (Three) Times a Day for 7 days.           Where to Get Your Medications      These medications were sent to [id number] - [place], [place] - [address] [place] [address] AT [facility] [phone]  - [phone] [address] [address]    Phone: [phone]   · clindamycin 150 MG capsule          Ji Keith,   03/23/22 0335

## 2022-03-28 LAB
BACTERIA SPEC AEROBE CULT: NORMAL
BACTERIA SPEC AEROBE CULT: NORMAL

## 2022-04-20 ENCOUNTER — HOSPITAL ENCOUNTER (OUTPATIENT)
Facility: HOSPITAL | Age: 52
Setting detail: OBSERVATION
Discharge: NURSING, REHAB OR LONG-TERM CARE | End: 2022-04-22
Attending: STUDENT IN AN ORGANIZED HEALTH CARE EDUCATION/TRAINING PROGRAM | Admitting: INTERNAL MEDICINE

## 2022-04-20 ENCOUNTER — APPOINTMENT (OUTPATIENT)
Dept: ULTRASOUND IMAGING | Facility: HOSPITAL | Age: 52
End: 2022-04-20

## 2022-04-20 DIAGNOSIS — Z74.09 IMPAIRED MOBILITY AND ADLS: ICD-10-CM

## 2022-04-20 DIAGNOSIS — Z78.9 IMPAIRED MOBILITY AND ADLS: ICD-10-CM

## 2022-04-20 DIAGNOSIS — L03.90 CELLULITIS, UNSPECIFIED CELLULITIS SITE: Primary | ICD-10-CM

## 2022-04-20 DIAGNOSIS — Z74.09 IMPAIRED FUNCTIONAL MOBILITY, BALANCE, GAIT, AND ENDURANCE: ICD-10-CM

## 2022-04-20 LAB
ANION GAP SERPL CALCULATED.3IONS-SCNC: 10 MMOL/L (ref 5–15)
BASOPHILS # BLD AUTO: 0.04 10*3/MM3 (ref 0–0.2)
BASOPHILS NFR BLD AUTO: 0.7 % (ref 0–1.5)
BUN SERPL-MCNC: 13 MG/DL (ref 6–20)
BUN/CREAT SERPL: 14.1 (ref 7–25)
CALCIUM SPEC-SCNC: 8.7 MG/DL (ref 8.6–10.5)
CHLORIDE SERPL-SCNC: 108 MMOL/L (ref 98–107)
CO2 SERPL-SCNC: 22 MMOL/L (ref 22–29)
CREAT SERPL-MCNC: 0.92 MG/DL (ref 0.76–1.27)
CRP SERPL-MCNC: 0.91 MG/DL (ref 0–0.5)
D-LACTATE SERPL-SCNC: 1.2 MMOL/L (ref 0.5–2)
DEPRECATED RDW RBC AUTO: 46.3 FL (ref 37–54)
EGFRCR SERPLBLD CKD-EPI 2021: 101.3 ML/MIN/1.73
EOSINOPHIL # BLD AUTO: 0.33 10*3/MM3 (ref 0–0.4)
EOSINOPHIL NFR BLD AUTO: 5.7 % (ref 0.3–6.2)
ERYTHROCYTE [DISTWIDTH] IN BLOOD BY AUTOMATED COUNT: 15.2 % (ref 12.3–15.4)
ERYTHROCYTE [SEDIMENTATION RATE] IN BLOOD: 24 MM/HR (ref 0–15)
FLUAV RNA RESP QL NAA+PROBE: NOT DETECTED
FLUBV RNA RESP QL NAA+PROBE: NOT DETECTED
GLUCOSE SERPL-MCNC: 124 MG/DL (ref 65–99)
HCT VFR BLD AUTO: 38.3 % (ref 37.5–51)
HGB BLD-MCNC: 12.5 G/DL (ref 13–17.7)
HOLD SPECIMEN: NORMAL
HOLD SPECIMEN: NORMAL
IMM GRANULOCYTES # BLD AUTO: 0.03 10*3/MM3 (ref 0–0.05)
IMM GRANULOCYTES NFR BLD AUTO: 0.5 % (ref 0–0.5)
LYMPHOCYTES # BLD AUTO: 1.75 10*3/MM3 (ref 0.7–3.1)
LYMPHOCYTES NFR BLD AUTO: 30.1 % (ref 19.6–45.3)
MCH RBC QN AUTO: 27.4 PG (ref 26.6–33)
MCHC RBC AUTO-ENTMCNC: 32.6 G/DL (ref 31.5–35.7)
MCV RBC AUTO: 83.8 FL (ref 79–97)
MONOCYTES # BLD AUTO: 0.55 10*3/MM3 (ref 0.1–0.9)
MONOCYTES NFR BLD AUTO: 9.5 % (ref 5–12)
NEUTROPHILS NFR BLD AUTO: 3.12 10*3/MM3 (ref 1.7–7)
NEUTROPHILS NFR BLD AUTO: 53.5 % (ref 42.7–76)
NRBC BLD AUTO-RTO: 0 /100 WBC (ref 0–0.2)
NT-PROBNP SERPL-MCNC: 15.5 PG/ML (ref 0–900)
PLATELET # BLD AUTO: 218 10*3/MM3 (ref 140–450)
PMV BLD AUTO: 10.2 FL (ref 6–12)
POTASSIUM SERPL-SCNC: 3.9 MMOL/L (ref 3.5–5.2)
RBC # BLD AUTO: 4.57 10*6/MM3 (ref 4.14–5.8)
SARS-COV-2 RNA RESP QL NAA+PROBE: NOT DETECTED
SODIUM SERPL-SCNC: 140 MMOL/L (ref 136–145)
TROPONIN T SERPL-MCNC: <0.01 NG/ML (ref 0–0.03)
WBC NRBC COR # BLD: 5.82 10*3/MM3 (ref 3.4–10.8)
WHOLE BLOOD HOLD SPECIMEN: NORMAL
WHOLE BLOOD HOLD SPECIMEN: NORMAL

## 2022-04-20 PROCEDURE — 25010000002 VANCOMYCIN 10 G RECONSTITUTED SOLUTION: Performed by: STUDENT IN AN ORGANIZED HEALTH CARE EDUCATION/TRAINING PROGRAM

## 2022-04-20 PROCEDURE — 99284 EMERGENCY DEPT VISIT MOD MDM: CPT

## 2022-04-20 PROCEDURE — 96366 THER/PROPH/DIAG IV INF ADDON: CPT

## 2022-04-20 PROCEDURE — G0378 HOSPITAL OBSERVATION PER HR: HCPCS

## 2022-04-20 PROCEDURE — 87641 MR-STAPH DNA AMP PROBE: CPT | Performed by: INTERNAL MEDICINE

## 2022-04-20 PROCEDURE — 85651 RBC SED RATE NONAUTOMATED: CPT | Performed by: INTERNAL MEDICINE

## 2022-04-20 PROCEDURE — 96372 THER/PROPH/DIAG INJ SC/IM: CPT

## 2022-04-20 PROCEDURE — 83605 ASSAY OF LACTIC ACID: CPT | Performed by: STUDENT IN AN ORGANIZED HEALTH CARE EDUCATION/TRAINING PROGRAM

## 2022-04-20 PROCEDURE — C9803 HOPD COVID-19 SPEC COLLECT: HCPCS

## 2022-04-20 PROCEDURE — 87636 SARSCOV2 & INF A&B AMP PRB: CPT | Performed by: STUDENT IN AN ORGANIZED HEALTH CARE EDUCATION/TRAINING PROGRAM

## 2022-04-20 PROCEDURE — 36415 COLL VENOUS BLD VENIPUNCTURE: CPT

## 2022-04-20 PROCEDURE — 84484 ASSAY OF TROPONIN QUANT: CPT | Performed by: STUDENT IN AN ORGANIZED HEALTH CARE EDUCATION/TRAINING PROGRAM

## 2022-04-20 PROCEDURE — 86140 C-REACTIVE PROTEIN: CPT | Performed by: INTERNAL MEDICINE

## 2022-04-20 PROCEDURE — 25010000002 ENOXAPARIN PER 10 MG: Performed by: INTERNAL MEDICINE

## 2022-04-20 PROCEDURE — 87040 BLOOD CULTURE FOR BACTERIA: CPT | Performed by: STUDENT IN AN ORGANIZED HEALTH CARE EDUCATION/TRAINING PROGRAM

## 2022-04-20 PROCEDURE — 80048 BASIC METABOLIC PNL TOTAL CA: CPT | Performed by: STUDENT IN AN ORGANIZED HEALTH CARE EDUCATION/TRAINING PROGRAM

## 2022-04-20 PROCEDURE — 83880 ASSAY OF NATRIURETIC PEPTIDE: CPT | Performed by: STUDENT IN AN ORGANIZED HEALTH CARE EDUCATION/TRAINING PROGRAM

## 2022-04-20 PROCEDURE — 96365 THER/PROPH/DIAG IV INF INIT: CPT

## 2022-04-20 PROCEDURE — 93970 EXTREMITY STUDY: CPT

## 2022-04-20 PROCEDURE — 85025 COMPLETE CBC W/AUTO DIFF WBC: CPT | Performed by: STUDENT IN AN ORGANIZED HEALTH CARE EDUCATION/TRAINING PROGRAM

## 2022-04-20 RX ORDER — SODIUM CHLORIDE 0.9 % (FLUSH) 0.9 %
10 SYRINGE (ML) INJECTION EVERY 12 HOURS SCHEDULED
Status: DISCONTINUED | OUTPATIENT
Start: 2022-04-20 | End: 2022-04-22 | Stop reason: HOSPADM

## 2022-04-20 RX ORDER — SODIUM CHLORIDE 0.9 % (FLUSH) 0.9 %
10 SYRINGE (ML) INJECTION AS NEEDED
Status: DISCONTINUED | OUTPATIENT
Start: 2022-04-20 | End: 2022-04-22 | Stop reason: HOSPADM

## 2022-04-20 RX ORDER — FUROSEMIDE 40 MG/1
40 TABLET ORAL DAILY
Status: DISCONTINUED | OUTPATIENT
Start: 2022-04-21 | End: 2022-04-22 | Stop reason: HOSPADM

## 2022-04-20 RX ORDER — PANTOPRAZOLE SODIUM 40 MG/1
40 TABLET, DELAYED RELEASE ORAL
Status: DISCONTINUED | OUTPATIENT
Start: 2022-04-21 | End: 2022-04-22 | Stop reason: HOSPADM

## 2022-04-20 RX ADMIN — Medication 10 ML: at 23:17

## 2022-04-20 RX ADMIN — ENOXAPARIN SODIUM 40 MG: 40 INJECTION SUBCUTANEOUS at 23:27

## 2022-04-20 RX ADMIN — VANCOMYCIN HYDROCHLORIDE 2500 MG: 10 INJECTION, POWDER, LYOPHILIZED, FOR SOLUTION INTRAVENOUS at 23:17

## 2022-04-21 LAB
ALBUMIN SERPL-MCNC: 3.5 G/DL (ref 3.5–5.2)
ALBUMIN/GLOB SERPL: 1.2 G/DL
ALP SERPL-CCNC: 83 U/L (ref 39–117)
ALT SERPL W P-5'-P-CCNC: 35 U/L (ref 1–41)
ANION GAP SERPL CALCULATED.3IONS-SCNC: 7 MMOL/L (ref 5–15)
AST SERPL-CCNC: 18 U/L (ref 1–40)
BILIRUB SERPL-MCNC: 0.3 MG/DL (ref 0–1.2)
BUN SERPL-MCNC: 13 MG/DL (ref 6–20)
BUN/CREAT SERPL: 14.4 (ref 7–25)
CALCIUM SPEC-SCNC: 8.8 MG/DL (ref 8.6–10.5)
CHLORIDE SERPL-SCNC: 107 MMOL/L (ref 98–107)
CO2 SERPL-SCNC: 24 MMOL/L (ref 22–29)
CREAT SERPL-MCNC: 0.9 MG/DL (ref 0.76–1.27)
CRP SERPL-MCNC: 0.99 MG/DL (ref 0–0.5)
D-LACTATE SERPL-SCNC: 1.2 MMOL/L (ref 0.5–2)
DEPRECATED RDW RBC AUTO: 47.4 FL (ref 37–54)
EGFRCR SERPLBLD CKD-EPI 2021: 104 ML/MIN/1.73
ERYTHROCYTE [DISTWIDTH] IN BLOOD BY AUTOMATED COUNT: 15.3 % (ref 12.3–15.4)
GLOBULIN UR ELPH-MCNC: 2.9 GM/DL
GLUCOSE BLDC GLUCOMTR-MCNC: 101 MG/DL (ref 70–130)
GLUCOSE BLDC GLUCOMTR-MCNC: 170 MG/DL (ref 70–130)
GLUCOSE BLDC GLUCOMTR-MCNC: 83 MG/DL (ref 70–130)
GLUCOSE SERPL-MCNC: 116 MG/DL (ref 65–99)
HBA1C MFR BLD: 5.9 % (ref 4.8–5.6)
HCT VFR BLD AUTO: 37.1 % (ref 37.5–51)
HGB BLD-MCNC: 12.1 G/DL (ref 13–17.7)
MAGNESIUM SERPL-MCNC: 1.9 MG/DL (ref 1.6–2.6)
MCH RBC QN AUTO: 27.8 PG (ref 26.6–33)
MCHC RBC AUTO-ENTMCNC: 32.6 G/DL (ref 31.5–35.7)
MCV RBC AUTO: 85.1 FL (ref 79–97)
MRSA DNA SPEC QL NAA+PROBE: NEGATIVE
PLATELET # BLD AUTO: 202 10*3/MM3 (ref 140–450)
PMV BLD AUTO: 10.2 FL (ref 6–12)
POTASSIUM SERPL-SCNC: 3.8 MMOL/L (ref 3.5–5.2)
PROCALCITONIN SERPL-MCNC: 0.07 NG/ML (ref 0–0.25)
PROT SERPL-MCNC: 6.4 G/DL (ref 6–8.5)
RBC # BLD AUTO: 4.36 10*6/MM3 (ref 4.14–5.8)
SODIUM SERPL-SCNC: 138 MMOL/L (ref 136–145)
T4 FREE SERPL-MCNC: 0.97 NG/DL (ref 0.93–1.7)
TSH SERPL DL<=0.05 MIU/L-ACNC: 3.15 UIU/ML (ref 0.27–4.2)
WBC NRBC COR # BLD: 6.44 10*3/MM3 (ref 3.4–10.8)

## 2022-04-21 PROCEDURE — 25010000002 ENOXAPARIN PER 10 MG: Performed by: INTERNAL MEDICINE

## 2022-04-21 PROCEDURE — 63710000001 DIPHENHYDRAMINE PER 50 MG: Performed by: NURSE PRACTITIONER

## 2022-04-21 PROCEDURE — 96366 THER/PROPH/DIAG IV INF ADDON: CPT

## 2022-04-21 PROCEDURE — 85027 COMPLETE CBC AUTOMATED: CPT | Performed by: INTERNAL MEDICINE

## 2022-04-21 PROCEDURE — 84145 PROCALCITONIN (PCT): CPT | Performed by: INTERNAL MEDICINE

## 2022-04-21 PROCEDURE — 83036 HEMOGLOBIN GLYCOSYLATED A1C: CPT | Performed by: INTERNAL MEDICINE

## 2022-04-21 PROCEDURE — 84443 ASSAY THYROID STIM HORMONE: CPT | Performed by: INTERNAL MEDICINE

## 2022-04-21 PROCEDURE — 86140 C-REACTIVE PROTEIN: CPT | Performed by: NURSE PRACTITIONER

## 2022-04-21 PROCEDURE — 96372 THER/PROPH/DIAG INJ SC/IM: CPT

## 2022-04-21 PROCEDURE — 96367 TX/PROPH/DG ADDL SEQ IV INF: CPT

## 2022-04-21 PROCEDURE — 80053 COMPREHEN METABOLIC PANEL: CPT | Performed by: INTERNAL MEDICINE

## 2022-04-21 PROCEDURE — 82962 GLUCOSE BLOOD TEST: CPT

## 2022-04-21 PROCEDURE — G0378 HOSPITAL OBSERVATION PER HR: HCPCS

## 2022-04-21 PROCEDURE — 84439 ASSAY OF FREE THYROXINE: CPT | Performed by: INTERNAL MEDICINE

## 2022-04-21 PROCEDURE — 25010000002 CEFEPIME PER 500 MG: Performed by: INTERNAL MEDICINE

## 2022-04-21 PROCEDURE — 83735 ASSAY OF MAGNESIUM: CPT | Performed by: INTERNAL MEDICINE

## 2022-04-21 PROCEDURE — 83605 ASSAY OF LACTIC ACID: CPT | Performed by: INTERNAL MEDICINE

## 2022-04-21 PROCEDURE — 25010000002 VANCOMYCIN 10 G RECONSTITUTED SOLUTION: Performed by: INTERNAL MEDICINE

## 2022-04-21 RX ORDER — LOSARTAN POTASSIUM 25 MG/1
25 TABLET ORAL DAILY
Status: DISCONTINUED | OUTPATIENT
Start: 2022-04-21 | End: 2022-04-22 | Stop reason: HOSPADM

## 2022-04-21 RX ORDER — NICOTINE POLACRILEX 4 MG
15 LOZENGE BUCCAL
Status: DISCONTINUED | OUTPATIENT
Start: 2022-04-21 | End: 2022-04-22 | Stop reason: HOSPADM

## 2022-04-21 RX ORDER — ROSUVASTATIN CALCIUM 20 MG/1
20 TABLET, COATED ORAL DAILY
Status: DISCONTINUED | OUTPATIENT
Start: 2022-04-21 | End: 2022-04-22 | Stop reason: HOSPADM

## 2022-04-21 RX ORDER — DIPHENHYDRAMINE HCL 25 MG
25 CAPSULE ORAL EVERY 6 HOURS PRN
Status: DISCONTINUED | OUTPATIENT
Start: 2022-04-21 | End: 2022-04-22 | Stop reason: HOSPADM

## 2022-04-21 RX ORDER — DEXTROSE MONOHYDRATE 25 G/50ML
25 INJECTION, SOLUTION INTRAVENOUS
Status: DISCONTINUED | OUTPATIENT
Start: 2022-04-21 | End: 2022-04-22 | Stop reason: HOSPADM

## 2022-04-21 RX ADMIN — ENOXAPARIN SODIUM 40 MG: 40 INJECTION SUBCUTANEOUS at 23:18

## 2022-04-21 RX ADMIN — LOSARTAN POTASSIUM 25 MG: 25 TABLET, FILM COATED ORAL at 15:45

## 2022-04-21 RX ADMIN — CEFEPIME HYDROCHLORIDE 2 G: 2 INJECTION, POWDER, FOR SOLUTION INTRAVENOUS at 08:42

## 2022-04-21 RX ADMIN — CEFEPIME HYDROCHLORIDE 2 G: 2 INJECTION, POWDER, FOR SOLUTION INTRAVENOUS at 01:42

## 2022-04-21 RX ADMIN — VANCOMYCIN HYDROCHLORIDE 2000 MG: 10 INJECTION, POWDER, LYOPHILIZED, FOR SOLUTION INTRAVENOUS at 11:01

## 2022-04-21 RX ADMIN — FUROSEMIDE 40 MG: 40 TABLET ORAL at 08:41

## 2022-04-21 RX ADMIN — Medication 10 ML: at 08:44

## 2022-04-21 RX ADMIN — PANTOPRAZOLE SODIUM 40 MG: 40 TABLET, DELAYED RELEASE ORAL at 08:41

## 2022-04-21 RX ADMIN — ROSUVASTATIN CALCIUM 20 MG: 20 TABLET, FILM COATED ORAL at 15:45

## 2022-04-21 RX ADMIN — Medication 10 ML: at 08:42

## 2022-04-21 RX ADMIN — Medication 1 APPLICATION: at 15:46

## 2022-04-21 RX ADMIN — DIPHENHYDRAMINE HYDROCHLORIDE 25 MG: 25 CAPSULE ORAL at 15:56

## 2022-04-21 RX ADMIN — VANCOMYCIN HYDROCHLORIDE 2000 MG: 10 INJECTION, POWDER, LYOPHILIZED, FOR SOLUTION INTRAVENOUS at 23:18

## 2022-04-21 RX ADMIN — CEFEPIME HYDROCHLORIDE 2 G: 2 INJECTION, POWDER, FOR SOLUTION INTRAVENOUS at 17:22

## 2022-04-21 NOTE — PROGRESS NOTES
[facility] Medicine Services  INPATIENT PROGRESS NOTE    Length of Stay: 0  Date of Admission: 4/20/2022  Primary Care Physician: Ethan Casey MD    Subjective   Chief Complaint: No complaints    HPI: This is a 51-year-old male with past medical history of bilateral lower extremity lymphedema, chronic cellulitis, oral stasis, COPD, GERD, HTN, HL and hypothyroidism that presented to [facility] on 4/20/2022 with complaints of worsening redness of his bilateral lower extremities.     The patient has had multiple admissions between [facility] and [facility] since the beginning of the year.    Review of Systems   Constitutional: Negative for activity change and fatigue.   HENT: Negative for ear pain and sore throat.    Eyes: Negative for pain and discharge.   Respiratory: Negative for cough and shortness of breath.    Cardiovascular: Negative for chest pain and palpitations.   Gastrointestinal: Negative for abdominal pain and nausea.   Endocrine: Negative for cold intolerance and heat intolerance.   Genitourinary: Negative for difficulty urinating and dysuria.   Musculoskeletal: Negative for arthralgias and gait problem.   Skin: Positive for color change and wound. Negative for rash.   Neurological: Negative for dizziness and weakness.   Psychiatric/Behavioral: Negative for agitation and confusion.        Objective    Temp:  [97.3 °F (36.3 °C)-98.8 °F (37.1 °C)] 97.3 °F (36.3 °C)  Heart Rate:  [82-95] 85  Resp:  [15-20] 18  BP: (110-157)/(54-74) 138/61    Physical Exam  Constitutional:       Appearance: He is well-developed.   HENT:      Head: Normocephalic and atraumatic.   Eyes:      Pupils: Pupils are equal, round, and reactive to light.   Cardiovascular:      Rate and Rhythm: Normal rate and regular rhythm.   Pulmonary:      Effort: Pulmonary effort is normal.      Breath sounds: Normal breath sounds.   Abdominal:      General: Bowel sounds are normal.       Palpations: Abdomen is soft.   Musculoskeletal:         General: Normal range of motion.      Cervical back: Normal range of motion and neck supple.   Skin:     General: Skin is warm and dry.   Neurological:      Mental Status: He is alert and oriented to person, place, and time.   Psychiatric:         Behavior: Behavior normal.       Results Review:  I have reviewed the labs, radiology results, and diagnostic studies.    Laboratory Data:   Results from last 7 days   Lab Units 04/21/22 0458 04/20/22 2029   SODIUM mmol/L 138 140   POTASSIUM mmol/L 3.8 3.9   CHLORIDE mmol/L 107 108*   CO2 mmol/L 24.0 22.0   BUN mg/dL 13 13   CREATININE mg/dL 0.90 0.92   GLUCOSE mg/dL 116* 124*   CALCIUM mg/dL 8.8 8.7   BILIRUBIN mg/dL 0.3  --    ALK PHOS U/L 83  --    ALT (SGPT) U/L 35  --    AST (SGOT) U/L 18  --    ANION GAP mmol/L 7.0 10.0     Estimated Creatinine Clearance: 156.9 mL/min (by C-G formula based on SCr of 0.9 mg/dL).  Results from last 7 days   Lab Units 04/21/22 0458   MAGNESIUM mg/dL 1.9         Results from last 7 days   Lab Units 04/21/22 0458 04/20/22 2029   WBC 10*3/mm3 6.44 5.82   HEMOGLOBIN g/dL 12.1* 12.5*   HEMATOCRIT % 37.1* 38.3   PLATELETS 10*3/mm3 202 218           Culture Data:   No results found for: BLOODCX  No results found for: URINECX  No results found for: RESPCX  No results found for: WOUNDCX  No results found for: STOOLCX  No components found for: BODYFLD    Radiology Data:   Imaging Results (Last 24 Hours)     Procedure Component Value Units Date/Time    US Venous Doppler Lower Extremity Bilateral (duplex) [[id number]] Collected: 04/20/22 2231     Updated: 04/20/22 9232    Narrative:      EXAM:    US Duplex Bilateral Lower Extremities Veins    CLINICAL HISTORY:    The patient is 51 years old and is Male; Rule out DVT, L03.90  Cellulitis, unspecified    TECHNIQUE:    Real-time duplex ultrasound scan of the bilateral lower  extremity veins integrating B-mode two-dimensional  vascular  structure, Doppler spectral analysis, color flow Doppler imaging  and compression.    COMPARISON:    No relevant prior studies available.    FINDINGS:    RIGHT DEEP VEINS:  Unremarkable.  No DVT in the right common  femoral, femoral, proximal deep femoral or popliteal veins.  The  veins demonstrate normal color flow, are normally compressible,  with normal phasic flow and/or augmentation response.    RIGHT SUPERFICIAL VEINS:  Unremarkable.  No thrombus in the  visualized right great saphenous vein.      LEFT DEEP VEINS:  Unremarkable.  No DVT in the left common  femoral, femoral, proximal deep femoral or popliteal veins.  The  veins demonstrate normal color flow, are normally compressible,  with normal phasic flow and/or augmentation response.    LEFT SUPERFICIAL VEINS:  Unremarkable.  No thrombus in the  visualized left great saphenous vein.      SOFT TISSUES: Diffuse soft tissue swelling of the lower leg is  noted.  No popliteal cyst.      Impression:        Normal bilateral lower extremity duplex venous ultrasound.    Electronically signed by:  Sirisha He MD  4/20/2022 11:54 PM  CDT Workstation: [id number]          I have reviewed the patient's current medications.     Assessment/Plan     Active Hospital Problems    Diagnosis    • Cellulitis        Plan:    1.  Chronic lower extremity cellulitis: Continue IV antibiotics.    2.  Lymphedema with chronic venous stasis: Wound care following.  Continue home Lasix.  3.  Morbid obesity: BMI 43.12.  Nutrition consult appreciated.  4.  Hypertension: Continue home losartan.  5.  Hyperlipidemia: Continue statin.  6.  GERD: Continue Protonix.  7.  Diabetes mellitis, type II:  Hemoglobin A1C 5.90.  Start SSI.    DVT prophylaxis: Lovenox        Discharge Planning: I expect patient to be discharged to home in 2-3 days.    I confirmed that the patient's Advance Care Plan is present, code status is documented, or surrogate decision maker is listed in the patient's  medical record.      I have utilized all available immediate resources to obtain, update, or review the patient's current medications.         This document has been electronically signed by HALINA Vazquez on April 21, 2022 15:23 CDT

## 2022-04-21 NOTE — ED NOTES
Patient is at an increased risk for falls. Fall light activated, yellow falls bracelet and yellow non-skid socks placed on patient. Call light within reach and patient instructed to call for assistance. Side rails up x2, bed alarm activated, and gait belt readily accessible.

## 2022-04-21 NOTE — DISCHARGE PLACEMENT REQUEST
"Jalil Camarillo (51 y.o. Male)             Date of Birth   1970    Social Security Number       Address   [address] [place] [address]    Home Phone   [phone]    MRN   [id number]       Confucianism   Congregational    Marital Status   Single                            Admission Date   4/20/22    Admission Type   Emergency    Admitting Provider   Behroozi, Saeid, MD    Attending Provider   Behroozi, Saeid, MD    Department, Room/Bed   [address], 423/1       Discharge Date       Discharge Disposition       Discharge Destination                               Attending Provider: Behroozi, Saeid, MD    Allergies: No Known Allergies    Isolation: None   Infection: None   Code Status: CPR   Advance Care Planning Activity    Ht: 190.5 cm (75\")   Wt: 156 kg (345 lb)    Admission Cmt: None   Principal Problem: None                Active Insurance as of 4/20/2022     Primary Coverage     Payor Plan Insurance Group Employer/Plan Group    [employer] BY [employer] [id number]     Payor Plan Address Payor Plan Phone Number Payor Plan Fax Number Effective Dates    [address]   1/1/2021 - None Entered    [facility] [address]       Subscriber Name Subscriber Birth Date Member ID       JALIL CAMARILLO 1970 [id number]                 Emergency Contacts      (Rel.) Home Phone Work Phone Mobile Phone    Alex Camarillo (Brother) [phone] -- [phone]            Insurance Information                [employer] BY [employer]OPE Phone: --    Subscriber: Marquise Jalil Ralph Subscriber#: [id number]    Group#: [id number] Precert#: --          "

## 2022-04-21 NOTE — PROGRESS NOTES
"  Pharmacokinetics by Pharmacy - Vancomycin    Jalil Camarillo is a 51 y.o. male   [Ht: 190.5 cm (75\"); Wt: (!) 156 kg (345 lb)] was started on Vancomycin for SSTI and Cefepime 2 gm IV every 8 hours.     Estimated Creatinine Clearance: 156.9 mL/min (by C-G formula based on SCr of 0.9 mg/dL).   Creatinine   Date Value Ref Range Status   04/21/2022 0.90 0.76 - 1.27 mg/dL Final   04/20/2022 0.92 0.76 - 1.27 mg/dL Final   04/07/2022 0.8 0.7 - 1.3 mg/dL Final   04/06/2022 0.8 0.7 - 1.3 mg/dL Final   04/05/2022 0.8 0.7 - 1.3 mg/dL Final      Lab Results   Component Value Date    WBC 6.44 04/21/2022    WBC 5.82 04/20/2022    WBC 6.65 03/23/2022      Temp Readings from Last 1 Encounters:   04/21/22 98.2 °F (36.8 °C) (Oral)      C-Reactive Protein   Date Value Ref Range Status   04/20/2022 0.91 (H) 0.00 - 0.50 mg/dL Final   04/06/2022 1.6 (H) 0.0 - 0.5 mg/dL Final   04/03/2022 1.5 (H) 0.0 - 0.5 mg/dL Final   01/06/2022 3.7 (H) 0.0 - 0.5 mg/dL Final     Lactate   Date Value Ref Range Status   04/21/2022 1.2 0.5 - 2.0 mmol/L Final   04/20/2022 1.2 0.5 - 2.0 mmol/L Final   03/23/2022 1.3 0.5 - 2.0 mmol/L Final       Indication for use: skin and soft tissue infection     Baseline culture results:  Microbiology Results (last 10 days)       Procedure Component Value - Date/Time    MRSA Screen, PCR (Inpatient) - Swab, Nares [[id number]]  (Normal) Collected: 04/20/22 0665    Lab Status: Final result Specimen: Swab from Nares Updated: 04/21/22 0115     MRSA, PCR Negative    Narrative:      Performed by real-time polymerase chain reaction (qPCR).    COVID-19 and FLU A/B PCR - Swab, Nasopharynx [[id number]]  (Normal) Collected: 04/20/22 2158    Lab Status: Final result Specimen: Swab from Nasopharynx Updated: 04/20/22 2256     COVID19 Not Detected     Influenza A PCR Not Detected     Influenza B PCR Not Detected    Narrative:      Fact sheet for providers: https://www.fda.gov/media/229929/download    Fact sheet for patients: " https://www.fda.gov/media/112609/download    Test performed by PCR.          Blood cultures are pending  BMI 43.12    Assessment/Plan  Initiated Vancomycin 2500 mg x1 dose last night at 2317 then dosed at 2000 mg IVPB Q12H to start at 1100.  Will order  trough level 4/22 at 1030.   Pt has been on Vancomycin previously and adjusted dose based on last doses. Cr is 0.9, increased from last time receiving Vancomycin.  Pharmacy will monitor renal function and adjust dose accordingly.    Mary Kendall, PharmD  04/21/22 09:21 CDT

## 2022-04-21 NOTE — PLAN OF CARE
Goal Outcome Evaluation:  Plan of Care Reviewed With: patient           Outcome Evaluation: vss, no distress, pt on antibiotics. up in chair.

## 2022-04-21 NOTE — H&P
[facility] Medicine Admission      Date of Admission: 4/20/2022      Primary Care Physician: Ethan Casey MD      Chief Complaint: Left leg swelling redness    HPI:  Patient is a morbidly obese 51-year-old male with known history of lymphedema to both lower extremities, chronic cellulitis not further specified, venous stasis COPD gastroesophageal reflux disease hypertension hyperlipidemia hypothyroidism, history of hospitalization in March 2022 to hospital for cellulitis of right lower extremity, apparently has been outpatient on antibiotic for the last 1 and half months per his report resented to the ER with complaint of worsening redness of his lower legs.  He was given vancomycin in ED.  Hospitalist service was called for admission of the patient concerning bilateral lower extremity cellulitis.  Patient was seen and examined in ED room 17.  Patient reports over the last 2 days he noticed worsening redness and swelling to his right left leg. he has chronic pain to his legs.  His both lower extremities are dressed in an old dirty looking dressing.  He denies any fall injury or trauma fever chills headache sore throat chest pain shortness of breath palpitation abdominal pain diarrhea constipation bleeding in particular.  He lives at home with family.    Concurrent Medical History:  has a past medical history of Chronic cellulitis, Chronic venous stasis, COPD (chronic obstructive pulmonary disease) (HCC), GERD (gastroesophageal reflux disease), Hyperlipidemia, Hypertension, Hypothyroidism, Lymphedema, Morbid obesity (HCC), and Osteoarthritis.    Past Surgical History:  has a past surgical history that includes Hernia repair (Right).    Family History: family history includes Cancer in his mother; Diabetes in his maternal grandmother. He was adopted.    Social History:  reports that he has quit smoking. His smoking use included cigarettes. He has never used smokeless  tobacco. He reports that he does not drink alcohol and does not use drugs.    Allergies: No Known Allergies    Medications:   Prior to Admission medications    Medication Sig Start Date End Date Taking? Authorizing Provider   IBUPROFEN PO Take 800 mg by mouth 4 (Four) Times a Day As Needed. 1/1/21  Yes Hermann Kowalski MD   losartan (COZAAR) 25 MG tablet Take 25 mg by mouth Daily.   Yes Hermann Kowalski MD   rosuvastatin (CRESTOR) 20 MG tablet Take 20 mg by mouth Daily.   Yes Hermann Kowalski MD   furosemide (LASIX) 40 MG tablet Take 1 tablet by mouth Daily for 30 days. 3/8/22 4/7/22  Saturnino Alcaraz,    Omeprazole (PRILOSEC PO) Take 20 mg by mouth Daily As Needed. 1/1/21   Hermann Kowalski MD       Review of Systems:  Review of Systems   Constitutional: Negative for chills, diaphoresis, fatigue and fever.   HENT: Negative for congestion, dental problem, ear pain, facial swelling, rhinorrhea and sinus pressure.    Eyes: Negative for photophobia, discharge, redness, itching and visual disturbance.   Respiratory: Negative for apnea, cough, choking, chest tightness, shortness of breath, wheezing and stridor.    Cardiovascular: Positive for leg swelling. Negative for chest pain and palpitations.   Gastrointestinal: Negative for abdominal distention, abdominal pain, anal bleeding, blood in stool, diarrhea, nausea, rectal pain and vomiting.   Endocrine: Negative for cold intolerance, heat intolerance, polydipsia, polyphagia and polyuria.   Genitourinary: Negative for difficulty urinating, flank pain, frequency, hematuria and urgency.   Musculoskeletal: Negative for arthralgias, back pain, joint swelling and myalgias.   Skin: Positive for color change. Negative for pallor, rash and wound.   Allergic/Immunologic: Negative for environmental allergies and immunocompromised state.   Neurological: Negative for dizziness, tremors, seizures, facial asymmetry, speech difficulty, weakness,  light-headedness, numbness and headaches.   Hematological: Negative for adenopathy. Does not bruise/bleed easily.   Psychiatric/Behavioral: Negative for agitation, behavioral problems and hallucinations. The patient is not nervous/anxious.       Otherwise complete ROS is negative except as mentioned above.    Physical Exam:   Temp:  [98.8 °F (37.1 °C)] 98.8 °F (37.1 °C)  Heart Rate:  [82-94] 86  Resp:  [20] 20  BP: (117-157)/(54-62) 118/58  Physical Exam  Constitutional:       General: He is not in acute distress.     Appearance: He is obese. He is not ill-appearing, toxic-appearing or diaphoretic.   HENT:      Head: Normocephalic and atraumatic.      Right Ear: External ear normal.      Left Ear: External ear normal.      Nose: Nose normal.      Mouth/Throat:      Mouth: Mucous membranes are moist.      Pharynx: Oropharynx is clear.   Eyes:      Extraocular Movements: Extraocular movements intact.      Conjunctiva/sclera: Conjunctivae normal.      Pupils: Pupils are equal, round, and reactive to light.   Cardiovascular:      Rate and Rhythm: Normal rate and regular rhythm.      Heart sounds: No murmur heard.    No friction rub. No gallop.   Pulmonary:      Effort: No respiratory distress.      Breath sounds: No stridor. No wheezing or rales.   Chest:      Chest wall: No tenderness.   Abdominal:      General: Abdomen is flat. There is no distension.      Palpations: Abdomen is soft.      Tenderness: There is no abdominal tenderness. There is no guarding or rebound.   Musculoskeletal:         General: Swelling (left leg) and tenderness (left leg) present.      Cervical back: No rigidity or tenderness.      Right lower leg: Edema present.      Left lower leg: Edema present.   Lymphadenopathy:      Cervical: No cervical adenopathy.   Skin:     General: Skin is warm and dry.      Coloration: Skin is not jaundiced.      Findings: No erythema.      Comments: Chronic ischemic changes to both lower extremities    Neurological:      Mental Status: He is alert and oriented to person, place, and time. Mental status is at baseline.      Sensory: No sensory deficit.      Motor: No weakness.      Coordination: Coordination normal.   Psychiatric:         Mood and Affect: Mood normal.         Behavior: Behavior normal.         Judgment: Judgment normal.           Results Reviewed:  I have personally reviewed current lab, radiology, and data and agree with results.  Lab Results (last 24 hours)     Procedure Component Value Units Date/Time    COVID-19 and FLU A/B PCR - Swab, Nasopharynx [[id number]] Collected: 04/20/22 2158    Specimen: Swab from Nasopharynx Updated: 04/20/22 2215    [place] Draw [[id number]] Collected: 04/20/22 2029    Specimen: Blood Updated: 04/20/22 2133    Narrative:      The following orders were created for panel order [place] Draw.  Procedure                               Abnormality         Status                     ---------                               -----------         ------                     Green Top (Gel)[[id number]]                                  Final result               Lavender Top[[id number]]                                     Final result               Gold Top - SST[[id number]]                                   Final result               Light Blue Top[[id number]]                                   Final result                 Please view results for these tests on the individual orders.    Green Top (Gel) [[id number]] Collected: 04/20/22 2029    Specimen: Blood Updated: 04/20/22 2133     Extra Tube Hold for add-ons.     Comment: Auto resulted.       Lavender Top [[id number]] Collected: 04/20/22 2029    Specimen: Blood Updated: 04/20/22 2133     Extra Tube hold for add-on     Comment: Auto resulted       Gold Top - SST [[id number]] Collected: 04/20/22 2029    Specimen: Blood Updated: 04/20/22 2133     Extra Tube Hold for add-ons.     Comment: Auto resulted.       Light Blue Top [[id number]]  Collected: 04/20/22 2029    Specimen: Blood Updated: 04/20/22 2133     Extra Tube hold for add-on     Comment: Auto resulted       BNP [[id number]]  (Normal) Collected: 04/20/22 2029    Specimen: Blood Updated: 04/20/22 2132     proBNP 15.5 pg/mL     Narrative:      Among patients with dyspnea, NT-proBNP is highly sensitive for the detection of acute congestive heart failure. In addition NT-proBNP of <300 pg/ml effectively rules out acute congestive heart failure with 99% negative predictive value.    Results may be falsely decreased if patient taking Biotin.      Troponin [[id number]]  (Normal) Collected: 04/20/22 2029    Specimen: Blood Updated: 04/20/22 2132     Troponin T <0.010 ng/mL     Narrative:      Troponin T Reference Range:  <= 0.03 ng/mL-   Negative for AMI  >0.03 ng/mL-     Abnormal for myocardial necrosis.  Clinicians would have to utilize clinical acumen, EKG, Troponin and serial changes to determine if it is an Acute Myocardial Infarction or myocardial injury due to an underlying chronic condition.       Results may be falsely decreased if patient taking Biotin.      Basic Metabolic Panel [[id number]]  (Abnormal) Collected: 04/20/22 2029    Specimen: Blood Updated: 04/20/22 2056     Glucose 124 mg/dL      BUN 13 mg/dL      Creatinine 0.92 mg/dL      Sodium 140 mmol/L      Potassium 3.9 mmol/L      Chloride 108 mmol/L      CO2 22.0 mmol/L      Calcium 8.7 mg/dL      BUN/Creatinine Ratio 14.1     Anion Gap 10.0 mmol/L      eGFR 101.3 mL/min/1.73      Comment: National Kidney Foundation and American Society of Nephrology (ASN) Task Force recommended calculation based on the Chronic Kidney Disease Epidemiology Collaboration (CKD-EPI) equation refit without adjustment for race.       Narrative:      GFR Normal >60  Chronic Kidney Disease <60  Kidney Failure <15      Lactic Acid, Plasma [[id number]]  (Normal) Collected: 04/20/22 2029    Specimen: Blood Updated: 04/20/22 2051     Lactate 1.2 mmol/L     CBC &  Differential [[id number]]  (Abnormal) Collected: 04/20/22 2029    Specimen: Blood Updated: 04/20/22 2040    Narrative:      The following orders were created for panel order CBC & Differential.  Procedure                               Abnormality         Status                     ---------                               -----------         ------                     CBC Auto Differential[[id number]]        Abnormal            Final result                 Please view results for these tests on the individual orders.    CBC Auto Differential [[id number]]  (Abnormal) Collected: 04/20/22 2029    Specimen: Blood Updated: 04/20/22 2040     WBC 5.82 10*3/mm3      RBC 4.57 10*6/mm3      Hemoglobin 12.5 g/dL      Hematocrit 38.3 %      MCV 83.8 fL      MCH 27.4 pg      MCHC 32.6 g/dL      RDW 15.2 %      RDW-SD 46.3 fl      MPV 10.2 fL      Platelets 218 10*3/mm3      Neutrophil % 53.5 %      Lymphocyte % 30.1 %      Monocyte % 9.5 %      Eosinophil % 5.7 %      Basophil % 0.7 %      Immature Grans % 0.5 %      Neutrophils, Absolute 3.12 10*3/mm3      Lymphocytes, Absolute 1.75 10*3/mm3      Monocytes, Absolute 0.55 10*3/mm3      Eosinophils, Absolute 0.33 10*3/mm3      Basophils, Absolute 0.04 10*3/mm3      Immature Grans, Absolute 0.03 10*3/mm3      nRBC 0.0 /100 WBC     Blood Culture - Blood, Arm, Left [[id number]] Collected: 04/20/22 2029    Specimen: Blood from Arm, Left Updated: 04/20/22 2038        Imaging Results (Last 24 Hours)     ** No results found for the last 24 hours. **            Assessment:    Active Hospital Problems    Diagnosis    • Cellulitis      # Cellulitis of left lower extremity with failure of outpatient therapy  # Bilateral lower extremity lymphedema   # Morbid obesity  # History of hypertension  # History of hyperlipidemia  # Gastroesophageal reflux disease        Plan:  Obtain further laboratory work-up and laboratory work-up.  Obtain CRP and ESR, procalcitonin level, TSH free T4 level hemoglobin  A1c level  Placed on broad-spectrum IV antibiotic vancomycin and cefepime concerning left lower extremity cellulitis.  Findings to right lower extremity is most probably sequela of chronic venous stasis, lymphedema and not suggestive of new acute  infection.  Obtain wound care service consultation  Continue home medication Lasix 40 mg daily to minimize edema to lower extremities.  Follow BMP considering use of combination of Lasix and vancomycin  Comorbidities will be treated appropriately  DVT and GI prophylaxis will be initiated  Reconcile home medication and continue with essential home medications  Medically supervised weight reduction recommended  Please see orders for comprehensive plan.    I confirmed that the patient's Advance Care Plan is present, code status is documented, or surrogate decision maker is listed in the patient's medical record.   Patient decided for full code.  Patient decided that his daughter Beverley Camarillo be his healthcare proxy.    I have utilized all available immediate resources to obtain, update, or review the patient's current medications.     I discussed the patient's findings and my recommendations with: Patient and he agreed with above plan of care.      Saeid Behroozi, MD   04/20/22   22:17 CDT

## 2022-04-21 NOTE — NURSING NOTE
Patient has BLE edema, with redness, dry areas and intact blisters.  Patient has history of following with wound care center but was healed and discharged.  Patient has diagnosis of lymphedema and states he wears his specialized lymphedema pumps daily.  Encouraged patient to follow up with wound care upon discharge from the hospital of any breakdown occurs.  POA.

## 2022-04-21 NOTE — ED PROVIDER NOTES
Subjective   51-year-old male comes to the ER chief complaint of a month and a half of worsening redness of his lower legs.  Patient has been treated with antibiotics as an outpatient x2.  Redness continues to worsen.  Also complains of pain.  Swelling is there, but he has chronic swelling.  No fevers or chills.  Patient reports it is painful to walk.      History provided by:  Patient   used: No        Review of Systems   Constitutional: Negative for chills and fever.   HENT: Negative for drooling.    Eyes: Negative for redness.   Respiratory: Negative for shortness of breath.    Cardiovascular: Positive for leg swelling. Negative for chest pain.   Gastrointestinal: Negative for abdominal pain, nausea and vomiting.   Genitourinary: Negative for flank pain.   Musculoskeletal:        Leg pain   Skin: Positive for rash. Negative for color change.   Neurological: Negative for seizures.   Psychiatric/Behavioral: Negative for confusion.       Past Medical History:   Diagnosis Date   • Chronic cellulitis    • Chronic venous stasis    • COPD (chronic obstructive pulmonary disease) (HCC)    • GERD (gastroesophageal reflux disease)    • Hyperlipidemia    • Hypertension    • Hypothyroidism    • Lymphedema    • Morbid obesity (HCC)    • Osteoarthritis        No Known Allergies    Past Surgical History:   Procedure Laterality Date   • HERNIA REPAIR Right        Family History   Adopted: Yes   Problem Relation Age of Onset   • Cancer Mother    • Diabetes Maternal Grandmother        Social History     Socioeconomic History   • Marital status: Single   Tobacco Use   • Smoking status: Former Smoker     Types: Cigarettes   • Smokeless tobacco: Never Used   Substance and Sexual Activity   • Alcohol use: No   • Drug use: No   • Sexual activity: Not Currently           Objective    Vitals:    04/20/22 2131 04/20/22 2146 04/20/22 2154 04/20/22 2200   BP: 119/59 118/58     BP Location:       Patient Position:        Pulse: 90 82 86    Resp:    20   Temp:       TempSrc:       SpO2: 100% 97% 100%    Weight:       Height:           Physical Exam  Vitals and nursing note reviewed.   Constitutional:       General: He is not in acute distress.     Appearance: He is well-developed. He is obese. He is ill-appearing. He is not toxic-appearing or diaphoretic.   HENT:      Head: Normocephalic.      Right Ear: External ear normal.      Left Ear: External ear normal.   Pulmonary:      Effort: Pulmonary effort is normal. No accessory muscle usage or respiratory distress.      Breath sounds: No wheezing.   Chest:      Chest wall: No tenderness.   Abdominal:      General: Bowel sounds are normal.      Palpations: Abdomen is soft.      Tenderness: There is no abdominal tenderness (deep palpation).   Musculoskeletal:      Right lower leg: Edema present.      Left lower leg: Edema present.   Skin:     General: Skin is warm and dry.      Capillary Refill: Capillary refill takes less than 2 seconds.      Findings: Erythema present.          Neurological:      Mental Status: He is alert and oriented to person, place, and time.   Psychiatric:         Behavior: Behavior normal.         Procedures           ED Course      Results for orders placed or performed during the hospital encounter of 04/20/22   Basic Metabolic Panel    Specimen: Blood   Result Value Ref Range    Glucose 124 (H) 65 - 99 mg/dL    BUN 13 6 - 20 mg/dL    Creatinine 0.92 0.76 - 1.27 mg/dL    Sodium 140 136 - 145 mmol/L    Potassium 3.9 3.5 - 5.2 mmol/L    Chloride 108 (H) 98 - 107 mmol/L    CO2 22.0 22.0 - 29.0 mmol/L    Calcium 8.7 8.6 - 10.5 mg/dL    BUN/Creatinine Ratio 14.1 7.0 - 25.0    Anion Gap 10.0 5.0 - 15.0 mmol/L    eGFR 101.3 >60.0 mL/min/1.73   Lactic Acid, Plasma    Specimen: Blood   Result Value Ref Range    Lactate 1.2 0.5 - 2.0 mmol/L   CBC Auto Differential    Specimen: Blood   Result Value Ref Range    WBC 5.82 3.40 - 10.80 10*3/mm3    RBC 4.57 4.14 - 5.80  10*6/mm3    Hemoglobin 12.5 (L) 13.0 - 17.7 g/dL    Hematocrit 38.3 37.5 - 51.0 %    MCV 83.8 79.0 - 97.0 fL    MCH 27.4 26.6 - 33.0 pg    MCHC 32.6 31.5 - 35.7 g/dL    RDW 15.2 12.3 - 15.4 %    RDW-SD 46.3 37.0 - 54.0 fl    MPV 10.2 6.0 - 12.0 fL    Platelets 218 140 - 450 10*3/mm3    Neutrophil % 53.5 42.7 - 76.0 %    Lymphocyte % 30.1 19.6 - 45.3 %    Monocyte % 9.5 5.0 - 12.0 %    Eosinophil % 5.7 0.3 - 6.2 %    Basophil % 0.7 0.0 - 1.5 %    Immature Grans % 0.5 0.0 - 0.5 %    Neutrophils, Absolute 3.12 1.70 - 7.00 10*3/mm3    Lymphocytes, Absolute 1.75 0.70 - 3.10 10*3/mm3    Monocytes, Absolute 0.55 0.10 - 0.90 10*3/mm3    Eosinophils, Absolute 0.33 0.00 - 0.40 10*3/mm3    Basophils, Absolute 0.04 0.00 - 0.20 10*3/mm3    Immature Grans, Absolute 0.03 0.00 - 0.05 10*3/mm3    nRBC 0.0 0.0 - 0.2 /100 WBC   Troponin    Specimen: Blood   Result Value Ref Range    Troponin T <0.010 0.000 - 0.030 ng/mL   BNP    Specimen: Blood   Result Value Ref Range    proBNP 15.5 0.0 - 900.0 pg/mL   Green Top (Gel)   Result Value Ref Range    Extra Tube Hold for add-ons.    Lavender Top   Result Value Ref Range    Extra Tube hold for add-on    Gold Top - SST   Result Value Ref Range    Extra Tube Hold for add-ons.    Light Blue Top   Result Value Ref Range    Extra Tube hold for add-on                                                 MDM  Number of Diagnoses or Management Options  Cellulitis, unspecified cellulitis site: new and requires workup  Diagnosis management comments: Vital signs are stable, afebrile.  Physical exam concerning for cellulitis of bilateral lower extremities.  Labs are unremarkable with normal WBC and lactic acid.  Ordered IV vancomycin for the patient.  Spoke with the on-call hospitalist agrees to admit for cellulitis with failed outpatient treatment.      Final diagnoses:   Cellulitis, unspecified cellulitis site       ED Disposition  ED Disposition     ED Disposition   Decision to Admit    Condition   --     Comment   Level of Care: Telemetry [5]   Diagnosis: Cellulitis, unspecified cellulitis site [[id number]]   Admitting Physician: BEHROOZI, SAEID [[id number]]   Attending Physician: BEHROOZI, SAEID [[id number]]               No follow-up provider specified.       Medication List      No changes were made to your prescriptions during this visit.          Tc Bonilla MD  04/20/22 3722

## 2022-04-22 ENCOUNTER — HOSPITAL ENCOUNTER (OUTPATIENT)
Facility: HOSPITAL | Age: 52
End: 2022-04-22
Attending: INTERNAL MEDICINE | Admitting: INTERNAL MEDICINE

## 2022-04-22 ENCOUNTER — HOSPITAL ENCOUNTER (OUTPATIENT)
Facility: HOSPITAL | Age: 52
Discharge: HOME | End: 2022-05-03
Attending: INTERNAL MEDICINE | Admitting: INTERNAL MEDICINE

## 2022-04-22 VITALS
BODY MASS INDEX: 39.17 KG/M2 | OXYGEN SATURATION: 98 % | HEART RATE: 77 BPM | WEIGHT: 315 LBS | DIASTOLIC BLOOD PRESSURE: 61 MMHG | TEMPERATURE: 96.7 F | HEIGHT: 75 IN | SYSTOLIC BLOOD PRESSURE: 126 MMHG | RESPIRATION RATE: 20 BRPM

## 2022-04-22 DIAGNOSIS — Z74.09 IMPAIRED MOBILITY AND ADLS: ICD-10-CM

## 2022-04-22 DIAGNOSIS — Z74.09 IMPAIRED FUNCTIONAL MOBILITY, BALANCE, GAIT, AND ENDURANCE: ICD-10-CM

## 2022-04-22 DIAGNOSIS — Z78.9 IMPAIRED MOBILITY AND ADLS: ICD-10-CM

## 2022-04-22 LAB
ALBUMIN SERPL-MCNC: 3.4 G/DL (ref 3.5–5.2)
ALBUMIN/GLOB SERPL: 1.1 G/DL
ALP SERPL-CCNC: 84 U/L (ref 39–117)
ALT SERPL W P-5'-P-CCNC: 35 U/L (ref 1–41)
ANION GAP SERPL CALCULATED.3IONS-SCNC: 10 MMOL/L (ref 5–15)
AST SERPL-CCNC: 22 U/L (ref 1–40)
BASOPHILS # BLD AUTO: 0.03 10*3/MM3 (ref 0–0.2)
BASOPHILS NFR BLD AUTO: 0.6 % (ref 0–1.5)
BILIRUB SERPL-MCNC: 0.5 MG/DL (ref 0–1.2)
BUN SERPL-MCNC: 13 MG/DL (ref 6–20)
BUN/CREAT SERPL: 14.6 (ref 7–25)
CALCIUM SPEC-SCNC: 8.5 MG/DL (ref 8.6–10.5)
CHLORIDE SERPL-SCNC: 105 MMOL/L (ref 98–107)
CO2 SERPL-SCNC: 24 MMOL/L (ref 22–29)
CREAT SERPL-MCNC: 0.89 MG/DL (ref 0.76–1.27)
CRP SERPL-MCNC: 2.16 MG/DL (ref 0–0.5)
DEPRECATED RDW RBC AUTO: 47.2 FL (ref 37–54)
EGFRCR SERPLBLD CKD-EPI 2021: 104.4 ML/MIN/1.73
EOSINOPHIL # BLD AUTO: 0.35 10*3/MM3 (ref 0–0.4)
EOSINOPHIL NFR BLD AUTO: 7 % (ref 0.3–6.2)
ERYTHROCYTE [DISTWIDTH] IN BLOOD BY AUTOMATED COUNT: 15.4 % (ref 12.3–15.4)
GLOBULIN UR ELPH-MCNC: 3 GM/DL
GLUCOSE BLDC GLUCOMTR-MCNC: 104 MG/DL (ref 70–130)
GLUCOSE BLDC GLUCOMTR-MCNC: 127 MG/DL (ref 70–130)
GLUCOSE SERPL-MCNC: 109 MG/DL (ref 65–99)
HCT VFR BLD AUTO: 36.4 % (ref 37.5–51)
HGB BLD-MCNC: 11.7 G/DL (ref 13–17.7)
HOLD SPECIMEN: NORMAL
IMM GRANULOCYTES # BLD AUTO: 0.03 10*3/MM3 (ref 0–0.05)
IMM GRANULOCYTES NFR BLD AUTO: 0.6 % (ref 0–0.5)
LYMPHOCYTES # BLD AUTO: 1.48 10*3/MM3 (ref 0.7–3.1)
LYMPHOCYTES NFR BLD AUTO: 29.6 % (ref 19.6–45.3)
MCH RBC QN AUTO: 27.1 PG (ref 26.6–33)
MCHC RBC AUTO-ENTMCNC: 32.1 G/DL (ref 31.5–35.7)
MCV RBC AUTO: 84.3 FL (ref 79–97)
MONOCYTES # BLD AUTO: 0.42 10*3/MM3 (ref 0.1–0.9)
MONOCYTES NFR BLD AUTO: 8.4 % (ref 5–12)
NEUTROPHILS NFR BLD AUTO: 2.69 10*3/MM3 (ref 1.7–7)
NEUTROPHILS NFR BLD AUTO: 53.8 % (ref 42.7–76)
NRBC BLD AUTO-RTO: 0 /100 WBC (ref 0–0.2)
PLATELET # BLD AUTO: 195 10*3/MM3 (ref 140–450)
PMV BLD AUTO: 10.5 FL (ref 6–12)
POTASSIUM SERPL-SCNC: 3.7 MMOL/L (ref 3.5–5.2)
PROT SERPL-MCNC: 6.4 G/DL (ref 6–8.5)
RBC # BLD AUTO: 4.32 10*6/MM3 (ref 4.14–5.8)
SODIUM SERPL-SCNC: 139 MMOL/L (ref 136–145)
VANCOMYCIN TROUGH SERPL-MCNC: 18.8 MCG/ML (ref 5–20)
WBC NRBC COR # BLD: 5 10*3/MM3 (ref 3.4–10.8)

## 2022-04-22 PROCEDURE — 82962 GLUCOSE BLOOD TEST: CPT

## 2022-04-22 PROCEDURE — 85025 COMPLETE CBC W/AUTO DIFF WBC: CPT | Performed by: NURSE PRACTITIONER

## 2022-04-22 PROCEDURE — 97166 OT EVAL MOD COMPLEX 45 MIN: CPT

## 2022-04-22 PROCEDURE — 80202 ASSAY OF VANCOMYCIN: CPT | Performed by: INTERNAL MEDICINE

## 2022-04-22 PROCEDURE — 97162 PT EVAL MOD COMPLEX 30 MIN: CPT

## 2022-04-22 PROCEDURE — 25010000002 VANCOMYCIN 10 G RECONSTITUTED SOLUTION: Performed by: INTERNAL MEDICINE

## 2022-04-22 PROCEDURE — 63710000001 DIPHENHYDRAMINE PER 50 MG: Performed by: NURSE PRACTITIONER

## 2022-04-22 PROCEDURE — G0378 HOSPITAL OBSERVATION PER HR: HCPCS

## 2022-04-22 PROCEDURE — 96366 THER/PROPH/DIAG IV INF ADDON: CPT

## 2022-04-22 PROCEDURE — 86140 C-REACTIVE PROTEIN: CPT | Performed by: NURSE PRACTITIONER

## 2022-04-22 PROCEDURE — 80053 COMPREHEN METABOLIC PANEL: CPT | Performed by: NURSE PRACTITIONER

## 2022-04-22 PROCEDURE — 25010000002 CEFEPIME PER 500 MG: Performed by: INTERNAL MEDICINE

## 2022-04-22 RX ORDER — SODIUM CHLORIDE 0.9 % (FLUSH) 0.9 %
3 SYRINGE (ML) INJECTION AS NEEDED
Status: DISCONTINUED | OUTPATIENT
Start: 2022-04-22 | End: 2022-05-03 | Stop reason: HOSPADM

## 2022-04-22 RX ORDER — SODIUM CHLORIDE 0.9 % (FLUSH) 0.9 %
3 SYRINGE (ML) INJECTION EVERY 12 HOURS
Status: DISCONTINUED | OUTPATIENT
Start: 2022-04-22 | End: 2022-05-03 | Stop reason: HOSPADM

## 2022-04-22 RX ORDER — ONDANSETRON 4 MG/1
4 TABLET, FILM COATED ORAL EVERY 6 HOURS PRN
Status: DISCONTINUED | OUTPATIENT
Start: 2022-04-22 | End: 2022-05-03 | Stop reason: HOSPADM

## 2022-04-22 RX ORDER — PANTOPRAZOLE SODIUM 40 MG/1
40 TABLET, DELAYED RELEASE ORAL DAILY PRN
Status: DISCONTINUED | OUTPATIENT
Start: 2022-04-22 | End: 2022-05-03 | Stop reason: HOSPADM

## 2022-04-22 RX ORDER — HYDROCODONE BITARTRATE AND ACETAMINOPHEN 5; 325 MG/1; MG/1
1 TABLET ORAL EVERY 6 HOURS PRN
Status: DISCONTINUED | OUTPATIENT
Start: 2022-04-22 | End: 2022-05-03 | Stop reason: HOSPADM

## 2022-04-22 RX ORDER — ACETAMINOPHEN 325 MG/1
650 TABLET ORAL EVERY 4 HOURS PRN
Status: DISCONTINUED | OUTPATIENT
Start: 2022-04-22 | End: 2022-05-03 | Stop reason: HOSPADM

## 2022-04-22 RX ORDER — ROSUVASTATIN CALCIUM 10 MG/1
20 TABLET, COATED ORAL DAILY
Status: DISCONTINUED | OUTPATIENT
Start: 2022-04-23 | End: 2022-05-03 | Stop reason: HOSPADM

## 2022-04-22 RX ORDER — HYDROXYZINE HYDROCHLORIDE 25 MG/1
25 TABLET, FILM COATED ORAL 4 TIMES DAILY PRN
Status: DISCONTINUED | OUTPATIENT
Start: 2022-04-22 | End: 2022-04-27

## 2022-04-22 RX ORDER — LOSARTAN POTASSIUM 25 MG/1
25 TABLET ORAL DAILY
Status: DISCONTINUED | OUTPATIENT
Start: 2022-04-23 | End: 2022-05-03 | Stop reason: HOSPADM

## 2022-04-22 RX ORDER — PANTOPRAZOLE SODIUM 40 MG/1
40 TABLET, DELAYED RELEASE ORAL
Status: DISCONTINUED | OUTPATIENT
Start: 2022-04-23 | End: 2022-04-22

## 2022-04-22 RX ADMIN — CEFEPIME HYDROCHLORIDE 2 G: 2 INJECTION, POWDER, FOR SOLUTION INTRAVENOUS at 02:04

## 2022-04-22 RX ADMIN — CEFEPIME HYDROCHLORIDE 2 G: 2 INJECTION, POWDER, FOR SOLUTION INTRAVENOUS at 10:29

## 2022-04-22 RX ADMIN — Medication 1 APPLICATION: at 09:56

## 2022-04-22 RX ADMIN — FUROSEMIDE 40 MG: 40 TABLET ORAL at 09:56

## 2022-04-22 RX ADMIN — PANTOPRAZOLE SODIUM 40 MG: 40 TABLET, DELAYED RELEASE ORAL at 05:51

## 2022-04-22 RX ADMIN — ROSUVASTATIN CALCIUM 20 MG: 20 TABLET, FILM COATED ORAL at 09:56

## 2022-04-22 RX ADMIN — VANCOMYCIN HYDROCHLORIDE 2000 MG: 10 INJECTION, POWDER, LYOPHILIZED, FOR SOLUTION INTRAVENOUS at 12:30

## 2022-04-22 RX ADMIN — DIPHENHYDRAMINE HYDROCHLORIDE 25 MG: 25 CAPSULE ORAL at 10:30

## 2022-04-22 RX ADMIN — Medication 10 ML: at 09:56

## 2022-04-22 RX ADMIN — LOSARTAN POTASSIUM 25 MG: 25 TABLET, FILM COATED ORAL at 09:56

## 2022-04-22 NOTE — PROGRESS NOTES
"  Pharmacokinetics by Pharmacy - Vancomycin    Jalil Camarillo is a 51 y.o. male  [Ht: 190.5 cm (75\"); Wt: (!) 155 kg (342 lb 11.2 oz)] is being treated for skin and soft tissue infection, day 3 of therapy.    Estimated Creatinine Clearance: 158.7 mL/min (by C-G formula based on SCr of 0.89 mg/dL).   Creatinine   Date Value Ref Range Status   04/22/2022 0.89 0.76 - 1.27 mg/dL Final   04/21/2022 0.90 0.76 - 1.27 mg/dL Final   04/20/2022 0.92 0.76 - 1.27 mg/dL Final   04/07/2022 0.8 0.7 - 1.3 mg/dL Final   04/06/2022 0.8 0.7 - 1.3 mg/dL Final   04/05/2022 0.8 0.7 - 1.3 mg/dL Final      Lab Results   Component Value Date    WBC 5.00 04/22/2022    WBC 6.44 04/21/2022    WBC 5.82 04/20/2022     C-Reactive Protein   Date Value Ref Range Status   04/22/2022 2.16 (H) 0.00 - 0.50 mg/dL Final   04/21/2022 0.99 (H) 0.00 - 0.50 mg/dL Final   04/20/2022 0.91 (H) 0.00 - 0.50 mg/dL Final   04/06/2022 1.6 (H) 0.0 - 0.5 mg/dL Final   04/03/2022 1.5 (H) 0.0 - 0.5 mg/dL Final   01/06/2022 3.7 (H) 0.0 - 0.5 mg/dL Final     Lactate   Date Value Ref Range Status   04/21/2022 1.2 0.5 - 2.0 mmol/L Final   04/20/2022 1.2 0.5 - 2.0 mmol/L Final   03/23/2022 1.3 0.5 - 2.0 mmol/L Final       Temp Readings from Last 1 Encounters:   04/22/22 96.7 °F (35.9 °C) (Oral)      Lab Results   Component Value Date    VANCOPEAK 47.40 (C) 02/27/2022    VANCOTROUGH 18.80 04/22/2022    VANCORANDOM 21.0 04/05/2022         Culture Results:  Microbiology Results (last 10 days)       Procedure Component Value - Date/Time    MRSA Screen, PCR (Inpatient) - Swab, Nares [[id number]]  (Normal) Collected: 04/20/22 2325    Lab Status: Final result Specimen: Swab from Nares Updated: 04/21/22 0115     MRSA, PCR Negative    Narrative:      Performed by real-time polymerase chain reaction (qPCR).    Blood Culture - Blood, Arm, Right [[id number]]  (Normal) Collected: 04/20/22 2225    Lab Status: Preliminary result Specimen: Blood from Arm, Right Updated: 04/21/22 2233     " Blood Culture No growth at 24 hours    COVID-19 and FLU A/B PCR - Swab, Nasopharynx [[id number]]  (Normal) Collected: 04/20/22 2158    Lab Status: Final result Specimen: Swab from Nasopharynx Updated: 04/20/22 2256     COVID19 Not Detected     Influenza A PCR Not Detected     Influenza B PCR Not Detected    Narrative:      Fact sheet for providers: https://www.fda.gov/media/633316/download    Fact sheet for patients: https://www.fda.gov/media/427367/download    Test performed by PCR.    Blood Culture - Blood, Arm, Left [[id number]]  (Normal) Collected: 04/20/22 2029    Lab Status: Preliminary result Specimen: Blood from Arm, Left Updated: 04/21/22 2047     Blood Culture No growth at 24 hours          No results found for: RESPCX    Indication for use: skin and soft tissue infection      Current Vancomycin Dose:  2000 mg IVPB every 12 hours, day 3 of therapy.     Pt is also receiving Cefepime    Assessment/Plan:  Reviewed above labs and cultures.   Vancomycin trough level from 4/22 at 1030 is 18.8 (goal 10-15). Lab levels were drawn at the correct time. WBC is 5,in goal range. Cr is 0.89, stable. Will decrease current dose to 1500 mg IV every 12 hours to start at 1430. This will give a trough level of around 14.4.  Pt is transferring to [facility]. Order levels as clinically indicated.  Pharmacy will continue to monitor renal function and adjust dose accordingly.    Mary Kendall, PharmD   04/22/22 13:22 CDT

## 2022-04-22 NOTE — PLAN OF CARE
Goal Outcome Evaluation:  Plan of Care Reviewed With: patient      OT eval completed; co-eval with PT. Pt pleasant and cooperative. Pt required CGA for bed mobility, transfers, and ambulation with RW. Pt (D) for donning socks.Pt presents with increased pain in BLE and overall decreased activity tolerance. Pt would benefit from continued skilled OT services to address deficits and promote return to highest level of functioning. Pt lives with adult children and has good support. Rec home with assist at d/c.

## 2022-04-22 NOTE — THERAPY EVALUATION
Patient Name: Jalil Camarillo  : 1970    MRN: [id number]                              Today's Date: 2022       Admit Date: 2022    Visit Dx:     ICD-10-CM ICD-9-CM   1. Cellulitis, unspecified cellulitis site  L03.90 682.9   2. Impaired mobility and ADLs  Z74.09 V49.89    Z78.9    3. Impaired functional mobility, balance, gait, and endurance  Z74.09 V49.89     Patient Active Problem List   Diagnosis   • Localized edema   • Hilar mass   • Morbid obesity (HCC)   • Chronic cellulitis   • Lymphedema   • Chronic venous stasis   • Cellulitis of right lower extremity   • Cellulitis     Past Medical History:   Diagnosis Date   • Chronic cellulitis    • Chronic venous stasis    • COPD (chronic obstructive pulmonary disease) ([facility])    • GERD (gastroesophageal reflux disease)    • Hyperlipidemia    • Hypertension    • Hypothyroidism    • Lymphedema    • Morbid obesity (Former[facility])    • Osteoarthritis      Past Surgical History:   Procedure Laterality Date   • HERNIA REPAIR Right       General Information     Row Name 22 0850          Physical Therapy Time and Intention    Document Type evaluation  -LR     Mode of Treatment co-treatment;physical therapy;occupational therapy  -LR     Row Name 22 0850          General Information    Patient Profile Reviewed yes  -LR     Prior Level of Function independent:;all household mobility;community mobility;gait  -LR     Existing Precautions/Restrictions fall  -LR     Barriers to Rehab previous functional deficit  -LR     Row Name 22 0850          Living Environment    People in Home child(mary), adult  -LR     Row Name 22 0850          Home Main Entrance    Number of Stairs, Main Entrance none  -LR     Row Name 22 0850          Stairs Within Home, Primary    Stairs, Within Home, Primary RW, SPC, tub/shower no chair  -LR     Number of Stairs, Within Home, Primary none  -LR     Row Name 22 0850          Cognition    Orientation Status  (Cognition) oriented to;person;place;situation;verbal cues/prompts needed for orientation;time  -LR     Row Name 04/22/22 0850          Safety Issues, Functional Mobility    Safety Issues Affecting Function (Mobility) at risk behavior observed;awareness of need for assistance;safety precautions follow-through/compliance;positioning of assistive device;safety precaution awareness  -LR     Impairments Affecting Function (Mobility) balance;coordination;endurance/activity tolerance;pain;strength  -LR           User Key  (r) = Recorded By, (t) = Taken By, (c) = Cosigned By    Initials Name Provider Type    LR Fei Lind Physical Therapist               Mobility     Row Name 04/22/22 0850          Bed Mobility    Bed Mobility supine-sit  -LR     Supine-Sit [place] (Bed Mobility) contact guard  -LR     Row Name 04/22/22 0850          Bed-Chair Transfer    Bed-Chair [place] (Transfers) contact guard  -LR     Row Name 04/22/22 0850          Sit-Stand Transfer    Sit-Stand [place] (Transfers) contact guard  -LR     Row Name 04/22/22 0850          Gait/Stairs (Locomotion)    [place] Level (Gait) contact guard  -LR     Assistive Device (Gait) walker, front-wheeled  -LR     Distance in Feet (Gait) 50'x1  -LR     Deviations/Abnormal Patterns (Gait) antalgic;griselda decreased;festinating/shuffling;gait speed decreased  -LR           User Key  (r) = Recorded By, (t) = Taken By, (c) = Cosigned By    Initials Name Provider Type    LR Fei Lind Physical Therapist               Obj/Interventions     Row Name 04/22/22 0850          Range of Motion Comprehensive    General Range of Motion no range of motion deficits identified  -LR     Comment, General Range of Motion Limited by LE swelling.  -LR     Row Name 04/22/22 0850          Strength Comprehensive (MMT)    Comment, General Manual Muscle Testing (MMT) Assessment BLE grossly 4/5  -LR     Row Name 04/22/22 0850          Sensory Assessment  (Somatosensory)    Sensory Assessment (Somatosensory) sensation intact;LE sensation intact  reports some tingling in anterior foot  -LR           User Key  (r) = Recorded By, (t) = Taken By, (c) = Cosigned By    Initials Name Provider Type    Fei Vega Physical Therapist               Goals/Plan     Row Name 04/22/22 0850          Bed Mobility Goal 1 (PT)    Activity/Assistive Device (Bed Mobility Goal 1, PT) sit to supine;supine to sit  -LR     [place] Level/Cues Needed (Bed Mobility Goal 1, PT) independent  -LR     Time Frame (Bed Mobility Goal 1, PT) by discharge  -LR     Progress/Outcomes (Bed Mobility Goal 1, PT) goal not met  -LR     Row Name 04/22/22 0850          Transfer Goal 1 (PT)    Activity/Assistive Device (Transfer Goal 1, PT) sit-to-stand/stand-to-sit;bed-to-chair/chair-to-bed  -LR     [place] Level/Cues Needed (Transfer Goal 1, PT) modified independence  -LR     Time Frame (Transfer Goal 1, PT) by discharge  -LR     Progress/Outcome (Transfer Goal 1, PT) goal not met  -LR     Row Name 04/22/22 0850          Gait Training Goal 1 (PT)    Activity/Assistive Device (Gait Training Goal 1, PT) gait (walking locomotion);assistive device use  -LR     [place] Level (Gait Training Goal 1, PT) modified independence  -LR     Distance (Gait Training Goal 1, PT) 50'x3  -LR     Time Frame (Gait Training Goal 1, PT) by discharge  -LR     Progress/Outcome (Gait Training Goal 1, PT) goal not met  -LR     Row Name 04/22/22 0850          Therapy Assessment/Plan (PT)    Planned Therapy Interventions (PT) balance training;neuromuscular re-education;bed mobility training;orthotic fitting/training;transfer training;patient/family education;vestibular therapy;gait training;home exercise program;joint mobilization;postural re-education;wheelchair management/propulsion training;prosthetic fitting/training;lumbar stabilization;manual therapy techniques;ROM (range of motion);stair  training;strengthening;stretching  -LR           User Key  (r) = Recorded By, (t) = Taken By, (c) = Cosigned By    Initials Name Provider Type    LR Fei Lind Physical Therapist               Clinical Impression     Row Name 04/22/22 0850          Pain    Pretreatment Pain Rating 9/10  -LR     Posttreatment Pain Rating 9/10  -LR     Pain Location - Side/Orientation Bilateral  -LR     Pain Location lower  -LR     Pain Location - extremity  -LR     Pain Intervention(s) Repositioned;Ambulation/increased activity  -LR     Row Name 04/22/22 0850          Plan of Care Review    Plan of Care Reviewed With patient  -LR     Outcome Evaluation PT eval completed. Patient pleasant and cooperative. Does not report any falls in the past 6 months. Bed mobility: CGA for supine>sit transfer Transfers: CGA for sit<>stand with RW Gait: CGA for ambulation 50'x1 with RW. Patient ambulation limited by pain. Patient presents with antalgic pattern and decreased gait speed. Balance: 23/28 on Tinetti balance and gait assessment which indicates moderate fall risk. Patient reports he has never had education on lymphadema management.  -LR     Row Name 04/22/22 0850          Therapy Assessment/Plan (PT)    Patient/Family Therapy Goals Statement (PT) Patient wants to return home.  -LR     Rehab Potential (PT) good, to achieve stated therapy goals  -LR     Criteria for Skilled Interventions Met (PT) yes;meets criteria;skilled treatment is necessary  -LR     Therapy Frequency (PT) other (see comments)  3-7d/week  -LR     Predicted Duration of Therapy Intervention (PT) Until d/c or until all goals met  -LR     Row Name 04/22/22 0850          Vital Signs    Pre Systolic BP Rehab 113  -LR     Pre Treatment Diastolic BP 59  -LR     Pretreatment Heart Rate (beats/min) 88  -LR     Pre SpO2 (%) 98  -LR     O2 Delivery Pre Treatment room air  -LR     Pre Patient Position Supine  -LR     Row Name 04/22/22 0850          Positioning and Restraints     "Pre-Treatment Position in bed  -LR     Post Treatment Position chair  -LR     In Chair notified nsg;call light within reach;encouraged to call for assist;exit alarm on  -LR           User Key  (r) = Recorded By, (t) = Taken By, (c) = Cosigned By    Initials Name Provider Type    LR Fei Lind Physical Therapist               Outcome Measures     Row Name 04/22/22 0850          How much help from another person do you currently need...    Turning from your back to your side while in flat bed without using bedrails? 3  -LR     Moving from lying on back to sitting on the side of a flat bed without bedrails? 3  -LR     Moving to and from a bed to a chair (including a wheelchair)? 3  -LR     Standing up from a chair using your arms (e.g., wheelchair, bedside chair)? 3  -LR     Climbing 3-5 steps with a railing? 3  -LR     To walk in hospital room? 3  -LR     AM-PAC 6 Clicks Score (PT) 18  -LR     Row Name 04/22/22 0850          Tinetti Assessment    Sitting Balance 1  -LR     Arises 1  -LR     Attempts to Rise 2  -LR     Immediate Standing Balance (first 5 sec) 1  -LR     Standing Balance 2  -LR     Sternal Nudge (feet close together) 2  -LR     Eyes Closed (feet close together) 1  -LR     Turning 360 Degrees- Steps 1  -LR     Turning 360 Degrees- Steadiness 1  -LR     Sitting Down 2  -LR     Tinetti Balance Score 14  -LR     Gait Initiation (immediate after told \"go\") 1  -LR     Step Length- Right Swing 1  -LR     Step Length- Left Swing 1  -LR     Foot Clearance- Right Foot 1  -LR     Foot Clearance- Left Foot 1  -LR     Step Symmetry 1  -LR     Step Continuity 1  -LR     Path (excursion) 1  -LR     Trunk 0  -LR     Base of Support 1  -LR     Gait Score 9  -LR     T[place]etti Total Score 23  -LR     Row Name 04/22/22 0851 04/22/22 0850       Functional Assessment    Outcome Measure Options AM-PAC 6 Clicks Daily Activity (OT)  -AS AM-PAC 6 Clicks Basic Mobility (PT);Tinetti  -LR          User Key  (r) = Recorded By, " (t) = Taken By, (c) = Cosigned By    Initials Name Provider Type    AS Peg Antonio OT Occupational Therapist    LR Fei Lind Physical Therapist                             Physical Therapy Education                 Title: PT OT SLP Therapies (In Progress)     Topic: Physical Therapy (Done)     Point: Mobility training (Done)     Learning Progress Summary           Patient Acceptance, E,TB, VU by LR at 4/22/2022 1303    Comment: educated on PT POC and goals.                   Point: Home exercise program (Done)     Learning Progress Summary           Patient Acceptance, E,TB, VU by LR at 4/22/2022 1303    Comment: educated on PT POC and goals.                   Point: Body mechanics (Done)     Learning Progress Summary           Patient Acceptance, E,TB, VU by LR at 4/22/2022 1303    Comment: educated on PT POC and goals.                   Point: Precautions (Done)     Learning Progress Summary           Patient Acceptance, E,TB, VU by LR at 4/22/2022 1303    Comment: educated on PT POC and goals.                               User Key     Initials Effective Dates Name Provider Type Discipline     06/16/21 -  Fei Lind Physical Therapist PT              PT Recommendation and Plan  Planned Therapy Interventions (PT): balance training, neuromuscular re-education, bed mobility training, orthotic fitting/training, transfer training, patient/family education, vestibular therapy, gait training, home exercise program, joint mobilization, postural re-education, wheelchair management/propulsion training, prosthetic fitting/training, lumbar stabilization, manual therapy techniques, ROM (range of motion), stair training, strengthening, stretching  Plan of Care Reviewed With: patient  Outcome Evaluation: PT eval completed. Patient pleasant and cooperative. Does not report any falls in the past 6 months. Bed mobility: CGA for supine>sit transfer Transfers: CGA for sit<>stand with RW Gait: CGA for ambulation  50'x1 with RW. Patient ambulation limited by pain. Patient presents with antalgic pattern and decreased gait speed. Balance: 23/28 on Tinetti balance and gait assessment which indicates moderate fall risk. Patient reports he has never had education on lymphadema management.     Time Calculation:    PT Charges     Row Name 04/22/22 1304             Time Calculation    Start Time 0850  -LR      Stop Time 0915  -LR      Time Calculation (min) 25 min  -LR      PT Received On 04/22/22  -LR      PT Goal Re-Cert Due Date 05/05/22  -LR              Time Calculation- PT    Total Timed Code Minutes- PT 25 minute(s)  -LR              Untimed Charges    PT Eval/Re-eval Minutes 25  -LR              Total Minutes    Untimed Charges Total Minutes 25  -LR       Total Minutes 25  -LR            User Key  (r) = Recorded By, (t) = Taken By, (c) = Cosigned By    Initials Name Provider Type    LR Fei Lind Physical Therapist              Therapy Charges for Today     Code Description Service Date Service Provider Modifiers Qty    [id number] HC PT EVAL MOD COMPLEXITY 2 4/22/2022 Fei Lind GP 1          PT G-Codes  Outcome Measure Options: AM-PAC 6 Clicks Daily Activity (OT)  AM-PAC 6 Clicks Score (PT): 18  AM-PAC 6 Clicks Score (OT): 18  Tinetti Total Score: 23    Fei Lind  4/22/2022

## 2022-04-22 NOTE — DISCHARGE SUMMARY
[facility] Medicine Services  DISCHARGE SUMMARY       Date of Admission: 4/20/2022  Date of Discharge:  4/22/2022  Primary Care Physician: Ethan Casey MD    Presenting Problem/History of Present Illness:  Cellulitis, unspecified cellulitis site [L03.90]     Final Discharge Diagnoses:  Active Hospital Problems    Diagnosis    • Cellulitis        Consults:   Consults     No orders found from 3/22/2022 to 4/21/2022.        Pertinent Test Results:   Lab Results (last 24 hours)     Procedure Component Value Units Date/Time    POC Glucose Once [[id number]]  (Normal) Collected: 04/22/22 1108    Specimen: Blood Updated: 04/22/22 1146     Glucose 104 mg/dL      Comment: RN NotifiedOperator: [id number] ISABELLA PlanStanMeter ID: [id number]       Vancomycin, Trough [[id number]]  (Normal) Collected: 04/22/22 1018    Specimen: Blood Updated: 04/22/22 1059     Vancomycin Trough 18.80 mcg/mL     Extra Tubes [[id number]] Collected: 04/22/22 0616    Specimen: Blood, Venous Line Updated: 04/22/22 0903    Narrative:      The following orders were created for panel order Extra Tubes.  Procedure                               Abnormality         Status                     ---------                               -----------         ------                     Gold Top - SST[[id number]]                                   Final result                 Please view results for these tests on the individual orders.    Gold Top - SST [[id number]] Collected: 04/22/22 0616    Specimen: Blood Updated: 04/22/22 0903     Extra Tube Hold for add-ons.     Comment: Auto resulted.       POC Glucose Once [[id number]]  (Normal) Collected: 04/22/22 0714    Specimen: Blood Updated: 04/22/22 0736     Glucose 127 mg/dL      Comment: RN NotifiedOperator: [id number] ISABELLA PlanStanMeter ID: [id number]       Comprehensive Metabolic Panel [[id number]]  (Abnormal) Collected: 04/22/22 0616    Specimen: Blood Updated: 04/22/22 0701     Glucose 109 mg/dL       BUN 13 mg/dL      Creatinine 0.89 mg/dL      Sodium 139 mmol/L      Potassium 3.7 mmol/L      Chloride 105 mmol/L      CO2 24.0 mmol/L      Calcium 8.5 mg/dL      Total Protein 6.4 g/dL      Albumin 3.40 g/dL      ALT (SGPT) 35 U/L      AST (SGOT) 22 U/L      Alkaline Phosphatase 84 U/L      Total Bilirubin 0.5 mg/dL      Globulin 3.0 gm/dL      A/G Ratio 1.1 g/dL      BUN/Creatinine Ratio 14.6     Anion Gap 10.0 mmol/L      eGFR 104.4 mL/min/1.73      Comment: National Kidney Foundation and American Society of Nephrology (ASN) Task Force recommended calculation based on the Chronic Kidney Disease Epidemiology Collaboration (CKD-EPI) equation refit without adjustment for race.       Narrative:      GFR Normal >60  Chronic Kidney Disease <60  Kidney Failure <15      C-reactive Protein [[id number]]  (Abnormal) Collected: 04/22/22 0616    Specimen: Blood Updated: 04/22/22 0701     C-Reactive Protein 2.16 mg/dL     CBC & Differential [[id number]]  (Abnormal) Collected: 04/22/22 0616    Specimen: Blood Updated: 04/22/22 0640    Narrative:      The following orders were created for panel order CBC & Differential.  Procedure                               Abnormality         Status                     ---------                               -----------         ------                     CBC Auto Differential[[id number]]        Abnormal            Final result                 Please view results for these tests on the individual orders.    CBC Auto Differential [[id number]]  (Abnormal) Collected: 04/22/22 0616    Specimen: Blood Updated: 04/22/22 0640     WBC 5.00 10*3/mm3      RBC 4.32 10*6/mm3      Hemoglobin 11.7 g/dL      Hematocrit 36.4 %      MCV 84.3 fL      MCH 27.1 pg      MCHC 32.1 g/dL      RDW 15.4 %      RDW-SD 47.2 fl      MPV 10.5 fL      Platelets 195 10*3/mm3      Neutrophil % 53.8 %      Lymphocyte % 29.6 %      Monocyte % 8.4 %      Eosinophil % 7.0 %      Basophil % 0.6 %      Immature Grans % 0.6 %       Neutrophils, Absolute 2.69 10*3/mm3      Lymphocytes, Absolute 1.48 10*3/mm3      Monocytes, Absolute 0.42 10*3/mm3      Eosinophils, Absolute 0.35 10*3/mm3      Basophils, Absolute 0.03 10*3/mm3      Immature Grans, Absolute 0.03 10*3/mm3      nRBC 0.0 /100 WBC     Blood Culture - Blood, Arm, Right [[id number]]  (Normal) Collected: 04/20/22 2225    Specimen: Blood from Arm, Right Updated: 04/21/22 2233     Blood Culture No growth at 24 hours    Blood Culture - Blood, Arm, Left [[id number]]  (Normal) Collected: 04/20/22 2029    Specimen: Blood from Arm, Left Updated: 04/21/22 2047     Blood Culture No growth at 24 hours    POC Glucose Once [[id number]]  (Normal) Collected: 04/21/22 1920    Specimen: Blood Updated: 04/21/22 1951     Glucose 101 mg/dL      Comment: RN NotifiedOperator: [id number] BRYANT MCCALLUMMeter ID: [id number]       C-reactive Protein [[id number]]  (Abnormal) Collected: 04/21/22 0458    Specimen: Blood Updated: 04/21/22 1702     C-Reactive Protein 0.99 mg/dL     POC Glucose Once [[id number]]  (Normal) Collected: 04/21/22 1607    Specimen: Blood Updated: 04/21/22 1633     Glucose 83 mg/dL      Comment: RN NotifiedOperator: [id number] DAMIEN GARCIAMeter ID: [id number]           Imaging Results (Last 24 Hours)     ** No results found for the last 24 hours. **        Chief Complaint on Day of Discharge: No complaints    Hospital Course:  This is a 51-year-old male with past medical history of bilateral lower extremity lymphedema, chronic cellulitis, oral stasis, COPD, GERD, HTN, HL and hypothyroidism that presented to [facility] on 4/20/2022 with complaints of worsening redness of his bilateral lower extremities.      The patient has had multiple admissions between [facility] and [facility] since the beginning of the year.    The patient was started on IV antibiotics.  He was accepted to [facility] for continuation of IV antibiotics.     Condition on Discharge:   "Stable    Physical Exam on Discharge:  /61 (BP Location: Left arm, Patient Position: Sitting)   Pulse 77   Temp 96.7 °F (35.9 °C) (Oral)   Resp 20   Ht 190.5 cm (75\")   Wt (!) 155 kg (342 lb 11.2 oz)   SpO2 98%   BMI 42.83 kg/m²   Physical Exam  Constitutional:       Appearance: He is well-developed.   HENT:      Head: Normocephalic and atraumatic.   Eyes:      Pupils: Pupils are equal, round, and reactive to light.   Cardiovascular:      Rate and Rhythm: Normal rate and regular rhythm.   Pulmonary:      Effort: Pulmonary effort is normal.      Breath sounds: Normal breath sounds.   Abdominal:      General: Bowel sounds are normal.      Palpations: Abdomen is soft.   Musculoskeletal:         General: Normal range of motion.      Cervical back: Normal range of motion and neck supple.   Skin:     General: Skin is warm and dry.   Neurological:      Mental Status: He is alert and oriented to person, place, and time.   Psychiatric:         Behavior: Behavior normal.       Discharge Disposition:  Long Term Care ([facility])    Discharge Medications:     Discharge Medications      New Medications      Instructions Start Date   cefepime 2 G/ ML solution  Commonly known as: MAXIPIME   2 g, Intravenous, Every 8 Hours         Continue These Medications      Instructions Start Date   losartan 25 MG tablet  Commonly known as: COZAAR   25 mg, Oral, Daily      PRILOSEC PO   20 mg, Oral, Daily PRN      rosuvastatin 20 MG tablet  Commonly known as: CRESTOR   20 mg, Oral, Daily         Stop These Medications    IBUPROFEN PO            Discharge Diet:   Diet Instructions     Diet: Consistent Carbohydrate      Discharge Diet: Consistent Carbohydrate          Activity at Discharge:   Activity Instructions     Activity as Tolerated            Discharge Care Plan/Instructions: As above.     Follow-up Appointment:   Follow-up Information     Ethan Casey MD .    Specialty: Internal Medicine  Contact " information:  [address] [facility]  [place] [address]  [phone]                         Test Results Pending at Discharge:   Pending Labs     Order Current Status    Blood Culture - Blood, Arm, Left Preliminary result    Blood Culture - Blood, Arm, Right Preliminary result            This document has been electronically signed by HALINA Vazquez on April 22, 2022 12:41 CDT        Time: Greater than 30 minutes.

## 2022-04-22 NOTE — THERAPY EVALUATION
Patient Name: Jalil Camarillo  : 1970    MRN: [id number]                              Today's Date: 2022       Admit Date: 2022    Visit Dx:     ICD-10-CM ICD-9-CM   1. Cellulitis, unspecified cellulitis site  L03.90 682.9   2. Impaired mobility and ADLs  Z74.09 V49.89    Z78.9      Patient Active Problem List   Diagnosis   • Localized edema   • Hilar mass   • Morbid obesity (HCC)   • Chronic cellulitis   • Lymphedema   • Chronic venous stasis   • Cellulitis of right lower extremity   • Cellulitis     Past Medical History:   Diagnosis Date   • Chronic cellulitis    • Chronic venous stasis    • COPD (chronic obstructive pulmonary disease) (HCC)    • GERD (gastroesophageal reflux disease)    • Hyperlipidemia    • Hypertension    • Hypothyroidism    • Lymphedema    • Morbid obesity (HCC)    • Osteoarthritis      Past Surgical History:   Procedure Laterality Date   • HERNIA REPAIR Right       General Information     Row Name 2251          OT Time and Intention    Document Type evaluation  -AS     Mode of Treatment co-treatment;physical therapy;occupational therapy  -AS     Row Name 22          General Information    Patient Profile Reviewed yes  -AS     Prior Level of Function independent:;gait;transfer;ADL's;dependent:;cooking;cleaning;home management  -AS     Existing Precautions/Restrictions fall  -AS     Barriers to Rehab previous functional deficit  -AS     Row Name 22          Living Environment    People in Home child(mary), adult  -AS     Row Name 22          Home Main Entrance    Number of Stairs, Main Entrance none  -AS     Row Name 22          Stairs Within Home, Primary    Stairs, Within Home, Primary RW, SPC, tub/shower combo; uses RW in house and cane in community  -AS     Number of Stairs, Within Home, Primary none  -AS     Row Name 22          Cognition    Orientation Status (Cognition) oriented  to;person;place;situation;verbal cues/prompts needed for orientation;time  -AS     [facility] Name 04/22/22 0851          Safety Issues, Functional Mobility    Impairments Affecting Function (Mobility) balance;coordination;endurance/activity tolerance;pain;strength  -AS           User Key  (r) = Recorded By, (t) = Taken By, (c) = Cosigned By    Initials Name Provider Type    AS Peg Antonio OT Occupational Therapist                 Mobility/ADL's     [facility] 04/22/22 0851          Bed Mobility    Bed Mobility supine-sit  -AS     Supine-Sit [place] (Bed Mobility) contact guard  -AS     Row Name 04/22/22 0851          Transfers    Transfers sit-stand transfer;stand-sit transfer  -AS     Sit-Stand [place] (Transfers) contact guard  -AS     Stand-Sit [place] (Transfers) contact guard  -AS     Row Name 04/22/22 0851          Functional Mobility    Functional Mobility- Ind. Level contact guard assist  -AS     Functional Mobility- Device walker, front-wheeled  -AS     Row Name 04/22/22 0851          Activities of Daily Living    BADL Assessment/Intervention lower body dressing  -AS     Row Name 04/22/22 0851          Lower Body Dressing Assessment/Training    [place] Level (Lower Body Dressing) don;socks;dependent (less than 25% patient effort)  -AS     Position (Lower Body Dressing) supine  -AS           User Key  (r) = Recorded By, (t) = Taken By, (c) = Cosigned By    Initials Name Provider Type    AS Peg Antonio OT Occupational Therapist               Obj/Interventions     Row Name 04/22/22 0851          Sensory Assessment (Somatosensory)    Sensory Assessment (Somatosensory) UE sensation intact  -AS     Row Name 04/22/22 0851          Range of Motion Comprehensive    General Range of Motion bilateral upper extremity ROM WFL  -AS     Row Name 04/22/22 0851          Strength Comprehensive (MMT)    Comment, General Manual Muscle Testing (MMT) Assessment BUE grossly 4+/5 throughout  -AS            User Key  (r) = Recorded By, (t) = Taken By, (c) = Cosigned By    Initials Name Provider Type    AS Peg Antonio OT Occupational Therapist               Goals/Plan     Row Name 04/22/22 0851          Transfer Goal 1 (OT)    Activity/Assistive Device (Transfer Goal 1, OT) sit-to-stand/stand-to-sit;bed-to-chair/chair-to-bed;toilet  -AS     [place] Level/Cues Needed (Transfer Goal 1, OT) modified independence  -AS     Time Frame (Transfer Goal 1, OT) long term goal (LTG);by discharge  -AS     Progress/Outcome (Transfer Goal 1, OT) goal not met  -AS     Row Name 04/22/22 0851          Bathing Goal 1 (OT)    Activity/Device (Bathing Goal 1, OT) bathing skills, all;long-handled sponge  -AS     [place] Level/Cues Needed (Bathing Goal 1, OT) minimum assist (75% or more patient effort)  -AS     Time Frame (Bathing Goal 1, OT) long term goal (LTG);by discharge  -AS     Progress/Outcomes (Bathing Goal 1, OT) goal not met  -AS     Row Name 04/22/22 0851          Dressing Goal 1 (OT)    Activity/Device (Dressing Goal 1, OT) lower body dressing  -AS     [place]/Cues Needed (Dressing Goal 1, OT) minimum assist (75% or more patient effort)  -AS     Time Frame (Dressing Goal 1, OT) long term goal (LTG);by discharge  -AS     Strategies/Barriers (Dressing Goal 1, OT) using AE PRN  -AS     Progress/Outcome (Dressing Goal 1, OT) goal not met  -AS     Row Name 04/22/22 0851          Toileting Goal 1 (OT)    Activity/Device (Toileting Goal 1, OT) toileting skills, all  -AS     [place] Level/Cues Needed (Toileting Goal 1, OT) modified independence  -AS     Time Frame (Toileting Goal 1, OT) long term goal (LTG);by discharge  -AS     Progress/Outcome (Toileting Goal 1, OT) goal not met  -AS     Row Name 04/22/22 0851          Therapy Assessment/Plan (OT)    Planned Therapy Interventions (OT) activity tolerance training;strengthening exercise;transfer/mobility retraining;ROM/therapeutic exercise;functional balance  retraining;adaptive equipment training;BADL retraining;occupation/activity based interventions;patient/caregiver education/training  -AS           User Key  (r) = Recorded By, (t) = Taken By, (c) = Cosigned By    Initials Name Provider Type    AS Peg Antonio, OT Occupational Therapist               Clinical Impression     Row Name 04/22/22 0851          Pain Assessment    Pretreatment Pain Rating 9/10  -AS     Posttreatment Pain Rating 9/10  -AS     Pain Location - Side/Orientation Bilateral  -AS     Pain Location lower  -AS     Pain Location - extremity  -AS     Pain Intervention(s) Repositioned;Ambulation/increased activity  -AS     Row Name 04/22/22 0851          Plan of Care Review    Plan of Care Reviewed With patient  -AS     Row Name 04/22/22 0851          Therapy Assessment/Plan (OT)    Rehab Potential (OT) good, to achieve stated therapy goals  -AS     Criteria for Skilled Therapeutic Interventions Met (OT) yes;meets criteria;skilled treatment is necessary  -AS     Therapy Frequency (OT) other (see comments)  3-7days/wk  -AS     Predicted Duration of Therapy Intervention (OT) until goals met or d/c from facility  -AS     Row Name 04/22/22 0851          Therapy Plan Review/Discharge Plan (OT)    Anticipated Discharge Disposition (OT) home with assist  -AS     Row Name 04/22/22 0851          Vital Signs    Pre Systolic BP Rehab 113  -AS     Pre Treatment Diastolic BP 59  -AS     Pretreatment Heart Rate (beats/min) 88  -AS     Pre SpO2 (%) 98  -AS     O2 Delivery Pre Treatment room air  -AS     Pre Patient Position Supine  -AS     Row Name 04/22/22 0851          Positioning and Restraints    Pre-Treatment Position in bed  -AS     Post Treatment Position chair  -AS     In Chair notified nsg;reclined;call light within reach;encouraged to call for assist;legs elevated  -AS           User Key  (r) = Recorded By, (t) = Taken By, (c) = Cosigned By    Initials Name Provider Type    AS Peg Antonio, OT  Occupational Therapist               Outcome Measures     Row Name 04/22/22 0851          How much help from another is currently needed...    Putting on and taking off regular lower body clothing? 2  -AS     Bathing (including washing, rinsing, and drying) 2  -AS     Toileting (which includes using toilet bed pan or urinal) 3  -AS     Putting on and taking off regular upper body clothing 3  -AS     Taking care of personal grooming (such as brushing teeth) 4  -AS     Eating meals 4  -AS     AM-PAC 6 Clicks Score (OT) 18  -AS     Row Name 04/22/22 0851          Functional Assessment    Outcome Measure Options AM-PAC 6 Clicks Daily Activity (OT)  -AS           User Key  (r) = Recorded By, (t) = Taken By, (c) = Cosigned By    Initials Name Provider Type    AS Peg Antonio OT Occupational Therapist                Occupational Therapy Education                 Title: PT OT SLP Therapies (In Progress)     Topic: Occupational Therapy (In Progress)     Point: ADL training (Done)     Description:   Instruct learner(s) on proper safety adaptation and remediation techniques during self care or transfers.   Instruct in proper use of assistive devices.              Learning Progress Summary           Patient Acceptance, E, VU by AS at 4/22/2022 1046    Comment: role of OT, OT POC, ADL training, transfer training                   Point: Home exercise program (Not Started)     Description:   Instruct learner(s) on appropriate technique for monitoring, assisting and/or progressing therapeutic exercises/activities.              Learner Progress:  Not documented in this visit.          Point: Precautions (Done)     Description:   Instruct learner(s) on prescribed precautions during self-care and functional transfers.              Learning Progress Summary           Patient Acceptance, E, VU by AS at 4/22/2022 1046    Comment: role of OT, OT POC, ADL training, transfer training                   Point: Body mechanics (Not  Started)     Description:   Instruct learner(s) on proper positioning and spine alignment during self-care, functional mobility activities and/or exercises.              Learner Progress:  Not documented in this visit.                      User Key     Initials Effective Dates Name Provider Type Discipline    AS 03/18/21 -  Peg Antonio OT Occupational Therapist OT              OT Recommendation and Plan  Planned Therapy Interventions (OT): activity tolerance training, strengthening exercise, transfer/mobility retraining, ROM/therapeutic exercise, functional balance retraining, adaptive equipment training, BADL retraining, occupation/activity based interventions, patient/caregiver education/training  Therapy Frequency (OT): other (see comments) (3-7days/wk)  Plan of Care Review  Plan of Care Reviewed With: patient     Time Calculation:    Time Calculation- OT     Row Name 04/22/22 1053             Time Calculation- OT    OT Start Time 0850  -AS      OT Stop Time 0915  -AS      OT Time Calculation (min) 25 min  -AS      OT Received On 04/22/22  -AS      OT Goal Re-Cert Due Date 05/05/22  -AS              Untimed Charges    OT Eval/Re-eval Minutes 25  -AS              Total Minutes    Untimed Charges Total Minutes 25  -AS       Total Minutes 25  -AS            User Key  (r) = Recorded By, (t) = Taken By, (c) = Cosigned By    Initials Name Provider Type    AS Peg Antonio OT Occupational Therapist              Therapy Charges for Today     Code Description Service Date Service Provider Modifiers Qty    [id number] HC OT EVAL MOD COMPLEXITY 2 4/22/2022 Peg Antonio OT GO 1               Peg Antonio OT  4/22/2022

## 2022-04-22 NOTE — PLAN OF CARE
Goal Outcome Evaluation:  Plan of Care Reviewed With: patient           Outcome Evaluation: PT eval completed. Patient pleasant and cooperative. Does not report any falls in the past 6 months. Bed mobility: CGA for supine>sit transfer Transfers: CGA for sit<>stand with RW Gait: CGA for ambulation 50'x1 with RW. Patient ambulation limited by pain. Patient presents with antalgic pattern and decreased gait speed. Balance: 23/28 on Tinetti balance and gait assessment which indicates moderate fall risk. Patient reports he has never had education on lymphadema management.

## 2022-04-23 PROCEDURE — 25010000002 CEFEPIME PER 500 MG: Performed by: INTERNAL MEDICINE

## 2022-04-23 PROCEDURE — 97162 PT EVAL MOD COMPLEX 30 MIN: CPT

## 2022-04-23 PROCEDURE — 25010000002 VANCOMYCIN 10 G RECONSTITUTED SOLUTION: Performed by: INTERNAL MEDICINE

## 2022-04-24 PROCEDURE — 25010000002 FUROSEMIDE PER 20 MG: Performed by: INTERNAL MEDICINE

## 2022-04-24 PROCEDURE — 25010000002 VANCOMYCIN 10 G RECONSTITUTED SOLUTION: Performed by: INTERNAL MEDICINE

## 2022-04-24 PROCEDURE — 25010000002 CEFEPIME PER 500 MG: Performed by: INTERNAL MEDICINE

## 2022-04-24 PROCEDURE — 97166 OT EVAL MOD COMPLEX 45 MIN: CPT

## 2022-04-24 RX ORDER — IPRATROPIUM BROMIDE AND ALBUTEROL SULFATE 2.5; .5 MG/3ML; MG/3ML
3 SOLUTION RESPIRATORY (INHALATION) EVERY 4 HOURS PRN
Status: DISCONTINUED | OUTPATIENT
Start: 2022-04-24 | End: 2022-05-03 | Stop reason: HOSPADM

## 2022-04-24 RX ORDER — FUROSEMIDE 10 MG/ML
40 INJECTION INTRAMUSCULAR; INTRAVENOUS EVERY 8 HOURS
Status: DISPENSED | OUTPATIENT
Start: 2022-04-24 | End: 2022-04-27

## 2022-04-25 LAB
BACTERIA SPEC AEROBE CULT: NORMAL
BACTERIA SPEC AEROBE CULT: NORMAL

## 2022-04-25 PROCEDURE — 25010000002 FUROSEMIDE PER 20 MG: Performed by: INTERNAL MEDICINE

## 2022-04-25 PROCEDURE — 25010000002 CEFEPIME PER 500 MG: Performed by: INTERNAL MEDICINE

## 2022-04-25 PROCEDURE — 97530 THERAPEUTIC ACTIVITIES: CPT

## 2022-04-25 PROCEDURE — 97535 SELF CARE MNGMENT TRAINING: CPT

## 2022-04-25 PROCEDURE — 25010000002 VANCOMYCIN 10 G RECONSTITUTED SOLUTION: Performed by: INTERNAL MEDICINE

## 2022-04-26 PROCEDURE — 97530 THERAPEUTIC ACTIVITIES: CPT

## 2022-04-26 PROCEDURE — 97110 THERAPEUTIC EXERCISES: CPT

## 2022-04-26 PROCEDURE — 25010000002 FUROSEMIDE PER 20 MG: Performed by: INTERNAL MEDICINE

## 2022-04-26 PROCEDURE — 25010000002 CEFEPIME PER 500 MG: Performed by: INTERNAL MEDICINE

## 2022-04-27 LAB
ANION GAP SERPL CALCULATED.3IONS-SCNC: 10 MMOL/L (ref 5–15)
BUN SERPL-MCNC: 14 MG/DL (ref 6–20)
BUN/CREAT SERPL: 14.9 (ref 7–25)
CALCIUM SPEC-SCNC: 9.4 MG/DL (ref 8.6–10.5)
CHLORIDE SERPL-SCNC: 97 MMOL/L (ref 98–107)
CO2 SERPL-SCNC: 32 MMOL/L (ref 22–29)
CREAT SERPL-MCNC: 0.94 MG/DL (ref 0.76–1.27)
EGFRCR SERPLBLD CKD-EPI 2021: 98.8 ML/MIN/1.73
GLUCOSE SERPL-MCNC: 89 MG/DL (ref 65–99)
POTASSIUM SERPL-SCNC: 3.8 MMOL/L (ref 3.5–5.2)
SODIUM SERPL-SCNC: 139 MMOL/L (ref 136–145)

## 2022-04-27 PROCEDURE — 80048 BASIC METABOLIC PNL TOTAL CA: CPT | Performed by: INTERNAL MEDICINE

## 2022-04-27 PROCEDURE — 97116 GAIT TRAINING THERAPY: CPT

## 2022-04-27 PROCEDURE — 25010000002 CEFEPIME PER 500 MG: Performed by: INTERNAL MEDICINE

## 2022-04-27 PROCEDURE — 25010000002 FUROSEMIDE PER 20 MG: Performed by: INTERNAL MEDICINE

## 2022-04-27 PROCEDURE — 97535 SELF CARE MNGMENT TRAINING: CPT

## 2022-04-27 RX ORDER — HYDROXYZINE HYDROCHLORIDE 25 MG/1
25 TABLET, FILM COATED ORAL EVERY 6 HOURS PRN
Status: DISCONTINUED | OUTPATIENT
Start: 2022-04-27 | End: 2022-05-03 | Stop reason: HOSPADM

## 2022-04-28 PROCEDURE — 97535 SELF CARE MNGMENT TRAINING: CPT

## 2022-04-28 PROCEDURE — 97116 GAIT TRAINING THERAPY: CPT

## 2022-04-29 PROCEDURE — 97530 THERAPEUTIC ACTIVITIES: CPT

## 2022-04-29 PROCEDURE — 97535 SELF CARE MNGMENT TRAINING: CPT

## 2022-05-01 ENCOUNTER — APPOINTMENT (OUTPATIENT)
Dept: GENERAL RADIOLOGY | Facility: HOSPITAL | Age: 52
End: 2022-05-01

## 2022-05-01 PROCEDURE — 93005 ELECTROCARDIOGRAM TRACING: CPT | Performed by: INTERNAL MEDICINE

## 2022-05-01 PROCEDURE — 93010 ELECTROCARDIOGRAM REPORT: CPT | Performed by: INTERNAL MEDICINE

## 2022-05-01 PROCEDURE — 71045 X-RAY EXAM CHEST 1 VIEW: CPT

## 2022-05-01 RX ORDER — DOCUSATE SODIUM 100 MG/1
100 CAPSULE, LIQUID FILLED ORAL DAILY
Status: DISCONTINUED | OUTPATIENT
Start: 2022-05-01 | End: 2022-05-03 | Stop reason: HOSPADM

## 2022-05-02 LAB
ALBUMIN SERPL-MCNC: 3.8 G/DL (ref 3.5–5.2)
ALBUMIN/GLOB SERPL: 1.2 G/DL
ALP SERPL-CCNC: 89 U/L (ref 39–117)
ALT SERPL W P-5'-P-CCNC: 40 U/L (ref 1–41)
ANION GAP SERPL CALCULATED.3IONS-SCNC: 12 MMOL/L (ref 5–15)
AST SERPL-CCNC: 25 U/L (ref 1–40)
BASOPHILS # BLD AUTO: 0.04 10*3/MM3 (ref 0–0.2)
BASOPHILS NFR BLD AUTO: 0.7 % (ref 0–1.5)
BILIRUB SERPL-MCNC: 0.3 MG/DL (ref 0–1.2)
BUN SERPL-MCNC: 12 MG/DL (ref 6–20)
BUN/CREAT SERPL: 15.2 (ref 7–25)
CALCIUM SPEC-SCNC: 9.4 MG/DL (ref 8.6–10.5)
CHLORIDE SERPL-SCNC: 105 MMOL/L (ref 98–107)
CO2 SERPL-SCNC: 24 MMOL/L (ref 22–29)
CREAT SERPL-MCNC: 0.79 MG/DL (ref 0.76–1.27)
DEPRECATED RDW RBC AUTO: 45.5 FL (ref 37–54)
EGFRCR SERPLBLD CKD-EPI 2021: 108.2 ML/MIN/1.73
EOSINOPHIL # BLD AUTO: 0.45 10*3/MM3 (ref 0–0.4)
EOSINOPHIL NFR BLD AUTO: 8 % (ref 0.3–6.2)
ERYTHROCYTE [DISTWIDTH] IN BLOOD BY AUTOMATED COUNT: 15 % (ref 12.3–15.4)
GLOBULIN UR ELPH-MCNC: 3.3 GM/DL
GLUCOSE SERPL-MCNC: 89 MG/DL (ref 65–99)
HCT VFR BLD AUTO: 39.3 % (ref 37.5–51)
HGB BLD-MCNC: 12.8 G/DL (ref 13–17.7)
IMM GRANULOCYTES # BLD AUTO: 0.02 10*3/MM3 (ref 0–0.05)
IMM GRANULOCYTES NFR BLD AUTO: 0.4 % (ref 0–0.5)
LYMPHOCYTES # BLD AUTO: 1.68 10*3/MM3 (ref 0.7–3.1)
LYMPHOCYTES NFR BLD AUTO: 29.9 % (ref 19.6–45.3)
MAGNESIUM SERPL-MCNC: 1.8 MG/DL (ref 1.6–2.6)
MCH RBC QN AUTO: 27 PG (ref 26.6–33)
MCHC RBC AUTO-ENTMCNC: 32.6 G/DL (ref 31.5–35.7)
MCV RBC AUTO: 82.9 FL (ref 79–97)
MONOCYTES # BLD AUTO: 0.49 10*3/MM3 (ref 0.1–0.9)
MONOCYTES NFR BLD AUTO: 8.7 % (ref 5–12)
NEUTROPHILS NFR BLD AUTO: 2.93 10*3/MM3 (ref 1.7–7)
NEUTROPHILS NFR BLD AUTO: 52.3 % (ref 42.7–76)
NRBC BLD AUTO-RTO: 0 /100 WBC (ref 0–0.2)
PLATELET # BLD AUTO: 194 10*3/MM3 (ref 140–450)
PMV BLD AUTO: 10.7 FL (ref 6–12)
POTASSIUM SERPL-SCNC: 4.2 MMOL/L (ref 3.5–5.2)
PROT SERPL-MCNC: 7.1 G/DL (ref 6–8.5)
RBC # BLD AUTO: 4.74 10*6/MM3 (ref 4.14–5.8)
SODIUM SERPL-SCNC: 141 MMOL/L (ref 136–145)
WBC NRBC COR # BLD: 5.61 10*3/MM3 (ref 3.4–10.8)

## 2022-05-02 PROCEDURE — 97110 THERAPEUTIC EXERCISES: CPT

## 2022-05-02 PROCEDURE — 85025 COMPLETE CBC W/AUTO DIFF WBC: CPT | Performed by: INTERNAL MEDICINE

## 2022-05-02 PROCEDURE — 83735 ASSAY OF MAGNESIUM: CPT | Performed by: INTERNAL MEDICINE

## 2022-05-02 PROCEDURE — 80053 COMPREHEN METABOLIC PANEL: CPT | Performed by: INTERNAL MEDICINE

## 2022-05-02 PROCEDURE — 97116 GAIT TRAINING THERAPY: CPT

## 2022-05-02 PROCEDURE — 97530 THERAPEUTIC ACTIVITIES: CPT

## 2022-05-12 ENCOUNTER — OFFICE VISIT (OUTPATIENT)
Dept: WOUND CARE | Facility: HOSPITAL | Age: 52
End: 2022-05-12

## 2022-05-12 PROCEDURE — G0463 HOSPITAL OUTPT CLINIC VISIT: HCPCS

## 2022-05-19 ENCOUNTER — OFFICE VISIT (OUTPATIENT)
Dept: WOUND CARE | Facility: HOSPITAL | Age: 52
End: 2022-05-19

## 2022-05-24 LAB
QT INTERVAL: 368 MS
QTC INTERVAL: 421 MS

## 2022-05-26 ENCOUNTER — OFFICE VISIT (OUTPATIENT)
Dept: WOUND CARE | Facility: HOSPITAL | Age: 52
End: 2022-05-26

## 2022-05-27 ENCOUNTER — TRANSCRIBE ORDERS (OUTPATIENT)
Dept: PODIATRY | Facility: CLINIC | Age: 52
End: 2022-05-27

## 2022-05-27 DIAGNOSIS — B35.1 ONYCHOMYCOSIS: Primary | ICD-10-CM

## 2022-06-02 ENCOUNTER — OFFICE VISIT (OUTPATIENT)
Dept: WOUND CARE | Facility: HOSPITAL | Age: 52
End: 2022-06-02

## 2022-06-02 PROCEDURE — G0463 HOSPITAL OUTPT CLINIC VISIT: HCPCS

## 2022-06-13 ENCOUNTER — OFFICE VISIT (OUTPATIENT)
Dept: PODIATRY | Facility: CLINIC | Age: 52
End: 2022-06-13

## 2022-06-13 VITALS — HEIGHT: 75 IN | BODY MASS INDEX: 39.17 KG/M2 | OXYGEN SATURATION: 97 % | HEART RATE: 74 BPM | WEIGHT: 315 LBS

## 2022-06-13 DIAGNOSIS — L84 CORNS AND CALLOSITIES: ICD-10-CM

## 2022-06-13 DIAGNOSIS — M79.675 CHRONIC TOE PAIN, BILATERAL: ICD-10-CM

## 2022-06-13 DIAGNOSIS — G89.29 CHRONIC TOE PAIN, BILATERAL: ICD-10-CM

## 2022-06-13 DIAGNOSIS — B35.1 ONYCHOMYCOSIS: Primary | ICD-10-CM

## 2022-06-13 DIAGNOSIS — M79.674 CHRONIC TOE PAIN, BILATERAL: ICD-10-CM

## 2022-06-13 PROCEDURE — 11056 PARNG/CUTG B9 HYPRKR LES 2-4: CPT | Performed by: PODIATRIST

## 2022-06-13 PROCEDURE — 11721 DEBRIDE NAIL 6 OR MORE: CPT | Performed by: PODIATRIST

## 2022-06-13 NOTE — PROGRESS NOTES
Jalil Camarillo  1970  51 y.o. male  PCP: Ethan Casey 8/12/21 06/13/2022      Chief Complaint   Patient presents with   • Left Foot - Follow-up     Diabetic foot    • Right Foot - Follow-up     Diabetic foot       History of Present Illness    Jalil Camarillo is a 51 y.o.male who presents to clinic today requesting foot care.  States that he is unable to trim his toenails or the calluses on his feet due to the swelling in his legs and his truncal obesity.      Past Medical History:   Diagnosis Date   • Chronic cellulitis    • Chronic venous stasis    • COPD (chronic obstructive pulmonary disease) (HCC)    • GERD (gastroesophageal reflux disease)    • Hyperlipidemia    • Hypertension    • Hypothyroidism    • Lymphedema    • Morbid obesity (HCC)    • Osteoarthritis          Past Surgical History:   Procedure Laterality Date   • HERNIA REPAIR Right          Family History   Adopted: Yes   Problem Relation Age of Onset   • Cancer Mother    • Diabetes Maternal Grandmother        No Known Allergies    Social History     Socioeconomic History   • Marital status: Single   Tobacco Use   • Smoking status: Former Smoker     Types: Cigarettes   • Smokeless tobacco: Never Used   Substance and Sexual Activity   • Alcohol use: No   • Drug use: No   • Sexual activity: Not Currently         Current Outpatient Medications   Medication Sig Dispense Refill   • docusate sodium (COLACE) 100 MG capsule Take 1 capsule by mouth Daily. 30 capsule 0   • Emollient (Bag Balm) ointment ointment Apply topically to both lower extremities twice daily as directed. 240 g 0   • HYDROcodone-acetaminophen (NORCO) 5-325 MG per tablet Take 1 tablet by mouth Every 6 (Six) Hours As Needed for moderate pain. 20 tablet 0   • hydrOXYzine (ATARAX) 25 MG tablet Take 1 tablet by mouth Every 6 (Six) Hours As Needed for itching. 60 tablet 0   • ipratropium-albuterol (DUO-NEB) 0.5-2.5 mg/3 ml nebulizer Inhale the contents of 1 vial by nebulization  "every 4 (four) hours as needed for shortness of air and wheezing. 540 mL 0   • losartan (COZAAR) 25 MG tablet Take 25 mg by mouth Daily.     • losartan (COZAAR) 25 MG tablet Take 1 tablet by mouth Daily. 30 tablet 0   • Omeprazole (PRILOSEC PO) Take 20 mg by mouth Daily As Needed.     • ondansetron (ZOFRAN) 4 MG tablet Take 1 tablet by mouth Every 6 (Six) Hours As Needed for nausea. 30 tablet 0   • pantoprazole (PROTONIX) 40 MG EC tablet Take 1 tablet by mouth Daily. 30 tablet 0   • rosuvastatin (CRESTOR) 20 MG tablet Take 20 mg by mouth Daily.     • rosuvastatin (CRESTOR) 20 MG tablet Take 1 tablet by mouth Daily. 30 tablet 0     No current facility-administered medications for this visit.       Review of Systems   Constitutional: Negative.    HENT: Negative.    Eyes: Negative.    Respiratory: Negative.    Cardiovascular: Positive for leg swelling.   Gastrointestinal: Negative.    Endocrine: Negative.    Genitourinary: Negative.    Musculoskeletal: Negative.         Joint pain   Foot pain     Allergic/Immunologic: Negative.    Neurological: Negative.    Hematological: Negative.    Psychiatric/Behavioral: Negative.          OBJECTIVE    Pulse 74   Ht 190.5 cm (75\")   Wt (!) 155 kg (342 lb)   SpO2 97%   BMI 42.75 kg/m²     Physical Exam  Vitals reviewed.   Constitutional:       General: He is not in acute distress.     Appearance: He is well-developed. He is obese.   HENT:      Head: Normocephalic and atraumatic.      Nose: Nose normal.   Eyes:      Conjunctiva/sclera: Conjunctivae normal.      Pupils: Pupils are equal, round, and reactive to light.   Pulmonary:      Effort: Pulmonary effort is normal. No respiratory distress.      Breath sounds: No wheezing.   Musculoskeletal:         General: Swelling present. No deformity. Normal range of motion.   Skin:     General: Skin is warm and dry.      Capillary Refill: Capillary refill takes less than 2 seconds.   Neurological:      Mental Status: He is alert and " oriented to person, place, and time.   Psychiatric:         Behavior: Behavior normal.         Thought Content: Thought content normal.          Lower Extremity Exam:     Cardiovascular:    DP/PT pulses nonpalpable  2/2 edema b/l    CFT brisk  to all digits b/l  Pitting edema noted to bilateral lower extremities  Musculoskeletal:  Muscle strength is 5/5 for all muscle groups tested b/l  Dermatological:   Webspaces 1-4 b/l are clean, dry and intact.   No subcutaneous nodules or masses noted  b/l  Nails 1-5 b/l are slightly thickened, discolored, elongated with subungual debris.  Large hyperkeratotic lesions noted to the medial heels bilateral.  Neurological:   Protective sensation intact b/l  Sensation intact to light touch b/l         Procedures        ASSESSMENT AND PLAN    Diagnoses and all orders for this visit:    1. Onychomycosis (Primary)    2. Chronic toe pain, bilateral    3. Corns and callosities      -ABN signed by the patient.  - Nails 1-5 bilateral were debrided in length and thickness with nail nipper and electric  to decrease fungal load and risk of infection.  - Trimmed hyperkeratotic lesions noted in objective with a #15 blade without incident.   - All questions were answered to the patients satisfaction.  - RTC 3 months as needed             This document has been electronically signed by Jerardo Morrison DPM on June 18, 2022 11:33 CDT     6/18/2022  11:33 CDT

## 2022-06-20 ENCOUNTER — OFFICE VISIT (OUTPATIENT)
Dept: WOUND CARE | Facility: HOSPITAL | Age: 52
End: 2022-06-20

## 2022-06-20 PROCEDURE — G0463 HOSPITAL OUTPT CLINIC VISIT: HCPCS

## 2022-07-07 ENCOUNTER — OFFICE VISIT (OUTPATIENT)
Dept: WOUND CARE | Facility: HOSPITAL | Age: 52
End: 2022-07-07

## 2022-07-07 ENCOUNTER — TRANSCRIBE ORDERS (OUTPATIENT)
Dept: WOUND CARE | Facility: HOSPITAL | Age: 52
End: 2022-07-07

## 2022-07-07 DIAGNOSIS — I83.002: Primary | ICD-10-CM

## 2022-07-07 DIAGNOSIS — L97.209: Primary | ICD-10-CM

## 2022-07-07 PROCEDURE — G0463 HOSPITAL OUTPT CLINIC VISIT: HCPCS

## 2022-07-12 ENCOUNTER — TRANSCRIBE ORDERS (OUTPATIENT)
Dept: ADMINISTRATIVE | Facility: HOSPITAL | Age: 52
End: 2022-07-12

## 2022-07-12 DIAGNOSIS — I83.002 VENOUS STASIS ULCER OF CALF, UNSPECIFIED LATERALITY, UNSPECIFIED ULCER STAGE, UNSPECIFIED WHETHER VARICOSE VEINS PRESENT: Primary | ICD-10-CM

## 2022-07-12 DIAGNOSIS — L97.209 VENOUS STASIS ULCER OF CALF, UNSPECIFIED LATERALITY, UNSPECIFIED ULCER STAGE, UNSPECIFIED WHETHER VARICOSE VEINS PRESENT: Primary | ICD-10-CM

## 2022-07-28 ENCOUNTER — OFFICE VISIT (OUTPATIENT)
Dept: WOUND CARE | Facility: HOSPITAL | Age: 52
End: 2022-07-28

## 2022-08-04 ENCOUNTER — OFFICE VISIT (OUTPATIENT)
Dept: WOUND CARE | Facility: HOSPITAL | Age: 52
End: 2022-08-04

## 2022-08-11 ENCOUNTER — OFFICE VISIT (OUTPATIENT)
Dept: WOUND CARE | Facility: HOSPITAL | Age: 52
End: 2022-08-11

## 2022-08-18 ENCOUNTER — OFFICE VISIT (OUTPATIENT)
Dept: WOUND CARE | Facility: HOSPITAL | Age: 52
End: 2022-08-18

## 2022-08-31 ENCOUNTER — OFFICE VISIT (OUTPATIENT)
Dept: WOUND CARE | Facility: HOSPITAL | Age: 52
End: 2022-08-31

## 2023-04-10 ENCOUNTER — TRANSCRIBE ORDERS (OUTPATIENT)
Dept: WOUND CARE | Facility: HOSPITAL | Age: 53
End: 2023-04-10
Payer: COMMERCIAL

## 2023-04-10 DIAGNOSIS — S81.809A MULTIPLE OPENS WOUND OF LOWER EXTREMITY, UNSPECIFIED LATERALITY, INITIAL ENCOUNTER: Primary | ICD-10-CM
